# Patient Record
Sex: MALE | Race: BLACK OR AFRICAN AMERICAN | Employment: PART TIME
[De-identification: names, ages, dates, MRNs, and addresses within clinical notes are randomized per-mention and may not be internally consistent; named-entity substitution may affect disease eponyms.]

---

## 2017-01-18 ENCOUNTER — OFFICE VISIT (OUTPATIENT)
Dept: PODIATRY | Facility: CLINIC | Age: 57
End: 2017-01-18

## 2017-01-18 VITALS
SYSTOLIC BLOOD PRESSURE: 150 MMHG | WEIGHT: 198 LBS | HEART RATE: 82 BPM | BODY MASS INDEX: 30.01 KG/M2 | TEMPERATURE: 98.1 F | HEIGHT: 68 IN | DIASTOLIC BLOOD PRESSURE: 88 MMHG

## 2017-01-18 DIAGNOSIS — M79.674 PAIN IN TOES OF BOTH FEET: ICD-10-CM

## 2017-01-18 DIAGNOSIS — E11.9 TYPE 2 DIABETES MELLITUS WITHOUT COMPLICATION, WITHOUT LONG-TERM CURRENT USE OF INSULIN (HCC): ICD-10-CM

## 2017-01-18 DIAGNOSIS — M79.675 PAIN IN TOES OF BOTH FEET: ICD-10-CM

## 2017-01-18 DIAGNOSIS — B35.1 ONYCHOMYCOSIS: Primary | ICD-10-CM

## 2017-01-18 PROCEDURE — 11721 DEBRIDE NAIL 6 OR MORE: CPT | Performed by: PODIATRIST

## 2017-01-18 PROCEDURE — 99213 OFFICE O/P EST LOW 20 MIN: CPT | Performed by: PODIATRIST

## 2017-03-28 DIAGNOSIS — I10 ESSENTIAL HYPERTENSION: ICD-10-CM

## 2017-03-28 RX ORDER — METOPROLOL TARTRATE 100 MG/1
100 TABLET ORAL 2 TIMES DAILY
Qty: 60 TABLET | Refills: 3 | Status: SHIPPED | OUTPATIENT
Start: 2017-03-28 | End: 2018-02-23 | Stop reason: SDUPTHER

## 2017-03-28 RX ORDER — GLIPIZIDE 10 MG/1
TABLET, FILM COATED, EXTENDED RELEASE ORAL
Qty: 60 TABLET | Refills: 4 | Status: SHIPPED | OUTPATIENT
Start: 2017-03-28 | End: 2017-08-24 | Stop reason: SDUPTHER

## 2017-03-28 RX ORDER — SIMVASTATIN 20 MG
20 TABLET ORAL NIGHTLY
Qty: 30 TABLET | Refills: 5 | Status: SHIPPED | OUTPATIENT
Start: 2017-03-28 | End: 2017-09-21 | Stop reason: SDUPTHER

## 2017-03-28 RX ORDER — ASPIRIN 81 MG/1
81 TABLET ORAL DAILY
Qty: 30 TABLET | Refills: 3 | Status: SHIPPED | OUTPATIENT
Start: 2017-03-28 | End: 2021-08-26 | Stop reason: SDUPTHER

## 2017-03-28 RX ORDER — LISINOPRIL 40 MG/1
TABLET ORAL
Qty: 30 TABLET | Refills: 3 | Status: SHIPPED | OUTPATIENT
Start: 2017-03-28 | End: 2017-07-24 | Stop reason: SDUPTHER

## 2017-04-11 ENCOUNTER — TELEPHONE (OUTPATIENT)
Dept: PODIATRY | Age: 57
End: 2017-04-11

## 2017-04-12 RX ORDER — AMLODIPINE BESYLATE 10 MG/1
TABLET ORAL
Qty: 30 TABLET | Refills: 5 | Status: SHIPPED | OUTPATIENT
Start: 2017-04-12 | End: 2017-05-16 | Stop reason: SDUPTHER

## 2017-05-16 ENCOUNTER — OFFICE VISIT (OUTPATIENT)
Dept: FAMILY MEDICINE CLINIC | Age: 57
End: 2017-05-16
Payer: COMMERCIAL

## 2017-05-16 VITALS
TEMPERATURE: 98.3 F | DIASTOLIC BLOOD PRESSURE: 90 MMHG | HEIGHT: 68 IN | BODY MASS INDEX: 30.25 KG/M2 | WEIGHT: 199.6 LBS | SYSTOLIC BLOOD PRESSURE: 140 MMHG | HEART RATE: 69 BPM

## 2017-05-16 DIAGNOSIS — I10 ESSENTIAL HYPERTENSION: ICD-10-CM

## 2017-05-16 DIAGNOSIS — M54.41 CHRONIC RIGHT-SIDED LOW BACK PAIN WITH RIGHT-SIDED SCIATICA: Primary | ICD-10-CM

## 2017-05-16 DIAGNOSIS — G89.29 CHRONIC RIGHT-SIDED LOW BACK PAIN WITH RIGHT-SIDED SCIATICA: Primary | ICD-10-CM

## 2017-05-16 PROCEDURE — 99213 OFFICE O/P EST LOW 20 MIN: CPT | Performed by: FAMILY MEDICINE

## 2017-05-16 RX ORDER — AMLODIPINE BESYLATE 10 MG/1
TABLET ORAL
Qty: 30 TABLET | Refills: 5 | Status: SHIPPED | OUTPATIENT
Start: 2017-05-16 | End: 2018-04-23 | Stop reason: SDUPTHER

## 2017-05-16 RX ORDER — ETODOLAC 400 MG/1
400 TABLET, FILM COATED ORAL 2 TIMES DAILY
Qty: 60 TABLET | Refills: 3 | Status: SHIPPED | OUTPATIENT
Start: 2017-05-16 | End: 2017-06-16 | Stop reason: ALTCHOICE

## 2017-05-16 ASSESSMENT — ENCOUNTER SYMPTOMS
WHEEZING: 0
BACK PAIN: 1
SHORTNESS OF BREATH: 0
GASTROINTESTINAL NEGATIVE: 1

## 2017-05-24 ENCOUNTER — OFFICE VISIT (OUTPATIENT)
Dept: PODIATRY | Age: 57
End: 2017-05-24
Payer: COMMERCIAL

## 2017-05-24 VITALS
WEIGHT: 200 LBS | TEMPERATURE: 97.7 F | BODY MASS INDEX: 30.31 KG/M2 | DIASTOLIC BLOOD PRESSURE: 76 MMHG | HEART RATE: 72 BPM | HEIGHT: 68 IN | SYSTOLIC BLOOD PRESSURE: 132 MMHG

## 2017-05-24 DIAGNOSIS — M20.42 HAMMER TOES OF BOTH FEET: ICD-10-CM

## 2017-05-24 DIAGNOSIS — B35.1 ONYCHOMYCOSIS: Primary | ICD-10-CM

## 2017-05-24 DIAGNOSIS — M20.41 HAMMER TOES OF BOTH FEET: ICD-10-CM

## 2017-05-24 DIAGNOSIS — M79.674 PAIN IN TOES OF BOTH FEET: ICD-10-CM

## 2017-05-24 DIAGNOSIS — E11.9 TYPE 2 DIABETES MELLITUS WITHOUT COMPLICATION, WITHOUT LONG-TERM CURRENT USE OF INSULIN (HCC): ICD-10-CM

## 2017-05-24 DIAGNOSIS — M20.5X9 HALLUX LIMITUS, UNSPECIFIED LATERALITY: ICD-10-CM

## 2017-05-24 DIAGNOSIS — B35.3 TINEA PEDIS OF BOTH FEET: ICD-10-CM

## 2017-05-24 DIAGNOSIS — M79.675 PAIN IN TOES OF BOTH FEET: ICD-10-CM

## 2017-05-24 PROCEDURE — 11721 DEBRIDE NAIL 6 OR MORE: CPT | Performed by: PODIATRIST

## 2017-05-24 PROCEDURE — 99213 OFFICE O/P EST LOW 20 MIN: CPT | Performed by: PODIATRIST

## 2017-05-24 RX ORDER — KETOCONAZOLE 20 MG/G
CREAM TOPICAL
Qty: 30 G | Refills: 1 | Status: SHIPPED | OUTPATIENT
Start: 2017-05-24

## 2017-06-02 ENCOUNTER — HOSPITAL ENCOUNTER (OUTPATIENT)
Dept: PHYSICAL THERAPY | Age: 57
Setting detail: THERAPIES SERIES
Discharge: HOME | End: 2017-06-02
Payer: COMMERCIAL

## 2017-06-16 ENCOUNTER — OFFICE VISIT (OUTPATIENT)
Dept: FAMILY MEDICINE CLINIC | Age: 57
End: 2017-06-16
Payer: COMMERCIAL

## 2017-06-16 VITALS
TEMPERATURE: 97.6 F | HEIGHT: 68 IN | WEIGHT: 197 LBS | HEART RATE: 68 BPM | SYSTOLIC BLOOD PRESSURE: 138 MMHG | DIASTOLIC BLOOD PRESSURE: 89 MMHG | BODY MASS INDEX: 29.86 KG/M2

## 2017-06-16 DIAGNOSIS — M54.41 CHRONIC RIGHT-SIDED LOW BACK PAIN WITH RIGHT-SIDED SCIATICA: ICD-10-CM

## 2017-06-16 DIAGNOSIS — E78.5 HYPERLIPIDEMIA, UNSPECIFIED HYPERLIPIDEMIA TYPE: ICD-10-CM

## 2017-06-16 DIAGNOSIS — E11.9 TYPE 2 DIABETES MELLITUS WITHOUT COMPLICATION, WITHOUT LONG-TERM CURRENT USE OF INSULIN (HCC): Primary | ICD-10-CM

## 2017-06-16 DIAGNOSIS — I10 ESSENTIAL HYPERTENSION: ICD-10-CM

## 2017-06-16 DIAGNOSIS — G89.29 CHRONIC RIGHT-SIDED LOW BACK PAIN WITH RIGHT-SIDED SCIATICA: ICD-10-CM

## 2017-06-16 DIAGNOSIS — Z23 NEED FOR VACCINATION: ICD-10-CM

## 2017-06-16 DIAGNOSIS — R60.9 PERIPHERAL EDEMA: ICD-10-CM

## 2017-06-16 PROCEDURE — 99213 OFFICE O/P EST LOW 20 MIN: CPT | Performed by: FAMILY MEDICINE

## 2017-06-16 PROCEDURE — 90471 IMMUNIZATION ADMIN: CPT | Performed by: FAMILY MEDICINE

## 2017-06-16 PROCEDURE — 90732 PPSV23 VACC 2 YRS+ SUBQ/IM: CPT | Performed by: FAMILY MEDICINE

## 2017-06-16 PROCEDURE — 90715 TDAP VACCINE 7 YRS/> IM: CPT | Performed by: FAMILY MEDICINE

## 2017-06-16 PROCEDURE — 90472 IMMUNIZATION ADMIN EACH ADD: CPT | Performed by: FAMILY MEDICINE

## 2017-06-16 RX ORDER — NAPROXEN 500 MG/1
500 TABLET ORAL 2 TIMES DAILY PRN
Qty: 60 TABLET | Refills: 0 | Status: SHIPPED | OUTPATIENT
Start: 2017-06-16 | End: 2017-12-04 | Stop reason: ALTCHOICE

## 2017-06-16 ASSESSMENT — ENCOUNTER SYMPTOMS
RESPIRATORY NEGATIVE: 1
GASTROINTESTINAL NEGATIVE: 1
BACK PAIN: 1

## 2017-06-16 ASSESSMENT — PATIENT HEALTH QUESTIONNAIRE - PHQ9
1. LITTLE INTEREST OR PLEASURE IN DOING THINGS: 0
SUM OF ALL RESPONSES TO PHQ QUESTIONS 1-9: 0
2. FEELING DOWN, DEPRESSED OR HOPELESS: 0
SUM OF ALL RESPONSES TO PHQ9 QUESTIONS 1 & 2: 0

## 2017-07-25 RX ORDER — LISINOPRIL 40 MG/1
TABLET ORAL
Qty: 30 TABLET | Refills: 3 | Status: SHIPPED | OUTPATIENT
Start: 2017-07-25 | End: 2017-12-08 | Stop reason: SDUPTHER

## 2017-08-24 RX ORDER — GLIPIZIDE 10 MG/1
TABLET, FILM COATED, EXTENDED RELEASE ORAL
Qty: 60 TABLET | Refills: 4 | Status: SHIPPED | OUTPATIENT
Start: 2017-08-24 | End: 2018-01-26 | Stop reason: SDUPTHER

## 2017-09-18 ENCOUNTER — OFFICE VISIT (OUTPATIENT)
Dept: FAMILY MEDICINE CLINIC | Age: 57
End: 2017-09-18
Payer: COMMERCIAL

## 2017-09-18 VITALS
DIASTOLIC BLOOD PRESSURE: 85 MMHG | WEIGHT: 199.8 LBS | BODY MASS INDEX: 30.28 KG/M2 | HEART RATE: 65 BPM | SYSTOLIC BLOOD PRESSURE: 140 MMHG | HEIGHT: 68 IN | TEMPERATURE: 97.2 F

## 2017-09-18 DIAGNOSIS — M25.522 LEFT ELBOW PAIN: ICD-10-CM

## 2017-09-18 DIAGNOSIS — E11.9 TYPE 2 DIABETES MELLITUS WITHOUT COMPLICATION, WITHOUT LONG-TERM CURRENT USE OF INSULIN (HCC): Primary | ICD-10-CM

## 2017-09-18 LAB
CREATININE URINE POCT: 300
HBA1C MFR BLD: 9.4 %
MICROALBUMIN/CREAT 24H UR: 150 MG/G{CREAT}
MICROALBUMIN/CREAT UR-RTO: NORMAL

## 2017-09-18 PROCEDURE — 83036 HEMOGLOBIN GLYCOSYLATED A1C: CPT | Performed by: FAMILY MEDICINE

## 2017-09-18 PROCEDURE — 99213 OFFICE O/P EST LOW 20 MIN: CPT | Performed by: FAMILY MEDICINE

## 2017-09-18 PROCEDURE — 82044 UR ALBUMIN SEMIQUANTITATIVE: CPT | Performed by: FAMILY MEDICINE

## 2017-09-18 ASSESSMENT — ENCOUNTER SYMPTOMS
SHORTNESS OF BREATH: 0
NAUSEA: 0
VOMITING: 0
DIARRHEA: 0

## 2017-09-21 RX ORDER — SIMVASTATIN 20 MG
TABLET ORAL
Qty: 30 TABLET | Refills: 5 | Status: SHIPPED | OUTPATIENT
Start: 2017-09-21 | End: 2018-03-25 | Stop reason: SDUPTHER

## 2017-09-28 LAB
AVERAGE GLUCOSE: NORMAL
HBA1C MFR BLD: 8 %

## 2017-10-02 ENCOUNTER — TELEPHONE (OUTPATIENT)
Dept: FAMILY MEDICINE CLINIC | Age: 57
End: 2017-10-02

## 2017-10-02 DIAGNOSIS — E11.65 TYPE 2 DIABETES MELLITUS WITH HYPERGLYCEMIA, WITHOUT LONG-TERM CURRENT USE OF INSULIN (HCC): Primary | ICD-10-CM

## 2017-10-02 RX ORDER — LANCETS 26 GAUGE
1 EACH MISCELLANEOUS PRN
Qty: 100 EACH | Refills: 1 | Status: SHIPPED | OUTPATIENT
Start: 2017-10-02

## 2017-10-02 RX ORDER — PEN NEEDLE, DIABETIC 31 GX5/16"
1 NEEDLE, DISPOSABLE MISCELLANEOUS PRN
Qty: 100 EACH | Refills: 1 | Status: SHIPPED | OUTPATIENT
Start: 2017-10-02

## 2017-10-03 ENCOUNTER — TELEPHONE (OUTPATIENT)
Dept: FAMILY MEDICINE CLINIC | Age: 57
End: 2017-10-03

## 2017-10-09 ENCOUNTER — OFFICE VISIT (OUTPATIENT)
Dept: FAMILY MEDICINE CLINIC | Age: 57
End: 2017-10-09
Payer: COMMERCIAL

## 2017-10-09 VITALS
HEART RATE: 70 BPM | SYSTOLIC BLOOD PRESSURE: 130 MMHG | BODY MASS INDEX: 29.19 KG/M2 | TEMPERATURE: 97.5 F | WEIGHT: 192.6 LBS | HEIGHT: 68 IN | DIASTOLIC BLOOD PRESSURE: 80 MMHG

## 2017-10-09 DIAGNOSIS — G45.8 OTHER SPECIFIED TRANSIENT CEREBRAL ISCHEMIAS: ICD-10-CM

## 2017-10-09 DIAGNOSIS — I10 ESSENTIAL HYPERTENSION: Primary | ICD-10-CM

## 2017-10-09 PROCEDURE — 99213 OFFICE O/P EST LOW 20 MIN: CPT | Performed by: FAMILY MEDICINE

## 2017-10-09 RX ORDER — CYCLOBENZAPRINE HCL 10 MG
10 TABLET ORAL
COMMUNITY
Start: 2017-09-25 | End: 2017-10-25

## 2017-10-09 RX ORDER — INSULIN LISPRO 100 [IU]/ML
INJECTION, SOLUTION INTRAVENOUS; SUBCUTANEOUS
Refills: 1 | COMMUNITY
Start: 2017-09-30 | End: 2018-02-19

## 2017-10-09 RX ORDER — INSULIN GLARGINE 100 [IU]/ML
16 INJECTION, SOLUTION SUBCUTANEOUS
COMMUNITY
Start: 2017-09-30 | End: 2017-11-09 | Stop reason: ALTCHOICE

## 2017-10-09 RX ORDER — CLOPIDOGREL BISULFATE 75 MG/1
1 TABLET ORAL DAILY
Refills: 0 | COMMUNITY
Start: 2017-09-30 | End: 2017-10-30 | Stop reason: SDUPTHER

## 2017-10-09 RX ORDER — IBUPROFEN 600 MG/1
1 TABLET ORAL EVERY 6 HOURS PRN
Refills: 0 | COMMUNITY
Start: 2017-09-25 | End: 2017-12-04

## 2017-10-09 RX ORDER — METOPROLOL TARTRATE 100 MG/1
1 TABLET ORAL DAILY
Refills: 0 | COMMUNITY
Start: 2017-09-23 | End: 2018-02-19

## 2017-10-09 ASSESSMENT — ENCOUNTER SYMPTOMS
SORE THROAT: 0
ABDOMINAL PAIN: 0
NAUSEA: 0
CHEST TIGHTNESS: 0
VOMITING: 0
SHORTNESS OF BREATH: 0
BLOOD IN STOOL: 0
COUGH: 0

## 2017-10-09 NOTE — PROGRESS NOTES
Visit Information    Have you changed or started any medications since your last visit including any over-the-counter medicines, vitamins, or herbal medicines? no   Have you stopped taking any of your medications? Is so, why? -  no  Are you having any side effects from any of your medications? - no    Have you seen any other physician or provider since your last visit?  no   Have you had any other diagnostic tests since your last visit? yes - Labs   Have you been seen in the emergency room and/or had an admission in a hospital since we last saw you?  yes - [place]   Have you had your routine dental cleaning in the past 6 months?  no     Do you have an active MyChart account? If no, what is the barrier?   No: Declined    Patient Care Team:  Sruthi Pope MD as PCP - General (Family Medicine)    Medical History Review  Past Medical, Family, and Social History reviewed and does not contribute to the patient presenting condition    Health Maintenance   Topic Date Due    Diabetic retinal exam  03/30/2016    Lipid screen  02/11/2017    Flu vaccine (1) 10/11/2017 (Originally 9/1/2017)    Diabetic hemoglobin A1C test  12/18/2017    Diabetic foot exam  09/18/2018    Diabetic microalbuminuria test  09/18/2018    Colon cancer screen colonoscopy  08/25/2024    DTaP/Tdap/Td vaccine (2 - Td) 06/16/2027    Pneumococcal med risk  Completed    Hepatitis C screen  Addressed    HIV screen  Addressed
He is alert and oriented to person, place, and time. He has normal reflexes. Skin: Skin is warm and dry. Psychiatric: He has a normal mood and affect. His behavior is normal. Judgment and thought content normal.       Assessment:      1. Essential hypertension     2. Other specified transient cerebral ischemias             Plan: Will pursue chart review - prior medical records review.     RTW - TODAY    F/U IN 2-3 WEEKS      Goals - monitor risks, weight reduction, BP control, DM care

## 2017-10-11 ENCOUNTER — TELEPHONE (OUTPATIENT)
Dept: FAMILY MEDICINE CLINIC | Age: 57
End: 2017-10-11

## 2017-10-11 NOTE — TELEPHONE ENCOUNTER
Patient is calling regarding work letter given at last appointment. Patient is asking if he can be given a new letter stating he was unable to work for the time between his hospitalization at [facility] and his appointment on 10/9/17. If I can be of any assistance please route to the applicable pool.    Thanks, Carol Pride

## 2017-10-31 RX ORDER — CLOPIDOGREL BISULFATE 75 MG/1
TABLET ORAL
Qty: 30 TABLET | Refills: 0 | Status: SHIPPED | OUTPATIENT
Start: 2017-10-31 | End: 2017-12-04 | Stop reason: SDUPTHER

## 2017-11-09 ENCOUNTER — OFFICE VISIT (OUTPATIENT)
Dept: FAMILY MEDICINE CLINIC | Age: 57
End: 2017-11-09
Payer: COMMERCIAL

## 2017-11-09 VITALS
SYSTOLIC BLOOD PRESSURE: 138 MMHG | HEIGHT: 68 IN | TEMPERATURE: 98.8 F | WEIGHT: 199.4 LBS | BODY MASS INDEX: 30.22 KG/M2 | HEART RATE: 67 BPM | DIASTOLIC BLOOD PRESSURE: 80 MMHG

## 2017-11-09 DIAGNOSIS — E11.65 TYPE 2 DIABETES MELLITUS WITH HYPERGLYCEMIA, WITHOUT LONG-TERM CURRENT USE OF INSULIN (HCC): Primary | ICD-10-CM

## 2017-11-09 PROCEDURE — G8427 DOCREV CUR MEDS BY ELIG CLIN: HCPCS | Performed by: FAMILY MEDICINE

## 2017-11-09 PROCEDURE — 1036F TOBACCO NON-USER: CPT | Performed by: FAMILY MEDICINE

## 2017-11-09 PROCEDURE — 3046F HEMOGLOBIN A1C LEVEL >9.0%: CPT | Performed by: FAMILY MEDICINE

## 2017-11-09 PROCEDURE — 99213 OFFICE O/P EST LOW 20 MIN: CPT | Performed by: FAMILY MEDICINE

## 2017-11-09 PROCEDURE — G8417 CALC BMI ABV UP PARAM F/U: HCPCS | Performed by: FAMILY MEDICINE

## 2017-11-09 PROCEDURE — G8484 FLU IMMUNIZE NO ADMIN: HCPCS | Performed by: FAMILY MEDICINE

## 2017-11-09 PROCEDURE — 3017F COLORECTAL CA SCREEN DOC REV: CPT | Performed by: FAMILY MEDICINE

## 2017-11-09 ASSESSMENT — ENCOUNTER SYMPTOMS
NAUSEA: 0
SHORTNESS OF BREATH: 0
ABDOMINAL PAIN: 0
BLOOD IN STOOL: 0
DIARRHEA: 0
VOMITING: 0

## 2017-11-09 NOTE — PATIENT INSTRUCTIONS
Thank you for letting us take care of you today. We hope all your questions were addressed. If a question was overlooked or something else comes to mind after you return home, please contact a member of your Care Team listed below. Please make sure you have a routine office visit set up to follow-up on [address]. Your Care Team at [place] [address] is Team #4  Pinky Cherry MD (Faculty)  MD Jayda Griffin MD (Resident)  Carletta Gottron, MD (Resident)  Nalini George MD (Resident)  Analy aMrtinez MD (Resident)  SHIVA Palacio., RMA  Ninfa Alvarez., RMA  Skyla Renteria ([address])  Lacey Dave RN, ([address] [facility])  Gutierrez Catalan, Ph.D., (Behavioral Services)  Saurabh Pederson, [facility] (Clinical Pharmacist)       Office phone number: [phone]    If you need to get in right away due to illness, please be advised we have \"Same Day\" appointments available Monday-Friday. Please call us at [phone] option #1 to schedule your \"Same Day\" appointment.

## 2017-11-09 NOTE — PROGRESS NOTES
Subjective:      Patient ID: Jesus Other is a 56 y.o. male. HPI  Last HbA1c:   9.4 in September  Insulin regimen: On metformin and glipizide currently, no lantus as of now  Checking home blood sugars:  Yes did not bring today, reports  sugars, rarely > 120  Denies hypoglycemic sx's  BP controlled:  138/80  ACE-I:  On lisinopril 40mg  Statin:  zocor 20 mg  Vaccinations:  Refused flu otherwise uptodate  Smoking status:  Quit smoking  Microalbuminuria:  Abnormal last check on lisinopril  Foot exam:  Seen podiatry last week  Eye exam:  Will make appt    Patient had TIA 9/28 was worked up at [facility] [place]. See care everywhere     Review of Systems   Constitutional: Negative for chills and fever. HENT: Negative for congestion. Eyes: Negative for visual disturbance. Respiratory: Negative for shortness of breath. Cardiovascular: Negative for chest pain and leg swelling. Gastrointestinal: Negative for abdominal pain, blood in stool, diarrhea, nausea and vomiting. Genitourinary: Negative for dysuria and hematuria. Neurological: Negative for dizziness, weakness, light-headedness and headaches. Objective:   Physical Exam   Constitutional: He appears well-developed and well-nourished. No distress. Cardiovascular: Normal rate, regular rhythm and normal heart sounds. No murmur heard. Pulmonary/Chest: Effort normal and breath sounds normal. He has no wheezes. Musculoskeletal: Normal range of motion. He exhibits no edema. Assessment/Plan:      1. Type 2 diabetes mellitus with hyperglycemia, without long-term current use of insulin (HCC)  - too early for A1c, advised patient to return in 6-7 weeks for proper repeat of A1c, patient in agreement  - educated diet exercise lifestyle modification      Return in about 7 weeks (around 12/28/2017) for fu Dr. David Goodrich DM check.     [place]on received counseling on the following healthy behaviors: nutrition, exercise and medication adherence  Reviewed prior labs and health maintenance  Continue current medications, diet and exercise. Discussed use, benefit, and side effects of prescribed medications. Barriers to medication compliance addressed. Patient given educational materials - see patient instructions  Was a self-tracking handout given in paper form or via [employer]? No:     Requested Prescriptions      No prescriptions requested or ordered in this encounter       All patient questions answered. Patient voiced understanding. Quality Measures    Body mass index is 30.22 kg/m². Elevated. Weight control planned discussed Healthy diet and regular exercise. BP: 138/80 Blood pressure is normal. Treatment plan consists of No treatment change needed.     Lab Results   Component Value Date    LDLCALC 120 07/24/2013    LDLCHOLESTEROL 89 02/11/2016    (goal LDL reduction with dx if diabetes is 50% LDL reduction)      PHQ Scores 6/16/2017   PHQ2 Score 0   PHQ9 Score 0     Interpretation of Total Score Depression Severity: 1-4 = Minimal depression, 5-9 = Mild depression, 10-14 = Moderate depression, 15-19 = Moderately severe depression, 20-27 = Severe depression    Requested Prescriptions      No prescriptions requested or ordered in this encounter       Medications Discontinued During This Encounter   Medication Reason    insulin glargine (LANTUS) 100 UNIT/ML injection vial Therapy completed

## 2017-12-04 ENCOUNTER — APPOINTMENT (OUTPATIENT)
Dept: GENERAL RADIOLOGY | Age: 57
End: 2017-12-04
Payer: COMMERCIAL

## 2017-12-04 ENCOUNTER — HOSPITAL ENCOUNTER (EMERGENCY)
Age: 57
Discharge: HOME | End: 2017-12-04
Attending: EMERGENCY MEDICINE
Payer: COMMERCIAL

## 2017-12-04 VITALS
HEART RATE: 87 BPM | RESPIRATION RATE: 18 BRPM | OXYGEN SATURATION: 98 % | SYSTOLIC BLOOD PRESSURE: 167 MMHG | TEMPERATURE: 97.7 F | DIASTOLIC BLOOD PRESSURE: 63 MMHG

## 2017-12-04 DIAGNOSIS — D48.0 SYNOVIAL CHONDROMATOSIS: ICD-10-CM

## 2017-12-04 DIAGNOSIS — M19.022 ARTHRITIS OF LEFT ELBOW: ICD-10-CM

## 2017-12-04 DIAGNOSIS — M24.022 LOOSE BODY IN ELBOW JOINT, LEFT: Primary | ICD-10-CM

## 2017-12-04 PROCEDURE — 99283 EMERGENCY DEPT VISIT LOW MDM: CPT

## 2017-12-04 PROCEDURE — 96372 THER/PROPH/DIAG INJ SC/IM: CPT

## 2017-12-04 PROCEDURE — 6360000002 HC RX W HCPCS: Performed by: STUDENT IN AN ORGANIZED HEALTH CARE EDUCATION/TRAINING PROGRAM

## 2017-12-04 PROCEDURE — 73080 X-RAY EXAM OF ELBOW: CPT

## 2017-12-04 RX ORDER — IBUPROFEN 600 MG/1
600 TABLET ORAL EVERY 6 HOURS PRN
Qty: 28 TABLET | Refills: 0 | Status: SHIPPED | OUTPATIENT
Start: 2017-12-04 | End: 2018-04-23 | Stop reason: SDUPTHER

## 2017-12-04 RX ORDER — KETOROLAC TROMETHAMINE 30 MG/ML
30 INJECTION, SOLUTION INTRAMUSCULAR; INTRAVENOUS ONCE
Status: COMPLETED | OUTPATIENT
Start: 2017-12-04 | End: 2017-12-04

## 2017-12-04 RX ADMIN — KETOROLAC TROMETHAMINE 30 MG: 30 INJECTION, SOLUTION INTRAMUSCULAR at 09:38

## 2017-12-04 ASSESSMENT — ENCOUNTER SYMPTOMS
NAUSEA: 0
ALLERGIC/IMMUNOLOGIC NEGATIVE: 1
VOMITING: 0
EYES NEGATIVE: 1
CHEST TIGHTNESS: 0
COLOR CHANGE: 0
GASTROINTESTINAL NEGATIVE: 1
COUGH: 0
SHORTNESS OF BREATH: 0
BACK PAIN: 1

## 2017-12-04 ASSESSMENT — PAIN SCALES - GENERAL
PAINLEVEL_OUTOF10: 6
PAINLEVEL_OUTOF10: 9

## 2017-12-04 NOTE — ED PROVIDER NOTES
[address] [facility]     Emergency Department     Faculty Attestation        I performed a history and physical examination of the patient and discussed management with the resident. I reviewed the residents note and agree with the documented findings and plan of care. Any areas of disagreement are noted on the chart. I was personally present for the key portions of any procedures. I have documented in the chart those procedures where I was not present during the key portions. I have reviewed the emergency nurses triage note. I agree with the chief complaint, past medical history, past surgical history, allergies, medications, social and family history as documented unless otherwise noted below. For Physician Assistant/ Nurse Practitioner cases/documentation I have personally evaluated this patient and have completed at least one if not all key elements of the E/M (history, physical exam, and MDM). Additional findings are as noted. Vital Signs: BP: (!) 167/63  Pulse: 87  Resp: 18  Temp: 97.7 °F (36.5 °C) SpO2: 98 %  PCP:  Sruthi Pope MD    Pertinent Comments:         Critical Care  None      (Please note that portions of this note were completed with a voice recognition program. Efforts were made to edit the dictations but occasionally words are mis-transcribed.  Whenever words are used in this note in any gender, they shall be construed as though they were used in the gender appropriate to the circumstances; and whenever words are used in this note in the singular or plural form, they shall be construed as though they were used in the form appropriate to the circumstances.)    MD Dontrell Kim  Attending Emergency Medicine Physician            Viviane Rojas MD  12/04/17 6038

## 2017-12-04 NOTE — ED PROVIDER NOTES
[address]  ED  eMERGENCY dEPARTMENT eNCOUnter  Resident    Pt Name: [place]  MRN: [id number]  Rossgftess 1960  Date of evaluation: 12/4/2017  PCP:  Nguyen Oliver MD    [address] [place] [address]       Chief Complaint   Patient presents with    Elbow Pain     Pt to ED with c/o left elbow pain for the past week, denies injury, worse with ROM       HISTORY OF [address] Diego Tejeda is a 56 y.o. male who presents with left elbow pain, chronic in nature over years with acute worsening over the past 4 days. He rates the severity 10/10 today and describes it as sharp in nature, making it very painful to move. He denies numbness or tingling in the left arm. He denies recent injuries and relates that his arthritis affects his wrists and back as well; states he takes motrin 800 mg for pain as needed, last taken a week ago. He denies any shortness of breath, palpitations, or chest pain. He relates having had a \"mini stroke\" 2 months ago and was treated at [facility] but denies any neurologic deficits; takes plavix/asa. Denies nausea, vomiting, fever, or chills. Location/Symptom: Left elbow pain  Timing/Onset: Acute on chronic; worsening over 4 days  Context/Setting: Denies recent excessive use  Quality: Sharp  Duration: Worsening over 4 days  Modifying Factors: Somewhat improved with motrin  Severity: 10/10     REVIEW OF SYSTEMS       Review of Systems   Constitutional: Positive for activity change. Negative for chills, fatigue and fever. Unable to use left elbow due to pain   HENT: Negative. Eyes: Negative. Respiratory: Negative for cough, chest tightness and shortness of breath. Cardiovascular: Negative for chest pain, palpitations and leg swelling. Gastrointestinal: Negative. Negative for nausea and vomiting. Endocrine: Negative. Genitourinary: Negative. Musculoskeletal: Positive for arthralgias, back pain, myalgias and neck stiffness.  Negative for gait problem, joint swelling and neck pain. Chronic neck/shoulder stiffness and back pain  Worsening left elbow pain   Skin: Negative for color change, rash and wound. Allergic/Immunologic: Negative. Neurological: Negative. Hematological: Negative. Psychiatric/Behavioral: Negative. PAST MEDICAL HISTORY    has a past medical history of Chronic back pain; Edentulous; Elbow pain, chronic; Hyperlipidemia; Hypertension; Thoracic aortic aneurysm diagnosed  2010? at [facility]; and Type II or unspecified type diabetes mellitus without mention of complication, not stated as uncontrolled. SURGICAL HISTORY      has a past surgical history that includes Thoracic aortic aneurysm repair (march 14th 2012); Dental surgery; and Colonoscopy (8/25/14).       CURRENT MEDICATIONS       Current Discharge Medication List      CONTINUE these medications which have NOT CHANGED    Details   clopidogrel (PLAVIX) 75 MG tablet take 1 tablet by mouth once daily  Qty: 30 tablet, Refills: 0      HUMALOG KWIKPEN 100 UNIT/ML pen inject 1-4 units UNDER THE SKIN three times a day WITH MEALS 1 UNIT FOR 20 GRAM CARB  Refills: 1      metoprolol (LOPRESSOR) 100 MG tablet Take 1 tablet by mouth daily  Refills: 0      LANCETS ULTRA THIN MISC 1 each by Does not apply route as needed (blood glc check)  Qty: 100 each, Refills: 1    Associated Diagnoses: Type 2 diabetes mellitus with hyperglycemia, without long-term current use of insulin ([facility])      Insulin Pen Needle (B-D ULTRAFINE III SHORT PEN) 31G X 8 MM MISC 1 each by Does not apply route daily  Qty: 100 each, Refills: 3    Associated Diagnoses: Type 2 diabetes mellitus with hyperglycemia, without long-term current use of insulin (HCC)      Alcohol Swabs (ALCOHOL PREP) PADS 1 each by Does not apply route as needed (glc check)  Qty: 100 each, Refills: 1    Associated Diagnoses: Type 2 diabetes mellitus with hyperglycemia, without long-term current use of insulin (HCC)      glucose blood temperature is 97.7 °F (36.5 °C). His blood pressure is 167/63 (abnormal) and his pulse is 87. His respiration is 18 and oxygen saturation is 98%. Physical Exam   Constitutional: He is oriented to person, place, and time. He appears well-developed. No distress. HENT:   Head: Normocephalic and atraumatic. Eyes: Conjunctivae and EOM are normal. Pupils are equal, round, and reactive to light. Neck: Normal range of motion. Some pain with palpation of trapezius, left   Cardiovascular: Normal rate, regular rhythm, normal heart sounds and intact distal pulses. Pulses intact to left arm   Pulmonary/Chest: Effort normal and breath sounds normal. No respiratory distress. Abdominal: Soft. He exhibits no distension. There is no tenderness. Musculoskeletal: He exhibits tenderness. He exhibits no edema or deformity.   -Painful limited extension/flexion of left elbow  -POP of olecranon, left  -Sensation intact to left arm and hand  - strength to left hand wnl     Neurological: He is alert and oriented to person, place, and time. Skin: Skin is warm and dry. No rash noted. No erythema. Psychiatric: He has a normal mood and affect. His behavior is normal.       DIFFERENTIAL DIAGNOSIS/MDM:   Chondromalacia vs Olecranon bursitis vs Hemarthrosis of LEFT elbow    DIAGNOSTIC RESULTS     EKG: All EKG's are interpreted by the Emergency Department Physician who either signs or Co-signs this chart in the absence of a cardiologist.    N/A    RADIOLOGY:   I directly visualized the following  images and reviewed the radiologist interpretations:  XR ELBOW LEFT (MIN 3 VIEWS)   Final Result   Essentially stable findings of synovial osteochondromatosis. No evidence for   acute fracture or dislocation.              ED BEDSIDE ULTRASOUND:   N/A    LABS:  Labs Reviewed - No data to display    N/A      EMERGENCY DEPARTMENT COURSE:   Vitals:    Vitals:    12/04/17 0843   BP: (!) 167/63   Pulse: 87   Resp: 18   Temp: 97.7 °F (36.5 °C)   TempSrc: Oral   SpO2: 98%       CRITICAL CARE:  n/a    CONSULTS:  None      PROCEDURES:  Procedures      FINAL IMPRESSION      1. Loose body in elbow joint, left    2. Synovial chondromatosis    3. Arthritis of left elbow        Patient presents with acute on chronic left elbow pain and denies trauma. XR from 2015 showed synovial osteochondromalacia. Patient has not seen Ortho regarding this problem and has been using motrin 800 mg with minimal relief. Patient given toradol 30 mg IM in ED for immediate pain relief and xray taken which showed multiple small calcified loose bodies within the elbow joint. Patient to follow-up with P Ortho as soon as possible and advised to take motrin 800 mg as schedules for a short duration to improve pain, 1 week supply prescribed.     DISPOSITION/PLAN   DISPOSITION     Discharge Home    PATIENT REFERRED TO:  [address] [place] [address] 2, [facility]  [phone]  Schedule an appointment as soon as possible for a visit today  Osteochrondromatoisis left elbow      DISCHARGE MEDICATIONS:  Current Discharge Medication List          (Please note that portions of this note were completed with a voice recognition program.  Efforts were made to edit the dictations but occasionally words are mis-transcribed.)    Mirella Bullock 284, PGY1             [facility]  12/04/17 9530

## 2017-12-05 RX ORDER — CLOPIDOGREL BISULFATE 75 MG/1
TABLET ORAL
Qty: 30 TABLET | Refills: 0 | Status: SHIPPED | OUTPATIENT
Start: 2017-12-05 | End: 2018-01-10 | Stop reason: SDUPTHER

## 2017-12-11 RX ORDER — LISINOPRIL 40 MG/1
TABLET ORAL
Qty: 30 TABLET | Refills: 3 | Status: SHIPPED | OUTPATIENT
Start: 2017-12-11 | End: 2018-04-03 | Stop reason: SDUPTHER

## 2017-12-18 ENCOUNTER — OFFICE VISIT (OUTPATIENT)
Dept: ORTHOPEDIC SURGERY | Age: 57
End: 2017-12-18
Payer: COMMERCIAL

## 2017-12-18 VITALS — HEIGHT: 68 IN | BODY MASS INDEX: 30.2 KG/M2 | WEIGHT: 199.3 LBS

## 2017-12-18 DIAGNOSIS — M19.022 PRIMARY OSTEOARTHRITIS OF LEFT ELBOW: ICD-10-CM

## 2017-12-18 DIAGNOSIS — D48.0 SYNOVIAL CHONDROMATOSES: Primary | ICD-10-CM

## 2017-12-18 PROCEDURE — 3017F COLORECTAL CA SCREEN DOC REV: CPT | Performed by: STUDENT IN AN ORGANIZED HEALTH CARE EDUCATION/TRAINING PROGRAM

## 2017-12-18 PROCEDURE — 99204 OFFICE O/P NEW MOD 45 MIN: CPT | Performed by: STUDENT IN AN ORGANIZED HEALTH CARE EDUCATION/TRAINING PROGRAM

## 2017-12-18 PROCEDURE — G8427 DOCREV CUR MEDS BY ELIG CLIN: HCPCS | Performed by: STUDENT IN AN ORGANIZED HEALTH CARE EDUCATION/TRAINING PROGRAM

## 2017-12-18 PROCEDURE — 1036F TOBACCO NON-USER: CPT | Performed by: STUDENT IN AN ORGANIZED HEALTH CARE EDUCATION/TRAINING PROGRAM

## 2017-12-18 PROCEDURE — G8484 FLU IMMUNIZE NO ADMIN: HCPCS | Performed by: STUDENT IN AN ORGANIZED HEALTH CARE EDUCATION/TRAINING PROGRAM

## 2017-12-18 PROCEDURE — G8417 CALC BMI ABV UP PARAM F/U: HCPCS | Performed by: STUDENT IN AN ORGANIZED HEALTH CARE EDUCATION/TRAINING PROGRAM

## 2018-01-08 NOTE — PROGRESS NOTES
I performed a history and physical examination of the patient and discussed management with the resident. I reviewed the residents note and agree with the documented findings and plan of care. Any areas of disagreement are noted on the chart. I have personally evaluated this patient and have completed at least one if not all key elements of the E/M (history, physical exam, and MDM). Additional findings are as noted. I agree with the chief complaint, past medical history, past surgical history, allergies, medications, social and family history as documented unless otherwise noted below.      Electronically signed by Jered Riddle DO on 1/8/2018 at 10:56 AM
effusion noted to the left elbow. Tender to palpation along the posterior aspect of the elbow. Active range of motion 20° to 110°. Pain on maximal flexion. Crepitation noted on range of motion. Stable to varus and valgus stress. Sensation intact distally from C5 to T1 dermatomes. Neuro: alert. oriented  Eyes: Extra-ocular muscles intact  Mouth: Oral mucosa moist. No perioral lesions  Pulm: Respirations unlabored and regular. Skin: warm, well perfused  Psych:   Patient has good fund of knowledge and displays understanging of exam, diagnosis, and plan. Radiology:   XR of the left elbow taken in the emergency room on 12/4 demonstrate multiple calcific bodies in the elbow joint as well as severe degenerative joint changes to the ulnohumeral joint and radiocapitellar joint. No acute fractures or osseous abnormalities noted. Assessment:      1. Synovial chondromatoses    2. Primary osteoarthritis of left elbow       Plan:      - The patient states that the pain is continuing to worsen and he has tried to pull options without much help, including anti-inflammatory medications. He has lost terminal extension and can only flex his left elbow to 100°. Therefore he has lost functional elbow range of motion from °.  - We had a long discussion with the patient regarding treatment options. At this point electing we can offer him is potentially an elbow arthroscopy for removal of the loose bodies and debridement of the synovium in hopes of regaining his functional range of motion. We discussed multiple times that the arthroscopy may improve his symptoms some but he will always have osteoarthritis pain is vastly would not treat the elbow arthritis. The patient verbalized understanding and wished to move forward with the arthroscopy as he has tried \"everything else. \"  - We will set the patient up for left shoulder arthroscopy with loose body removal and debridement.   The patient will need medical clearance

## 2018-01-11 RX ORDER — CLOPIDOGREL BISULFATE 75 MG/1
TABLET ORAL
Qty: 30 TABLET | Refills: 0 | Status: SHIPPED | OUTPATIENT
Start: 2018-01-11 | End: 2018-02-12 | Stop reason: SDUPTHER

## 2018-01-11 NOTE — TELEPHONE ENCOUNTER
Medication is on med list please review and address    Please address the medication refill and close the encounter. If I can be of assistance, please route to the applicable pool. Thank you. Next Visit Date:  Future Appointments  Date Time Provider Cuca Leigh   1/12/2018 1:00 PM DO Avril Caraballo [facility]   1/31/2018 1:20 PM SCHEDULE, [facility] [address] Maintenance   Topic Date Due    Diabetic retinal exam  03/30/2016    Lipid screen  02/11/2017    Potassium monitoring  02/11/2017    Creatinine monitoring  02/11/2017    A1C test (Diabetic or Prediabetic)  12/18/2017    Flu vaccine (1) 11/09/2018 (Originally 9/1/2017)    Diabetic foot exam  09/18/2018    Diabetic microalbuminuria test  09/18/2018    Colon cancer screen colonoscopy  08/25/2024    DTaP/Tdap/Td vaccine (2 - Td) 06/16/2027    Pneumococcal med risk  Completed    Hepatitis C screen  Addressed    HIV screen  Addressed       Hemoglobin A1C (%)   Date Value   09/18/2017 9.4   11/22/2016 7.1   08/22/2016 7.6             ( goal A1C is < 7)   Microalb/Crt. Ratio (mcg/mg creat)   Date Value   02/11/2016 274     LDL Cholesterol (mg/dL)   Date Value   02/11/2016 89     LDL Calculated (mg/dL)   Date Value   07/24/2013 120       (goal LDL is <100)   AST (U/L)   Date Value   02/11/2016 19     ALT (U/L)   Date Value   02/11/2016 11     BUN (mg/dL)   Date Value   02/11/2016 11     BP Readings from Last 3 Encounters:   12/04/17 (!) 167/63   11/09/17 138/80   10/09/17 130/80          (goal 120/80)    All Future Testing planned in [facility]  Lab Frequency Next Occurrence   Lipid Panel Once 01/12/2018               Patient Active Problem List:     HLD (hyperlipidemia)     HTN (hypertension)     DM (diabetes mellitus) ([address]     Chronic back pain     Thoracic aortic aneurysm diagnosed  2010?  at [facility]

## 2018-01-16 ENCOUNTER — TELEPHONE (OUTPATIENT)
Dept: FAMILY MEDICINE CLINIC | Age: 58
End: 2018-01-16

## 2018-01-23 ENCOUNTER — HOSPITAL ENCOUNTER (EMERGENCY)
Age: 58
Discharge: HOME | End: 2018-01-23
Attending: EMERGENCY MEDICINE
Payer: COMMERCIAL

## 2018-01-23 VITALS
HEIGHT: 68 IN | OXYGEN SATURATION: 96 % | BODY MASS INDEX: 30.31 KG/M2 | TEMPERATURE: 98.2 F | SYSTOLIC BLOOD PRESSURE: 181 MMHG | DIASTOLIC BLOOD PRESSURE: 100 MMHG | RESPIRATION RATE: 16 BRPM | HEART RATE: 80 BPM | WEIGHT: 200 LBS

## 2018-01-23 DIAGNOSIS — M25.522 LEFT ELBOW PAIN: Primary | ICD-10-CM

## 2018-01-23 PROCEDURE — 6360000002 HC RX W HCPCS: Performed by: EMERGENCY MEDICINE

## 2018-01-23 PROCEDURE — 96372 THER/PROPH/DIAG INJ SC/IM: CPT

## 2018-01-23 PROCEDURE — 99283 EMERGENCY DEPT VISIT LOW MDM: CPT

## 2018-01-23 RX ORDER — IBUPROFEN 800 MG/1
800 TABLET ORAL EVERY 8 HOURS PRN
Qty: 30 TABLET | Refills: 0 | Status: SHIPPED | OUTPATIENT
Start: 2018-01-23 | End: 2018-02-19

## 2018-01-23 RX ORDER — KETOROLAC TROMETHAMINE 30 MG/ML
30 INJECTION, SOLUTION INTRAMUSCULAR; INTRAVENOUS ONCE
Status: COMPLETED | OUTPATIENT
Start: 2018-01-23 | End: 2018-01-23

## 2018-01-23 RX ADMIN — KETOROLAC TROMETHAMINE 30 MG: 30 INJECTION, SOLUTION INTRAMUSCULAR at 09:06

## 2018-01-23 ASSESSMENT — ENCOUNTER SYMPTOMS
COUGH: 0
SHORTNESS OF BREATH: 0
WHEEZING: 0
STRIDOR: 0

## 2018-01-23 ASSESSMENT — PAIN SCALES - GENERAL: PAINLEVEL_OUTOF10: 10

## 2018-01-23 ASSESSMENT — PAIN DESCRIPTION - ORIENTATION: ORIENTATION: LEFT

## 2018-01-23 ASSESSMENT — PAIN DESCRIPTION - LOCATION: LOCATION: ELBOW

## 2018-01-23 ASSESSMENT — PAIN DESCRIPTION - FREQUENCY: FREQUENCY: CONTINUOUS

## 2018-01-23 ASSESSMENT — PAIN DESCRIPTION - PAIN TYPE: TYPE: CHRONIC PAIN

## 2018-01-23 NOTE — ED NOTES
Pt arrived to ER with reports of chronic left elbow pain, flare up yesterday. Pt reports no new injury, taking Ibuprofen with no relief. Pt ambulated to room with steady gait, nad, rr even and unlabored.  Pt updated     Samantha Pollock RN  01/23/18 0900

## 2018-01-23 NOTE — LETTER
[facility]  [address] [facility] [address]  Phone: [phone]               January 23, 2018    Patient: Nia Godinez   YOB: 1960   Date of Visit: 1/23/2018       To Whom It May Concern:    Nia Godinez was seen and treated in our emergency department on 1/23/2018. He may return to work 1/23/2018.       Sincerely,       Jacquelin Blanton RN         Signature:__________________________________

## 2018-01-30 RX ORDER — GLIPIZIDE 10 MG/1
TABLET, FILM COATED, EXTENDED RELEASE ORAL
Qty: 60 TABLET | Refills: 4 | Status: SHIPPED | OUTPATIENT
Start: 2018-01-30 | End: 2018-06-18 | Stop reason: SDUPTHER

## 2018-02-14 RX ORDER — CLOPIDOGREL BISULFATE 75 MG/1
TABLET ORAL
Qty: 30 TABLET | Refills: 0 | Status: SHIPPED | OUTPATIENT
Start: 2018-02-14 | End: 2018-02-19

## 2018-02-19 RX ORDER — CLOPIDOGREL BISULFATE 75 MG/1
75 TABLET ORAL DAILY
COMMUNITY
End: 2019-01-17 | Stop reason: SDUPTHER

## 2018-02-20 ENCOUNTER — ANESTHESIA EVENT (OUTPATIENT)
Dept: OPERATING ROOM | Age: 58
End: 2018-02-20
Payer: COMMERCIAL

## 2018-02-21 ENCOUNTER — APPOINTMENT (OUTPATIENT)
Dept: GENERAL RADIOLOGY | Age: 58
End: 2018-02-21
Attending: ORTHOPAEDIC SURGERY
Payer: COMMERCIAL

## 2018-02-21 ENCOUNTER — ANESTHESIA (OUTPATIENT)
Dept: OPERATING ROOM | Age: 58
End: 2018-02-21
Payer: COMMERCIAL

## 2018-02-21 ENCOUNTER — HOSPITAL ENCOUNTER (OUTPATIENT)
Age: 58
Setting detail: OUTPATIENT SURGERY
Discharge: HOME | End: 2018-02-21
Attending: ORTHOPAEDIC SURGERY | Admitting: ORTHOPAEDIC SURGERY
Payer: COMMERCIAL

## 2018-02-21 VITALS
DIASTOLIC BLOOD PRESSURE: 72 MMHG | SYSTOLIC BLOOD PRESSURE: 164 MMHG | BODY MASS INDEX: 29 KG/M2 | TEMPERATURE: 97.2 F | HEART RATE: 83 BPM | WEIGHT: 191.36 LBS | RESPIRATION RATE: 12 BRPM | HEIGHT: 68 IN | OXYGEN SATURATION: 95 %

## 2018-02-21 VITALS — SYSTOLIC BLOOD PRESSURE: 172 MMHG | DIASTOLIC BLOOD PRESSURE: 83 MMHG | OXYGEN SATURATION: 99 % | TEMPERATURE: 97.9 F

## 2018-02-21 DIAGNOSIS — G89.18 POST-OP PAIN: Primary | ICD-10-CM

## 2018-02-21 LAB
EKG ATRIAL RATE: 83 BPM
EKG P AXIS: 47 DEGREES
EKG P-R INTERVAL: 148 MS
EKG Q-T INTERVAL: 396 MS
EKG QRS DURATION: 84 MS
EKG QTC CALCULATION (BAZETT): 465 MS
EKG R AXIS: -12 DEGREES
EKG T AXIS: 82 DEGREES
EKG VENTRICULAR RATE: 83 BPM
GFR NON-AFRICAN AMERICAN: >60 ML/MIN
GFR SERPL CREATININE-BSD FRML MDRD: >60 ML/MIN
GFR SERPL CREATININE-BSD FRML MDRD: NORMAL ML/MIN/{1.73_M2}
GLUCOSE BLD-MCNC: 173 MG/DL (ref 75–110)
GLUCOSE BLD-MCNC: 218 MG/DL (ref 75–110)
GLUCOSE BLD-MCNC: 251 MG/DL (ref 75–110)
GLUCOSE BLD-MCNC: 303 MG/DL (ref 75–110)
GLUCOSE BLD-MCNC: 339 MG/DL (ref 74–100)
POC CREATININE: 0.66 MG/DL (ref 0.51–1.19)
POC POTASSIUM: 3.5 MMOL/L (ref 3.5–4.5)

## 2018-02-21 PROCEDURE — 93005 ELECTROCARDIOGRAM TRACING: CPT

## 2018-02-21 PROCEDURE — 88304 TISSUE EXAM BY PATHOLOGIST: CPT

## 2018-02-21 PROCEDURE — 3700000000 HC ANESTHESIA ATTENDED CARE: Performed by: ORTHOPAEDIC SURGERY

## 2018-02-21 PROCEDURE — 6370000000 HC RX 637 (ALT 250 FOR IP): Performed by: ANESTHESIOLOGY

## 2018-02-21 PROCEDURE — 3600000004 HC SURGERY LEVEL 4 BASE: Performed by: ORTHOPAEDIC SURGERY

## 2018-02-21 PROCEDURE — 84132 ASSAY OF SERUM POTASSIUM: CPT

## 2018-02-21 PROCEDURE — 6370000000 HC RX 637 (ALT 250 FOR IP): Performed by: ORTHOPAEDIC SURGERY

## 2018-02-21 PROCEDURE — 6360000002 HC RX W HCPCS: Performed by: NURSE ANESTHETIST, CERTIFIED REGISTERED

## 2018-02-21 PROCEDURE — 73080 X-RAY EXAM OF ELBOW: CPT

## 2018-02-21 PROCEDURE — 6360000002 HC RX W HCPCS: Performed by: STUDENT IN AN ORGANIZED HEALTH CARE EDUCATION/TRAINING PROGRAM

## 2018-02-21 PROCEDURE — 2580000003 HC RX 258: Performed by: ANESTHESIOLOGY

## 2018-02-21 PROCEDURE — 3600000014 HC SURGERY LEVEL 4 ADDTL 15MIN: Performed by: ORTHOPAEDIC SURGERY

## 2018-02-21 PROCEDURE — 7100000041 HC SPAR PHASE II RECOVERY - ADDTL 15 MIN: Performed by: ORTHOPAEDIC SURGERY

## 2018-02-21 PROCEDURE — 2500000003 HC RX 250 WO HCPCS: Performed by: NURSE ANESTHETIST, CERTIFIED REGISTERED

## 2018-02-21 PROCEDURE — 7100000001 HC PACU RECOVERY - ADDTL 15 MIN: Performed by: ORTHOPAEDIC SURGERY

## 2018-02-21 PROCEDURE — 3700000001 HC ADD 15 MINUTES (ANESTHESIA): Performed by: ORTHOPAEDIC SURGERY

## 2018-02-21 PROCEDURE — 88311 DECALCIFY TISSUE: CPT

## 2018-02-21 PROCEDURE — 7100000040 HC SPAR PHASE II RECOVERY - FIRST 15 MIN: Performed by: ORTHOPAEDIC SURGERY

## 2018-02-21 PROCEDURE — 6360000002 HC RX W HCPCS: Performed by: ANESTHESIOLOGY

## 2018-02-21 PROCEDURE — 82565 ASSAY OF CREATININE: CPT

## 2018-02-21 PROCEDURE — 2580000003 HC RX 258: Performed by: ORTHOPAEDIC SURGERY

## 2018-02-21 PROCEDURE — 2580000003 HC RX 258: Performed by: NURSE ANESTHETIST, CERTIFIED REGISTERED

## 2018-02-21 PROCEDURE — 82947 ASSAY GLUCOSE BLOOD QUANT: CPT

## 2018-02-21 PROCEDURE — 7100000000 HC PACU RECOVERY - FIRST 15 MIN: Performed by: ORTHOPAEDIC SURGERY

## 2018-02-21 RX ORDER — ROCURONIUM BROMIDE 10 MG/ML
INJECTION, SOLUTION INTRAVENOUS PRN
Status: DISCONTINUED | OUTPATIENT
Start: 2018-02-21 | End: 2018-02-21 | Stop reason: SDUPTHER

## 2018-02-21 RX ORDER — MIDAZOLAM HYDROCHLORIDE 1 MG/ML
INJECTION INTRAMUSCULAR; INTRAVENOUS PRN
Status: DISCONTINUED | OUTPATIENT
Start: 2018-02-21 | End: 2018-02-21 | Stop reason: SDUPTHER

## 2018-02-21 RX ORDER — GLYCOPYRROLATE 0.2 MG/ML
INJECTION INTRAMUSCULAR; INTRAVENOUS PRN
Status: DISCONTINUED | OUTPATIENT
Start: 2018-02-21 | End: 2018-02-21 | Stop reason: SDUPTHER

## 2018-02-21 RX ORDER — IBUPROFEN 800 MG/1
800 TABLET ORAL EVERY 6 HOURS PRN
Qty: 120 TABLET | Refills: 3 | Status: SHIPPED | OUTPATIENT
Start: 2018-02-21 | End: 2018-04-23

## 2018-02-21 RX ORDER — LIDOCAINE HYDROCHLORIDE 10 MG/ML
INJECTION, SOLUTION INFILTRATION; PERINEURAL PRN
Status: DISCONTINUED | OUTPATIENT
Start: 2018-02-21 | End: 2018-02-21 | Stop reason: SDUPTHER

## 2018-02-21 RX ORDER — SODIUM CHLORIDE, SODIUM LACTATE, POTASSIUM CHLORIDE, CALCIUM CHLORIDE 600; 310; 30; 20 MG/100ML; MG/100ML; MG/100ML; MG/100ML
INJECTION, SOLUTION INTRAVENOUS CONTINUOUS
Status: DISCONTINUED | OUTPATIENT
Start: 2018-02-21 | End: 2018-02-21 | Stop reason: HOSPADM

## 2018-02-21 RX ORDER — HYDROCODONE BITARTRATE AND ACETAMINOPHEN 5; 325 MG/1; MG/1
1 TABLET ORAL EVERY 6 HOURS PRN
Qty: 10 TABLET | Refills: 0 | Status: SHIPPED | OUTPATIENT
Start: 2018-02-21 | End: 2018-02-28

## 2018-02-21 RX ORDER — SODIUM CHLORIDE, SODIUM LACTATE, POTASSIUM CHLORIDE, CALCIUM CHLORIDE 600; 310; 30; 20 MG/100ML; MG/100ML; MG/100ML; MG/100ML
INJECTION, SOLUTION INTRAVENOUS CONTINUOUS PRN
Status: DISCONTINUED | OUTPATIENT
Start: 2018-02-21 | End: 2018-02-21 | Stop reason: SDUPTHER

## 2018-02-21 RX ORDER — MAGNESIUM HYDROXIDE 1200 MG/15ML
LIQUID ORAL CONTINUOUS PRN
Status: DISCONTINUED | OUTPATIENT
Start: 2018-02-21 | End: 2018-02-21 | Stop reason: HOSPADM

## 2018-02-21 RX ORDER — DOCUSATE SODIUM 100 MG/1
100 CAPSULE, LIQUID FILLED ORAL 2 TIMES DAILY PRN
Qty: 15 CAPSULE | Refills: 0 | Status: SHIPPED | OUTPATIENT
Start: 2018-02-21 | End: 2019-04-01

## 2018-02-21 RX ORDER — SODIUM CHLORIDE 0.9 % (FLUSH) 0.9 %
10 SYRINGE (ML) INJECTION EVERY 12 HOURS SCHEDULED
Status: DISCONTINUED | OUTPATIENT
Start: 2018-02-21 | End: 2018-02-21 | Stop reason: HOSPADM

## 2018-02-21 RX ORDER — FENTANYL CITRATE 50 UG/ML
INJECTION, SOLUTION INTRAMUSCULAR; INTRAVENOUS PRN
Status: DISCONTINUED | OUTPATIENT
Start: 2018-02-21 | End: 2018-02-21 | Stop reason: SDUPTHER

## 2018-02-21 RX ORDER — ONDANSETRON 2 MG/ML
INJECTION INTRAMUSCULAR; INTRAVENOUS PRN
Status: DISCONTINUED | OUTPATIENT
Start: 2018-02-21 | End: 2018-02-21 | Stop reason: SDUPTHER

## 2018-02-21 RX ORDER — LIDOCAINE HYDROCHLORIDE 10 MG/ML
1 INJECTION, SOLUTION EPIDURAL; INFILTRATION; INTRACAUDAL; PERINEURAL
Status: DISCONTINUED | OUTPATIENT
Start: 2018-02-21 | End: 2018-02-21 | Stop reason: HOSPADM

## 2018-02-21 RX ORDER — PROPOFOL 10 MG/ML
INJECTION, EMULSION INTRAVENOUS PRN
Status: DISCONTINUED | OUTPATIENT
Start: 2018-02-21 | End: 2018-02-21 | Stop reason: SDUPTHER

## 2018-02-21 RX ORDER — SODIUM CHLORIDE 0.9 % (FLUSH) 0.9 %
10 SYRINGE (ML) INJECTION PRN
Status: DISCONTINUED | OUTPATIENT
Start: 2018-02-21 | End: 2018-02-21 | Stop reason: HOSPADM

## 2018-02-21 RX ORDER — HYDRALAZINE HYDROCHLORIDE 20 MG/ML
5 INJECTION INTRAMUSCULAR; INTRAVENOUS EVERY 10 MIN PRN
Status: DISCONTINUED | OUTPATIENT
Start: 2018-02-21 | End: 2018-02-21 | Stop reason: HOSPADM

## 2018-02-21 RX ADMIN — LIDOCAINE HYDROCHLORIDE 50 MG: 10 INJECTION, SOLUTION INFILTRATION; PERINEURAL at 08:41

## 2018-02-21 RX ADMIN — SODIUM CHLORIDE, POTASSIUM CHLORIDE, SODIUM LACTATE AND CALCIUM CHLORIDE: 600; 310; 30; 20 INJECTION, SOLUTION INTRAVENOUS at 07:30

## 2018-02-21 RX ADMIN — INSULIN HUMAN 10 UNITS: 100 INJECTION, SOLUTION PARENTERAL at 07:45

## 2018-02-21 RX ADMIN — ROCURONIUM BROMIDE 50 MG: 10 INJECTION INTRAVENOUS at 08:41

## 2018-02-21 RX ADMIN — PROPOFOL 200 MG: 10 INJECTION, EMULSION INTRAVENOUS at 08:41

## 2018-02-21 RX ADMIN — FENTANYL CITRATE 50 MCG: 50 INJECTION INTRAMUSCULAR; INTRAVENOUS at 10:26

## 2018-02-21 RX ADMIN — SODIUM CHLORIDE, POTASSIUM CHLORIDE, SODIUM LACTATE AND CALCIUM CHLORIDE: 600; 310; 30; 20 INJECTION, SOLUTION INTRAVENOUS at 08:36

## 2018-02-21 RX ADMIN — GLYCOPYRROLATE 0.4 MG: 0.2 INJECTION INTRAMUSCULAR; INTRAVENOUS at 10:55

## 2018-02-21 RX ADMIN — NEOSTIGMINE METHYLSULFATE 3 MG: 1 INJECTION, SOLUTION INTRAMUSCULAR; INTRAVENOUS; SUBCUTANEOUS at 10:55

## 2018-02-21 RX ADMIN — ONDANSETRON 4 MG: 2 INJECTION, SOLUTION INTRAMUSCULAR; INTRAVENOUS at 09:24

## 2018-02-21 RX ADMIN — Medication 2 G: at 08:56

## 2018-02-21 RX ADMIN — HYDROMORPHONE HYDROCHLORIDE 0.5 MG: 1 INJECTION, SOLUTION INTRAMUSCULAR; INTRAVENOUS; SUBCUTANEOUS at 11:54

## 2018-02-21 RX ADMIN — SODIUM CHLORIDE, POTASSIUM CHLORIDE, SODIUM LACTATE AND CALCIUM CHLORIDE: 600; 310; 30; 20 INJECTION, SOLUTION INTRAVENOUS at 09:30

## 2018-02-21 RX ADMIN — INSULIN HUMAN 5 UNITS: 100 INJECTION, SOLUTION PARENTERAL at 12:13

## 2018-02-21 RX ADMIN — HYDROMORPHONE HYDROCHLORIDE 0.25 MG: 1 INJECTION, SOLUTION INTRAMUSCULAR; INTRAVENOUS; SUBCUTANEOUS at 12:24

## 2018-02-21 RX ADMIN — FENTANYL CITRATE 50 MCG: 50 INJECTION INTRAMUSCULAR; INTRAVENOUS at 10:43

## 2018-02-21 RX ADMIN — HYDROMORPHONE HYDROCHLORIDE 0.5 MG: 1 INJECTION, SOLUTION INTRAMUSCULAR; INTRAVENOUS; SUBCUTANEOUS at 11:59

## 2018-02-21 RX ADMIN — FENTANYL CITRATE 50 MCG: 50 INJECTION INTRAMUSCULAR; INTRAVENOUS at 08:41

## 2018-02-21 RX ADMIN — FENTANYL CITRATE 50 MCG: 50 INJECTION INTRAMUSCULAR; INTRAVENOUS at 09:39

## 2018-02-21 RX ADMIN — MIDAZOLAM HYDROCHLORIDE 2 MG: 1 INJECTION, SOLUTION INTRAMUSCULAR; INTRAVENOUS at 08:39

## 2018-02-21 ASSESSMENT — PULMONARY FUNCTION TESTS
PIF_VALUE: 20
PIF_VALUE: 19
PIF_VALUE: 20
PIF_VALUE: 10
PIF_VALUE: 20
PIF_VALUE: 20
PIF_VALUE: 19
PIF_VALUE: 21
PIF_VALUE: 21
PIF_VALUE: 19
PIF_VALUE: 20
PIF_VALUE: 19
PIF_VALUE: 19
PIF_VALUE: 20
PIF_VALUE: 19
PIF_VALUE: 20
PIF_VALUE: 1
PIF_VALUE: 20
PIF_VALUE: 0
PIF_VALUE: 2
PIF_VALUE: 20
PIF_VALUE: 21
PIF_VALUE: 20
PIF_VALUE: 20
PIF_VALUE: 19
PIF_VALUE: 20
PIF_VALUE: 20
PIF_VALUE: 21
PIF_VALUE: 20
PIF_VALUE: 20
PIF_VALUE: 21
PIF_VALUE: 13
PIF_VALUE: 20
PIF_VALUE: 13
PIF_VALUE: 20
PIF_VALUE: 20
PIF_VALUE: 19
PIF_VALUE: 23
PIF_VALUE: 20
PIF_VALUE: 20
PIF_VALUE: 2
PIF_VALUE: 20
PIF_VALUE: 19
PIF_VALUE: 20
PIF_VALUE: 0
PIF_VALUE: 19
PIF_VALUE: 21
PIF_VALUE: 2
PIF_VALUE: 20
PIF_VALUE: 24
PIF_VALUE: 21
PIF_VALUE: 19
PIF_VALUE: 19
PIF_VALUE: 20
PIF_VALUE: 18
PIF_VALUE: 20
PIF_VALUE: 19
PIF_VALUE: 20
PIF_VALUE: 2
PIF_VALUE: 19
PIF_VALUE: 20
PIF_VALUE: 2
PIF_VALUE: 21
PIF_VALUE: 20
PIF_VALUE: 21
PIF_VALUE: 20
PIF_VALUE: 20
PIF_VALUE: 21
PIF_VALUE: 19
PIF_VALUE: 20
PIF_VALUE: 19
PIF_VALUE: 19
PIF_VALUE: 20
PIF_VALUE: 20
PIF_VALUE: 19
PIF_VALUE: 20
PIF_VALUE: 20
PIF_VALUE: 19
PIF_VALUE: 20
PIF_VALUE: 19
PIF_VALUE: 20
PIF_VALUE: 19
PIF_VALUE: 20
PIF_VALUE: 19
PIF_VALUE: 20
PIF_VALUE: 20
PIF_VALUE: 2
PIF_VALUE: 11
PIF_VALUE: 21
PIF_VALUE: 2
PIF_VALUE: 21
PIF_VALUE: 19
PIF_VALUE: 0
PIF_VALUE: 20
PIF_VALUE: 34
PIF_VALUE: 20
PIF_VALUE: 19
PIF_VALUE: 0
PIF_VALUE: 13
PIF_VALUE: 19
PIF_VALUE: 20
PIF_VALUE: 20
PIF_VALUE: 2
PIF_VALUE: 19
PIF_VALUE: 19
PIF_VALUE: 20
PIF_VALUE: 20
PIF_VALUE: 19
PIF_VALUE: 3
PIF_VALUE: 19
PIF_VALUE: 20
PIF_VALUE: 19
PIF_VALUE: 26
PIF_VALUE: 20
PIF_VALUE: 19
PIF_VALUE: 5
PIF_VALUE: 20
PIF_VALUE: 3
PIF_VALUE: 19
PIF_VALUE: 1
PIF_VALUE: 19
PIF_VALUE: 21
PIF_VALUE: 20
PIF_VALUE: 19
PIF_VALUE: 19
PIF_VALUE: 20
PIF_VALUE: 21
PIF_VALUE: 19
PIF_VALUE: 20
PIF_VALUE: 19
PIF_VALUE: 19
PIF_VALUE: 0
PIF_VALUE: 21
PIF_VALUE: 20
PIF_VALUE: 4
PIF_VALUE: 20
PIF_VALUE: 20
PIF_VALUE: 1
PIF_VALUE: 20

## 2018-02-21 ASSESSMENT — PAIN SCALES - GENERAL
PAINLEVEL_OUTOF10: 10
PAINLEVEL_OUTOF10: 0
PAINLEVEL_OUTOF10: 5
PAINLEVEL_OUTOF10: 10
PAINLEVEL_OUTOF10: 10
PAINLEVEL_OUTOF10: 0
PAINLEVEL_OUTOF10: 5
PAINLEVEL_OUTOF10: 4

## 2018-02-21 ASSESSMENT — PAIN DESCRIPTION - DESCRIPTORS: DESCRIPTORS: ACHING

## 2018-02-21 ASSESSMENT — PAIN DESCRIPTION - ORIENTATION: ORIENTATION: LEFT

## 2018-02-21 ASSESSMENT — PAIN - FUNCTIONAL ASSESSMENT: PAIN_FUNCTIONAL_ASSESSMENT: 0-10

## 2018-02-21 ASSESSMENT — PAIN DESCRIPTION - LOCATION: LOCATION: ELBOW

## 2018-02-21 ASSESSMENT — PAIN DESCRIPTION - PAIN TYPE: TYPE: SURGICAL PAIN

## 2018-02-21 NOTE — ANESTHESIA PRE PROCEDURE
Does not apply route daily 10/2/17   Luis Felipe Andre MD   Alcohol Swabs (ALCOHOL PREP) PADS 1 each by Does not apply route as needed (glc check) 10/2/17   Luis Felipe Andre MD   glucose blood VI test strips (ASCENSIA AUTODISC VI;ONE TOUCH ULTRA TEST VI) strip Dx: DM type 2, use daily with insulin 10/2/17   Luis Felipe Andre MD   Elastic Bandages & Supports (ELBOW BRACE) MISC 1 Device by Does not apply route as needed (elbow pain) 9/18/17   Luis Felipe Andre MD       Current medications:    No current facility-administered medications for this encounter. Allergies:  No Known Allergies    Problem List:    Patient Active Problem List   Diagnosis Code    HLD (hyperlipidemia) E78.5    HTN (hypertension) I10    DM (diabetes mellitus) ([facility] [address] E11.9    Chronic back pain M54.9, G89.29    Thoracic aortic aneurysm diagnosed  2010? at [facility] I71.2       Past Medical History:        Diagnosis Date    Arthritis     general    Cerebral artery occlusion with cerebral infarction (HCC)     mild    Chronic back pain     Elbow pain, chronic     Left    Full dentures     Fungus infection     bilat.  feet    Hyperlipidemia     Hypertension     Thoracic aortic aneurysm diagnosed  2010 at [facility] Type II or unspecified type diabetes mellitus without mention of complication, not stated as uncontrolled        Past Surgical History:        Procedure Laterality Date    COLONOSCOPY  8/25/14    biopsy    DENTAL SURGERY      all teeth removed    THORACIC AORTIC ANEURYSM REPAIR  march 14th 2012    AORTIC ANEURYSM REPAIR       Social History:    Social History   Substance Use Topics    Smoking status: Former Smoker     Packs/day: 0.25     Quit date: 10/1/1998    Smokeless tobacco: Never Used    Alcohol use 1.8 oz/week     3 Cans of beer per week      Comment: every weekend                                Counseling given: Not Answered      Vital Signs (Current):   Vitals:    02/19/18 1046 02/21/18 0639 02/21/18 Cardiovascular:  Exercise tolerance: good (>4 METS),   (+) hypertension:,     (-) past MI, CAD, CABG/stent, dysrhythmias and  angina    ECG reviewed      Echocardiogram reviewed         Beta Blocker:  Not on Beta Blocker         Neuro/Psych:   (+) CVA: no interval change,             GI/Hepatic/Renal:   (+) morbid obesity     (-) liver disease and no renal disease       Endo/Other:                     Abdominal:           Vascular:                                        Anesthesia Plan      general     ASA 3       Induction: intravenous. MIPS: Postoperative opioids intended and Prophylactic antiemetics administered. Anesthetic plan and risks discussed with patient.       Plan discussed with CRNA and surgical team.                  Juan Gu MD   2/21/2018

## 2018-02-21 NOTE — H&P
by mouth once daily 5/16/17  Yes Yunior Greene MD   metoprolol (LOPRESSOR) 100 MG tablet Take 1 tablet by mouth 2 times daily 3/28/17 2/19/18 Yes Yunior Greene MD   metFORMIN (GLUCOPHAGE) 1000 MG tablet take 1 tablet by mouth twice a day with food 3/28/17  Yes Yunior Greene MD   aspirin EC 81 MG EC tablet Take 1 tablet by mouth daily  Patient taking differently: Take 81 mg by mouth daily Pt. Stopped 2-11-18 for OR 3/28/17  Yes Yunior Greene MD   LANCETS ULTRA THIN MISC 1 each by Does not apply route as needed (blood glc check) 10/2/17   Leonard Wetzel MD   Insulin Pen Needle (B-D ULTRAFINE III SHORT PEN) 31G X 8 MM MISC 1 each by Does not apply route daily 10/2/17   Leonard Wetzel MD   Alcohol Swabs (ALCOHOL PREP) PADS 1 each by Does not apply route as needed (glc check) 10/2/17   Leonard Wetzel MD   glucose blood VI test strips (ASCENSIA AUTODISC VI;ONE TOUCH ULTRA TEST VI) strip Dx: DM type 2, use daily with insulin 10/2/17   Leonard Wetzel MD   Elastic Bandages & Supports ([place]) MISC 1 Device by Does not apply route as needed (elbow pain) 9/18/17   Leonard Wetzel MD       Allergies:    Review of patient's allergies indicates no known allergies.     Social History:   Social History     Social History    Marital status:      Spouse name: N/A    Number of children: N/A    Years of education: N/A     Social History Main Topics    Smoking status: Former Smoker     Packs/day: 0.25     Quit date: 10/1/1998    Smokeless tobacco: Never Used    Alcohol use 1.8 oz/week     3 Cans of beer per week      Comment: every weekend    Drug use: Yes     Types: Marijuana      Comment: last use 2/2017    Sexual activity: Yes     Partners: Female     Other Topics Concern    None     Social History Narrative    None       Family History:  Family History   Problem Relation Age of Onset    High Blood Pressure Mother     Diabetes Mother     Cancer Father     Heart Disease Father     Stroke Brother     Cirrhosis

## 2018-02-23 DIAGNOSIS — I10 ESSENTIAL HYPERTENSION: ICD-10-CM

## 2018-02-23 LAB — SURGICAL PATHOLOGY REPORT: NORMAL

## 2018-02-24 ENCOUNTER — HOSPITAL ENCOUNTER (EMERGENCY)
Age: 58
Discharge: HOME | End: 2018-02-24
Attending: EMERGENCY MEDICINE
Payer: COMMERCIAL

## 2018-02-24 VITALS
TEMPERATURE: 97.9 F | HEIGHT: 68 IN | WEIGHT: 195 LBS | OXYGEN SATURATION: 96 % | BODY MASS INDEX: 29.55 KG/M2 | RESPIRATION RATE: 17 BRPM | HEART RATE: 73 BPM | DIASTOLIC BLOOD PRESSURE: 75 MMHG | SYSTOLIC BLOOD PRESSURE: 146 MMHG

## 2018-02-24 DIAGNOSIS — R22.32 LOCALIZED SWELLING ON LEFT HAND: Primary | ICD-10-CM

## 2018-02-24 LAB
ABSOLUTE EOS #: 0.08 K/UL (ref 0–0.44)
ABSOLUTE IMMATURE GRANULOCYTE: 0.03 K/UL (ref 0–0.3)
ABSOLUTE LYMPH #: 1.87 K/UL (ref 1.1–3.7)
ABSOLUTE MONO #: 0.44 K/UL (ref 0.1–1.2)
ANION GAP SERPL CALCULATED.3IONS-SCNC: 12 MMOL/L (ref 9–17)
BASOPHILS # BLD: 1 % (ref 0–2)
BASOPHILS ABSOLUTE: 0.04 K/UL (ref 0–0.2)
BUN BLDV-MCNC: 12 MG/DL (ref 6–20)
BUN/CREAT BLD: ABNORMAL (ref 9–20)
C-REACTIVE PROTEIN: 9.2 MG/L (ref 0–5)
CALCIUM SERPL-MCNC: 9.6 MG/DL (ref 8.6–10.4)
CHLORIDE BLD-SCNC: 96 MMOL/L (ref 98–107)
CO2: 27 MMOL/L (ref 20–31)
CREAT SERPL-MCNC: 0.85 MG/DL (ref 0.7–1.2)
DIFFERENTIAL TYPE: ABNORMAL
EOSINOPHILS RELATIVE PERCENT: 1 % (ref 1–4)
GFR AFRICAN AMERICAN: >60 ML/MIN
GFR NON-AFRICAN AMERICAN: >60 ML/MIN
GFR SERPL CREATININE-BSD FRML MDRD: ABNORMAL ML/MIN/{1.73_M2}
GFR SERPL CREATININE-BSD FRML MDRD: ABNORMAL ML/MIN/{1.73_M2}
GLUCOSE BLD-MCNC: 312 MG/DL (ref 70–99)
HCT VFR BLD CALC: 37.7 % (ref 40.7–50.3)
HEMOGLOBIN: 11.5 G/DL (ref 13–17)
IMMATURE GRANULOCYTES: 0 %
LYMPHOCYTES # BLD: 25 % (ref 24–43)
MCH RBC QN AUTO: 28.6 PG (ref 25.2–33.5)
MCHC RBC AUTO-ENTMCNC: 30.5 G/DL (ref 28.4–34.8)
MCV RBC AUTO: 93.8 FL (ref 82.6–102.9)
MONOCYTES # BLD: 6 % (ref 3–12)
NRBC AUTOMATED: 0 PER 100 WBC
PDW BLD-RTO: 11.8 % (ref 11.8–14.4)
PLATELET # BLD: 276 K/UL (ref 138–453)
PLATELET ESTIMATE: ABNORMAL
PMV BLD AUTO: 10.8 FL (ref 8.1–13.5)
POTASSIUM SERPL-SCNC: 4.5 MMOL/L (ref 3.7–5.3)
RBC # BLD: 4.02 M/UL (ref 4.21–5.77)
RBC # BLD: ABNORMAL 10*6/UL
SEG NEUTROPHILS: 67 % (ref 36–65)
SEGMENTED NEUTROPHILS ABSOLUTE COUNT: 4.92 K/UL (ref 1.5–8.1)
SODIUM BLD-SCNC: 135 MMOL/L (ref 135–144)
WBC # BLD: 7.4 K/UL (ref 3.5–11.3)
WBC # BLD: ABNORMAL 10*3/UL

## 2018-02-24 PROCEDURE — 85025 COMPLETE CBC W/AUTO DIFF WBC: CPT

## 2018-02-24 PROCEDURE — 86140 C-REACTIVE PROTEIN: CPT

## 2018-02-24 PROCEDURE — 99284 EMERGENCY DEPT VISIT MOD MDM: CPT

## 2018-02-24 PROCEDURE — 96374 THER/PROPH/DIAG INJ IV PUSH: CPT

## 2018-02-24 PROCEDURE — 93971 EXTREMITY STUDY: CPT

## 2018-02-24 PROCEDURE — 6360000002 HC RX W HCPCS: Performed by: EMERGENCY MEDICINE

## 2018-02-24 PROCEDURE — 80048 BASIC METABOLIC PNL TOTAL CA: CPT

## 2018-02-24 RX ORDER — MORPHINE SULFATE 4 MG/ML
4 INJECTION, SOLUTION INTRAMUSCULAR; INTRAVENOUS ONCE
Status: COMPLETED | OUTPATIENT
Start: 2018-02-24 | End: 2018-02-24

## 2018-02-24 RX ADMIN — MORPHINE SULFATE 4 MG: 4 INJECTION INTRAVENOUS at 12:04

## 2018-02-24 ASSESSMENT — ENCOUNTER SYMPTOMS
SHORTNESS OF BREATH: 0
VOMITING: 0
SORE THROAT: 0
ABDOMINAL PAIN: 0
NAUSEA: 0

## 2018-02-24 ASSESSMENT — PAIN SCALES - GENERAL
PAINLEVEL_OUTOF10: 8
PAINLEVEL_OUTOF10: 8

## 2018-02-24 ASSESSMENT — PAIN DESCRIPTION - ORIENTATION: ORIENTATION: LEFT

## 2018-02-24 ASSESSMENT — PAIN DESCRIPTION - PAIN TYPE: TYPE: ACUTE PAIN

## 2018-02-24 ASSESSMENT — PAIN DESCRIPTION - LOCATION: LOCATION: HAND;ELBOW

## 2018-02-24 NOTE — CONSULTS
Lata Barnett      CC/Reason for consult:  Left arm swelling    HPI:      The patient is a 56 y.o. male who is s/p left elbow arthroscopy with synovectomy and loose body removal dos 2/21/18. Patient states he was doing well at home after surgery, but then noticed increased swelling to this forearm and hand yesterday. It worsened today so he came to the ER today to be evaluated. He noted some numbness in his palm and also thought he had some skin discoloration, but pain has been controlled. No fevers, chills, cp, sob. Past Medical History:    Past Medical History:   Diagnosis Date    Arthritis     general    Cerebral artery occlusion with cerebral infarction (HCC)     mild    Chronic back pain     Elbow pain, chronic     Left    Full dentures     Fungus infection     bilat. feet    Hyperlipidemia     Hypertension     Thoracic aortic aneurysm diagnosed  2010 at [facility] Type II or unspecified type diabetes mellitus without mention of complication, not stated as uncontrolled        Past Surgical History:    Past Surgical History:   Procedure Laterality Date    COLONOSCOPY  8/25/14    biopsy    DENTAL SURGERY      all teeth removed    ELBOW ARTHROSCOPY Right 02/21/2018    with loose body removal    IA ELBOW ARTHROSCOP,DIAGNOSTIC Left 2/21/2018    ELBOW ARTHROSCOPY, REMOVAL OF LOOSE BODY AND DEBRIDEMENT ([address], NORMAL-SCOPE EQUIPMENT, ARM RYAN, LATERAL W/BEAN BAG, 3080 TABLE, SIMPLE SLING) performed by Jeromy Cruz DO at [address] [facility]  march 14th 2012    AORTIC ANEURYSM REPAIR       Medications Prior to Admission:   Prior to Admission medications    Medication Sig Start Date End Date Taking? Authorizing Provider   HYDROcodone-acetaminophen (NORCO) 5-325 MG per tablet Take 1 tablet by mouth every 6 hours as needed for Pain for up to 7 days.  2/21/18 2/28/18  Paige Porter DO   ibuprofen (ADVIL;MOTRIN) 800 MG tablet Take 1 tablet LUE: No ecchymoses, abrasion, deformity, erythema or lacerations. Sutures in place to portal incisions. No erythema drainage or signs of infection. Minimal swelling about elbow. Noted moderate edema/soft tissue swelling to the hand and distal forearm. Skin intact. No TTP or crepitus to shoulder, arm. PROM . AROM similar. No pain with passive flexion or extension of wrist and fingers. Compartments soft and compressible. Hand is warm and perfused. Axillary/MSC/Ulnar/Median/AIN/PIN motor intact. C4-T1 SILT. Radial pulse 2+ with BCR. LABS:  Recent Labs      02/24/18   1203   WBC  7.4   HGB  11.5*   HCT  37.7*   PLT  276   NA  135   K  4.5   BUN  12   CREATININE  0.85   GLUCOSE  312*   CRP  9.2*      A/P: 56 y.o. male being seen for left forearm/hand swelling s/p elbow arthroscopy pod4    - No acute orthopedic surgical intervention indicated at this time  - Weight bearing as tolerated  - Pain control and medical management per ED  - Recommend 1x toradol shot and continued NSAID use  - Encouraged to continue active ROM of elbow wrist hand and fingers  - Ice (20 minutes on and off 1 hour) and elevate (above heart) as needed for swelling/pain  - No leukocytosis, crp 9.2  - Will discuss case with Dr. Tana Turner to be discharged from an orthopedic standpoint.   - Follow up with Dr. Joanie Campbell as jesica, call [phone] to schedule an appointment.   - Please page DO Ortho with any questions or concerns    Lavell Lynn DO  12:58 PM 2/24/2018

## 2018-02-24 NOTE — ED NOTES
Pt updated with test results and the decision to discharge and to f/u with Orthopedic surgery. Pt agreeable to plan. Ace wrap with ABD gauze applied to left arm.      Morteza Arnold RN  02/24/18 [id number]

## 2018-02-24 NOTE — ED PROVIDER NOTES
[facility]  Emergency Department  Faculty Attestation     I performed a history and physical examination of the patient and discussed management with the resident. I reviewed the residents note and agree with the documented findings and plan of care. Any areas of disagreement are noted on the chart. I was personally present for the key portions of any procedures. I have documented in the chart those procedures where I was not present during the key portions. I have reviewed the emergency nurses triage note. I agree with the chief complaint, past medical history, past surgical history, allergies, medications, social and family history as documented unless otherwise noted below. For Physician Assistant/ Nurse Practitioner cases/documentation I have personally evaluated this patient and have completed at least one if not all key elements of the E/M (history, physical exam, and MDM). Additional findings are as noted.       Primary Care Physician:  Valerie Montero MD    Screenings:  [unfilled]    CHIEF COMPLAINT       Chief Complaint   Patient presents with    Arm Swelling     left hand swelling, elbow surgery on wednesday       RECENT VITALS:   Temp: 97.9 °F (36.6 °C),  Pulse: 73, Resp: 17, BP: (!) 152/76    LABS:  Labs Reviewed   CBC WITH AUTO DIFFERENTIAL - Abnormal; Notable for the following:        Result Value    RBC 4.02 (*)     Hemoglobin 11.5 (*)     Hematocrit 37.7 (*)     Seg Neutrophils 67 (*)     All other components within normal limits   BASIC METABOLIC PANEL - Abnormal; Notable for the following:     Glucose 312 (*)     Chloride 96 (*)     All other components within normal limits   C-REACTIVE PROTEIN - Abnormal; Notable for the following:     CRP 9.2 (*)     All other components within normal limits       Radiology  VL DUP UPPER EXTREMITY VENOUS LEFT    (Results Pending)           Attending Physician Additional  Notes    Patient is 3 days status post arthroscopic
Granulocyte 0.03 0.00 - 0.30 k/uL    WBC Morphology NOT REPORTED     RBC Morphology NOT REPORTED     Platelet Estimate NOT REPORTED    BASIC METABOLIC PANEL   Result Value Ref Range    Glucose 312 (H) 70 - 99 mg/dL    BUN 12 6 - 20 mg/dL    CREATININE 0.85 0.70 - 1.20 mg/dL    Bun/Cre Ratio NOT REPORTED 9 - 20    Calcium 9.6 8.6 - 10.4 mg/dL    Sodium 135 135 - 144 mmol/L    Potassium 4.5 3.7 - 5.3 mmol/L    Chloride 96 (L) 98 - 107 mmol/L    CO2 27 20 - 31 mmol/L    Anion Gap 12 9 - 17 mmol/L    GFR Non-African American >60 >60 mL/min    GFR African American >60 >60 mL/min    GFR Comment          GFR Staging NOT REPORTED    C-REACTIVE PROTEIN   Result Value Ref Range    CRP 9.2 (H) 0.0 - 5.0 mg/L       IMPRESSION: 53-year-old male presenting with left hand swelling. Several possible DVT will obtain Dopplers of the left upper extremity. Also obtain basic blood work. Minimal signs of warmth to the left elbow. Diffuse edema to the left hand quickly secondary to gravity dependence. RADIOLOGY:  No results found. EKG  None    EMERGENCY DEPARTMENT COURSE:  1346: Evaluated by orthopedic surgery at bedside. Does not believe wound to be infected. Advised patient to take Motrin as needed for his pain and will help with his blood pressure. The staff follow-up with neurosurgery in approximately 2 weeks. 1407: Vascular study of the left upper extremity negative for any acute DVT. I did patient on laboratory workup and imaging. Patient follow-up with orthopedic surgery for further evaluation of his left elbow. PROCEDURES:  Procedures    CONSULTS:  IP CONSULT TO ORTHOPEDIC SURGERY    CRITICAL CARE:  None    FINAL IMPRESSION      1.  Localized swelling on left hand          DISPOSITION / PLAN     DISPOSITION Decision To Discharge 02/24/2018 02:05:27 PM      PATIENT REFERRED TO:  Markus Gutierrez, [address]  [address] [place] [address]  [phone]    Schedule an appointment as soon as possible for a

## 2018-02-28 DIAGNOSIS — I10 ESSENTIAL HYPERTENSION: ICD-10-CM

## 2018-02-28 RX ORDER — METOPROLOL TARTRATE 100 MG/1
TABLET ORAL
Qty: 60 TABLET | Refills: 3 | Status: SHIPPED | OUTPATIENT
Start: 2018-02-28 | End: 2018-11-30

## 2018-02-28 RX ORDER — METOPROLOL TARTRATE 100 MG/1
TABLET ORAL
Qty: 60 TABLET | Refills: 3 | Status: SHIPPED | OUTPATIENT
Start: 2018-02-28 | End: 2020-06-12 | Stop reason: SDUPTHER

## 2018-02-28 NOTE — TELEPHONE ENCOUNTER
Medication is on med list please review and address    Please address the medication refill and close the encounter. If I can be of assistance, please route to the applicable pool. Thank you. Next Visit Date:  Future Appointments  Date Time Provider Cuca Daviesi   3/7/2018 2:10 PM SCHEDULE, P [facility] [address] Maintenance   Topic Date Due    Shingles Vaccine (1 of 2 - 2 Dose Series) 04/27/2012    Diabetic retinal exam  03/30/2016    Lipid screen  02/11/2017    A1C test (Diabetic or Prediabetic)  12/18/2017    Flu vaccine (1) 11/09/2018 (Originally 9/1/2017)    Diabetic foot exam  09/18/2018    Diabetic microalbuminuria test  09/18/2018    Potassium monitoring  02/24/2019    Creatinine monitoring  02/24/2019    Colon cancer screen colonoscopy  08/25/2024    DTaP/Tdap/Td vaccine (2 - Td) 06/16/2027    Pneumococcal med risk  Completed    Hepatitis C screen  Addressed    HIV screen  Addressed       Hemoglobin A1C (%)   Date Value   09/18/2017 9.4   11/22/2016 7.1   08/22/2016 7.6             ( goal A1C is < 7)   Microalb/Crt. Ratio (mcg/mg creat)   Date Value   02/11/2016 274     LDL Cholesterol (mg/dL)   Date Value   02/11/2016 89     LDL Calculated (mg/dL)   Date Value   07/24/2013 120       (goal LDL is <100)   AST (U/L)   Date Value   02/11/2016 19     ALT (U/L)   Date Value   02/11/2016 11     BUN (mg/dL)   Date Value   02/24/2018 12     BP Readings from Last 3 Encounters:   02/24/18 (!) 146/75   02/21/18 (!) 172/83   02/21/18 (!) 164/72          (goal 120/80)    All Future Testing planned in [facility]  Lab Frequency Next Occurrence               Patient Active Problem List:     HLD (hyperlipidemia)     HTN (hypertension)     DM (diabetes mellitus) (HCC)     Chronic back pain     Thoracic aortic aneurysm diagnosed  2010?  at [facility]

## 2018-03-07 ENCOUNTER — OFFICE VISIT (OUTPATIENT)
Dept: ORTHOPEDIC SURGERY | Age: 58
End: 2018-03-07

## 2018-03-07 VITALS
WEIGHT: 195.11 LBS | SYSTOLIC BLOOD PRESSURE: 133 MMHG | BODY MASS INDEX: 29.57 KG/M2 | DIASTOLIC BLOOD PRESSURE: 81 MMHG | HEIGHT: 68 IN | HEART RATE: 93 BPM

## 2018-03-07 DIAGNOSIS — M24.022 LOOSE BODY(IES), JOINT, ELBOW, LEFT: Primary | ICD-10-CM

## 2018-03-07 PROCEDURE — 99024 POSTOP FOLLOW-UP VISIT: CPT | Performed by: ORTHOPAEDIC SURGERY

## 2018-03-07 NOTE — LETTER
[address] [place] [id number]  Phone: [phone]  Fax: [phone]    Farooq Son DO        March 7, 2018     Patient: Yvonne Gloria   YOB: 1960   Date of Visit: 3/7/2018       To Whom It May Concern: It is my medical opinion that Yvonne  may return to work on 3/19/18. If you have any questions or concerns, please don't hesitate to call.     Sincerely,        Farooq Son DO

## 2018-03-07 NOTE — PROGRESS NOTES
S: Pt is a 56 y.o. male being seen for first postoperative visit status post left elbow arthroscopy with loose body body removal on 2/21/2018. His pain is well-controlled with Motrin. He has been doing gentle range of motion exercises at home. He has not returned to work yet. He denies any issues with the incision. Admits to some burning and dysesthesias in the hand for the first 3 days postoperatively but this is completely resolved at this time. Denies any fevers or chills. O:   Vitals:    03/07/18 1421   BP: 133/81   Pulse: 93   Body mass index is 29.67 kg/m². Gen: A&O, NAD, cooperative    Left upper extremity: Moderate swelling of the elbow and distal into the hand. Incisions intact with no erythema, fluctuance or drainage concerning for infection. soft compartments. 2+ rad pulse. AIN/PIN/rad/u/med motor intact. Radial/median/ulnar sensation intact to light touch. Elbow range of motion: ° extension to flexion, and 80-80° pronation to supination.       Radiology  None    A/P: 56 y.o. male 2 weeks status post left elbow arthroscopy with loose body removal  - Activity as tolerated  - Okay to shower do not soak incision for 24 hours  - Sutures were removed today  - Range of motion exercises demonstrated, we will defer from formal physical therapy for now  - Ice and compression for swelling  - Return to clinic in 4 weeks for range of motion check    Su Emmanuel DO   3:01 PM 3/7/2018

## 2018-03-27 NOTE — TELEPHONE ENCOUNTER
Please address the medication refill and close the encounter. If I can be of assistance, please route to the applicable pool. Thank you. Next Visit Date:  Future Appointments  Date Time Provider Cuca Lu   4/2/2018 1:10 PM SCHEDULE, [facility] [address] Maintenance   Topic Date Due    Shingles Vaccine (1 of 2 - 2 Dose Series) 04/27/2012    Diabetic retinal exam  03/30/2016    Lipid screen  02/11/2017    Flu vaccine (1) 11/09/2018 (Originally 9/1/2017)    Diabetic foot exam  09/18/2018    Diabetic microalbuminuria test  09/18/2018    A1C test (Diabetic or Prediabetic)  09/28/2018    Potassium monitoring  02/24/2019    Creatinine monitoring  02/24/2019    Colon cancer screen colonoscopy  08/25/2024    DTaP/Tdap/Td vaccine (2 - Td) 06/16/2027    Pneumococcal med risk  Completed    Hepatitis C screen  Addressed    HIV screen  Addressed       Hemoglobin A1C (%)   Date Value   09/28/2017 8   09/18/2017 9.4   11/22/2016 7.1             ( goal A1C is < 7)   Microalb/Crt. Ratio (mcg/mg creat)   Date Value   02/11/2016 274     LDL Cholesterol (mg/dL)   Date Value   02/11/2016 89     LDL Calculated (mg/dL)   Date Value   07/24/2013 120       (goal LDL is <100)   AST (U/L)   Date Value   02/11/2016 19     ALT (U/L)   Date Value   02/11/2016 11     BUN (mg/dL)   Date Value   02/24/2018 12     BP Readings from Last 3 Encounters:   03/07/18 133/81   02/24/18 (!) 146/75   02/21/18 (!) 172/83          (goal 120/80)    All Future Testing planned in [facility]  Lab Frequency Next Occurrence               Patient Active Problem List:     HLD (hyperlipidemia)     HTN (hypertension)     DM (diabetes mellitus) (HCC)     Chronic back pain     Thoracic aortic aneurysm diagnosed  2010?  at [facility]

## 2018-03-28 RX ORDER — SIMVASTATIN 20 MG
TABLET ORAL
Qty: 30 TABLET | Refills: 0 | Status: SHIPPED | OUTPATIENT
Start: 2018-03-28 | End: 2018-05-11 | Stop reason: SDUPTHER

## 2018-04-03 RX ORDER — LISINOPRIL 40 MG/1
TABLET ORAL
Qty: 30 TABLET | Refills: 3 | Status: SHIPPED | OUTPATIENT
Start: 2018-04-03 | End: 2018-08-01 | Stop reason: SDUPTHER

## 2018-04-23 ENCOUNTER — APPOINTMENT (OUTPATIENT)
Dept: GENERAL RADIOLOGY | Age: 58
End: 2018-04-23
Payer: COMMERCIAL

## 2018-04-23 ENCOUNTER — HOSPITAL ENCOUNTER (EMERGENCY)
Age: 58
Discharge: HOME | End: 2018-04-23
Attending: EMERGENCY MEDICINE
Payer: COMMERCIAL

## 2018-04-23 VITALS
BODY MASS INDEX: 29.55 KG/M2 | WEIGHT: 195 LBS | RESPIRATION RATE: 18 BRPM | DIASTOLIC BLOOD PRESSURE: 98 MMHG | HEIGHT: 68 IN | OXYGEN SATURATION: 97 % | SYSTOLIC BLOOD PRESSURE: 183 MMHG | HEART RATE: 87 BPM | TEMPERATURE: 98.2 F

## 2018-04-23 DIAGNOSIS — S46.911A STRAIN OF RIGHT SHOULDER, INITIAL ENCOUNTER: Primary | ICD-10-CM

## 2018-04-23 DIAGNOSIS — I10 ESSENTIAL HYPERTENSION: ICD-10-CM

## 2018-04-23 PROCEDURE — 6370000000 HC RX 637 (ALT 250 FOR IP): Performed by: STUDENT IN AN ORGANIZED HEALTH CARE EDUCATION/TRAINING PROGRAM

## 2018-04-23 PROCEDURE — 96372 THER/PROPH/DIAG INJ SC/IM: CPT

## 2018-04-23 PROCEDURE — 99283 EMERGENCY DEPT VISIT LOW MDM: CPT

## 2018-04-23 PROCEDURE — 6360000002 HC RX W HCPCS: Performed by: STUDENT IN AN ORGANIZED HEALTH CARE EDUCATION/TRAINING PROGRAM

## 2018-04-23 PROCEDURE — 73030 X-RAY EXAM OF SHOULDER: CPT

## 2018-04-23 RX ORDER — KETOROLAC TROMETHAMINE 15 MG/ML
15 INJECTION, SOLUTION INTRAMUSCULAR; INTRAVENOUS ONCE
Status: COMPLETED | OUTPATIENT
Start: 2018-04-23 | End: 2018-04-23

## 2018-04-23 RX ORDER — ACETAMINOPHEN 325 MG/1
325 TABLET ORAL EVERY 6 HOURS PRN
Qty: 90 TABLET | Refills: 0 | Status: SHIPPED | OUTPATIENT
Start: 2018-04-23 | End: 2018-11-08 | Stop reason: SDUPTHER

## 2018-04-23 RX ORDER — CYCLOBENZAPRINE HCL 10 MG
10 TABLET ORAL ONCE
Status: COMPLETED | OUTPATIENT
Start: 2018-04-23 | End: 2018-04-23

## 2018-04-23 RX ORDER — IBUPROFEN 400 MG/1
400 TABLET ORAL EVERY 6 HOURS PRN
Qty: 90 TABLET | Refills: 0 | Status: SHIPPED | OUTPATIENT
Start: 2018-04-23 | End: 2018-11-08 | Stop reason: SDUPTHER

## 2018-04-23 RX ADMIN — KETOROLAC TROMETHAMINE 15 MG: 15 INJECTION, SOLUTION INTRAMUSCULAR; INTRAVENOUS at 09:34

## 2018-04-23 RX ADMIN — CYCLOBENZAPRINE HYDROCHLORIDE 10 MG: 10 TABLET, FILM COATED ORAL at 09:34

## 2018-04-23 ASSESSMENT — ENCOUNTER SYMPTOMS
SHORTNESS OF BREATH: 0
WHEEZING: 0
BACK PAIN: 0
ABDOMINAL PAIN: 0

## 2018-04-23 ASSESSMENT — PAIN SCALES - GENERAL
PAINLEVEL_OUTOF10: 10
PAINLEVEL_OUTOF10: 10

## 2018-04-23 ASSESSMENT — PAIN DESCRIPTION - LOCATION: LOCATION: SHOULDER

## 2018-04-24 RX ORDER — AMLODIPINE BESYLATE 10 MG/1
TABLET ORAL
Qty: 30 TABLET | Refills: 5 | Status: SHIPPED | OUTPATIENT
Start: 2018-04-24 | End: 2018-10-16 | Stop reason: SDUPTHER

## 2018-05-09 RX ORDER — CLOPIDOGREL BISULFATE 75 MG/1
TABLET ORAL
Qty: 30 TABLET | Refills: 0 | Status: SHIPPED | OUTPATIENT
Start: 2018-05-09 | End: 2018-06-13 | Stop reason: SDUPTHER

## 2018-05-14 RX ORDER — SIMVASTATIN 20 MG
TABLET ORAL
Qty: 30 TABLET | Refills: 0 | Status: SHIPPED | OUTPATIENT
Start: 2018-05-14 | End: 2018-06-13 | Stop reason: SDUPTHER

## 2018-05-17 ENCOUNTER — OFFICE VISIT (OUTPATIENT)
Dept: FAMILY MEDICINE CLINIC | Age: 58
End: 2018-05-17
Payer: COMMERCIAL

## 2018-05-17 VITALS
TEMPERATURE: 97.4 F | BODY MASS INDEX: 29.67 KG/M2 | HEIGHT: 68 IN | SYSTOLIC BLOOD PRESSURE: 156 MMHG | DIASTOLIC BLOOD PRESSURE: 88 MMHG | WEIGHT: 195.8 LBS | HEART RATE: 70 BPM

## 2018-05-17 DIAGNOSIS — E11.9 TYPE 2 DIABETES MELLITUS WITHOUT COMPLICATION, WITHOUT LONG-TERM CURRENT USE OF INSULIN (HCC): Primary | ICD-10-CM

## 2018-05-17 DIAGNOSIS — I10 ESSENTIAL HYPERTENSION: ICD-10-CM

## 2018-05-17 LAB — HBA1C MFR BLD: 8.2 %

## 2018-05-17 PROCEDURE — G8427 DOCREV CUR MEDS BY ELIG CLIN: HCPCS | Performed by: STUDENT IN AN ORGANIZED HEALTH CARE EDUCATION/TRAINING PROGRAM

## 2018-05-17 PROCEDURE — 99213 OFFICE O/P EST LOW 20 MIN: CPT

## 2018-05-17 PROCEDURE — 1036F TOBACCO NON-USER: CPT | Performed by: STUDENT IN AN ORGANIZED HEALTH CARE EDUCATION/TRAINING PROGRAM

## 2018-05-17 PROCEDURE — 99213 OFFICE O/P EST LOW 20 MIN: CPT | Performed by: STUDENT IN AN ORGANIZED HEALTH CARE EDUCATION/TRAINING PROGRAM

## 2018-05-17 PROCEDURE — 3017F COLORECTAL CA SCREEN DOC REV: CPT | Performed by: STUDENT IN AN ORGANIZED HEALTH CARE EDUCATION/TRAINING PROGRAM

## 2018-05-17 PROCEDURE — G8417 CALC BMI ABV UP PARAM F/U: HCPCS | Performed by: STUDENT IN AN ORGANIZED HEALTH CARE EDUCATION/TRAINING PROGRAM

## 2018-05-17 PROCEDURE — 2022F DILAT RTA XM EVC RTNOPTHY: CPT | Performed by: STUDENT IN AN ORGANIZED HEALTH CARE EDUCATION/TRAINING PROGRAM

## 2018-05-17 PROCEDURE — 3046F HEMOGLOBIN A1C LEVEL >9.0%: CPT | Performed by: STUDENT IN AN ORGANIZED HEALTH CARE EDUCATION/TRAINING PROGRAM

## 2018-05-17 PROCEDURE — 83036 HEMOGLOBIN GLYCOSYLATED A1C: CPT | Performed by: STUDENT IN AN ORGANIZED HEALTH CARE EDUCATION/TRAINING PROGRAM

## 2018-05-17 RX ORDER — HYDROCHLOROTHIAZIDE 25 MG/1
25 TABLET ORAL DAILY
Qty: 30 TABLET | Refills: 3 | Status: SHIPPED | OUTPATIENT
Start: 2018-05-17 | End: 2018-08-29 | Stop reason: SDUPTHER

## 2018-05-17 ASSESSMENT — ENCOUNTER SYMPTOMS: BACK PAIN: 0

## 2018-06-13 RX ORDER — CLOPIDOGREL BISULFATE 75 MG/1
TABLET ORAL
Qty: 30 TABLET | Refills: 0 | Status: SHIPPED | OUTPATIENT
Start: 2018-06-13 | End: 2018-07-15 | Stop reason: SDUPTHER

## 2018-06-13 RX ORDER — SIMVASTATIN 20 MG
TABLET ORAL
Qty: 30 TABLET | Refills: 0 | Status: SHIPPED | OUTPATIENT
Start: 2018-06-13 | End: 2018-07-15 | Stop reason: SDUPTHER

## 2018-06-18 RX ORDER — METOPROLOL TARTRATE 100 MG/1
TABLET ORAL
Qty: 60 TABLET | Refills: 3 | Status: SHIPPED | OUTPATIENT
Start: 2018-06-18 | End: 2018-11-22 | Stop reason: SDUPTHER

## 2018-06-18 RX ORDER — GLIPIZIDE 10 MG/1
TABLET, FILM COATED, EXTENDED RELEASE ORAL
Qty: 60 TABLET | Refills: 4 | Status: SHIPPED | OUTPATIENT
Start: 2018-06-18 | End: 2019-04-07 | Stop reason: SDUPTHER

## 2018-07-02 ENCOUNTER — OFFICE VISIT (OUTPATIENT)
Dept: FAMILY MEDICINE CLINIC | Age: 58
End: 2018-07-02
Payer: COMMERCIAL

## 2018-07-02 VITALS
BODY MASS INDEX: 28.95 KG/M2 | HEIGHT: 68 IN | SYSTOLIC BLOOD PRESSURE: 126 MMHG | TEMPERATURE: 97.8 F | DIASTOLIC BLOOD PRESSURE: 82 MMHG | HEART RATE: 71 BPM | WEIGHT: 191 LBS

## 2018-07-02 DIAGNOSIS — Z13.220 SCREENING FOR HYPERLIPIDEMIA: ICD-10-CM

## 2018-07-02 DIAGNOSIS — M25.511 CHRONIC RIGHT SHOULDER PAIN: ICD-10-CM

## 2018-07-02 DIAGNOSIS — G89.29 CHRONIC RIGHT SHOULDER PAIN: ICD-10-CM

## 2018-07-02 DIAGNOSIS — Z23 NEED FOR PROPHYLACTIC VACCINATION AND INOCULATION AGAINST VARICELLA: ICD-10-CM

## 2018-07-02 DIAGNOSIS — E11.9 TYPE 2 DIABETES MELLITUS WITHOUT COMPLICATION, WITHOUT LONG-TERM CURRENT USE OF INSULIN (HCC): Primary | ICD-10-CM

## 2018-07-02 PROCEDURE — 3045F PR MOST RECENT HEMOGLOBIN A1C LEVEL 7.0-9.0%: CPT | Performed by: STUDENT IN AN ORGANIZED HEALTH CARE EDUCATION/TRAINING PROGRAM

## 2018-07-02 PROCEDURE — 99213 OFFICE O/P EST LOW 20 MIN: CPT | Performed by: STUDENT IN AN ORGANIZED HEALTH CARE EDUCATION/TRAINING PROGRAM

## 2018-07-02 PROCEDURE — 1036F TOBACCO NON-USER: CPT | Performed by: STUDENT IN AN ORGANIZED HEALTH CARE EDUCATION/TRAINING PROGRAM

## 2018-07-02 PROCEDURE — G8417 CALC BMI ABV UP PARAM F/U: HCPCS | Performed by: STUDENT IN AN ORGANIZED HEALTH CARE EDUCATION/TRAINING PROGRAM

## 2018-07-02 PROCEDURE — 3017F COLORECTAL CA SCREEN DOC REV: CPT | Performed by: STUDENT IN AN ORGANIZED HEALTH CARE EDUCATION/TRAINING PROGRAM

## 2018-07-02 PROCEDURE — 2022F DILAT RTA XM EVC RTNOPTHY: CPT | Performed by: STUDENT IN AN ORGANIZED HEALTH CARE EDUCATION/TRAINING PROGRAM

## 2018-07-02 PROCEDURE — G8427 DOCREV CUR MEDS BY ELIG CLIN: HCPCS | Performed by: STUDENT IN AN ORGANIZED HEALTH CARE EDUCATION/TRAINING PROGRAM

## 2018-07-02 ASSESSMENT — PATIENT HEALTH QUESTIONNAIRE - PHQ9
SUM OF ALL RESPONSES TO PHQ9 QUESTIONS 1 & 2: 0
SUM OF ALL RESPONSES TO PHQ QUESTIONS 1-9: 0
2. FEELING DOWN, DEPRESSED OR HOPELESS: 0
1. LITTLE INTEREST OR PLEASURE IN DOING THINGS: 0

## 2018-07-02 ASSESSMENT — ENCOUNTER SYMPTOMS
SHORTNESS OF BREATH: 0
COUGH: 0

## 2018-07-02 NOTE — PATIENT INSTRUCTIONS
Thank you for letting us take care of you today. We hope all your questions were addressed. If a question was overlooked or something else comes to mind after you return home, please contact a member of your Care Team listed below. Please make sure you have a routine office visit set up to follow-up on [address]. Your Care Team at [place] [address] is Team #1  Pauly Linares MD (Faculty)  Madelaine Galdamez MD (Faculty  Celylam Riojas MD (Resident)  Randi Barreto MD (Resident)  Marino Rey MD (Resident)  Rylee Manzo MD (Resident)  Rodriguez Smith MD (Resident)  Terra Solorzano LEAH ACEVEDO, [facility], ([address])  SHIVA Johnson, ([address] [facility])  Tiffany North, Ph.D., (Behavioral Services)  Kaur Kaur [facility] (Clinical Pharmacist)     Office phone number: [phone]    If you need to get in right away due to illness, please be advised we have \"Same Day\" appointments available Monday-Friday. Please call us at [phone] option #3 to schedule your \"Same Day\" appointment.

## 2018-07-03 NOTE — PROGRESS NOTES
I have reviewed and discussed key elements of [address] [place] [address] with the resident including plan of care and follow up and agree with the care bina plan.
monitoring  02/24/2019    Creatinine monitoring  02/24/2019    A1C test (Diabetic or Prediabetic)  05/17/2019    Colon cancer screen colonoscopy  08/25/2024    DTaP/Tdap/Td vaccine (2 - Td) 06/16/2027    Pneumococcal med risk  Completed    Hepatitis C screen  Addressed    HIV screen  Addressed

## 2018-07-12 ENCOUNTER — HOSPITAL ENCOUNTER (OUTPATIENT)
Dept: PHYSICAL THERAPY | Age: 58
Setting detail: THERAPIES SERIES
Discharge: HOME | End: 2018-07-12
Payer: COMMERCIAL

## 2018-07-12 PROCEDURE — 97110 THERAPEUTIC EXERCISES: CPT

## 2018-07-12 PROCEDURE — 97161 PT EVAL LOW COMPLEX 20 MIN: CPT

## 2018-07-12 NOTE — CONSULTS
[x] [facility]        Outpatient Physical                Therapy       [address]       Phone: [phone]       Fax: [phone] [] [facility]       Promotion at [address] [facility]       Phone: [phone]       Fax: [phone] [] Sami Chatman [facility]      for Health Promotion     [address] [facility]      Phone: [phone]      Fax:  [phone]     Physical Therapy Upper Extremity Evaluation    Date:  2018  Patient: Audrey Negrete  : 1960  MRN: [id number]  Juli Arango MD,  Robert Lui MD  Insurance: [employer] (30)  Medical Diagnosis: Chronic R shoulder pain M25.511, G89.29  Rehab Codes: M25.511, R29.3, M26.511  Onset Date: 18   Next 's appt:     Subjective:   CC: Pn in R shoulder  HPI: Had a fall about 3 mo ago, bringing dog in house and fell off onto ground after having several drinks. Landed directly on R shoulder and face. Went to ER next day, x-rays show no acute fx. Reports difficulties with sleeping, overhead reaching, reaching away from body. Describes pn as a sharp pn in global shoulder. Will get some relief from ibuprofen. Had sx in L elbow about 5 mo ago and removed loose bodies, still having some issues with it. Reports swimming several times a week but has had difficutly with it. Unable to shoot a basketball.      PMHx: [] Unremarkable [x] Diabetes [x] HTN  [] Pacemaker   [x] MI/Heart Problems - aortic aneurysm repair [] Cancer [] Arthritis [] Other:              [x] Refer to full medical chart  In EPIC   Tests: [x] X-Ray: [] MRI:  [] Other:    Medications: [x] Refer to full medical record [] None [] Other:  Allergies:      [] Refer to full medical record [x] None [] Other:    Function:  Hand Dominance  [x] Right  [] Left  Working:  [x] Normal Duty  [] Light Duty  [] Off D/T Condition  [] Retired    [] Not Employed    []  Disability  [] Other:             Return to work:   Trice Butler tear is and prognosis, issued HEP of exerises to correct posture, strengthen scapular stabilizers, and shoulder isometrics. [x] Verbalizes understanding. [x] Demonstrates understanding. [] Needs Review. [] Demonstrates/verbalizes understanding of HEP/Ed previously given. 7/12 HEP issued in written form    Treatment Plan:  [x] Therapeutic Exercise  [x] Modalities:  [x] Dry Needling  [x] Therapeutic Activity  [x] Ultrasound  [x] Electrical Stimulation  [] Gait Training   [x] Massage       [] Lumbar/Cervical Traction  [] Neuromuscular Re-education [x] Cold/hotpack [x] Iontophoresis: 4 mg/mL  [x] Instruction in HEP             Dexamethasone Sodium  [x] Manual Therapy             Phosphate 40-80 mAmin  [] Aquatic Therapy                   [x] Vasocompression/    [] Other:            Game Ready   [x]  Medication allergies reviewed for use of    Dexamethasone Sodium Phosphate 4mg/ml     with iontophoresis treatments. Pt is not allergic.        Frequency: 2 x/week for 14 visits       Todays Treatment:  Modalities:   Precautions:  Exercises:  Exercise Reps/ Time Weight/ Level Comments   Supine      Chin tucks 10x  Achieves better in supine than sitting         Sitting      scap retractions 15x     Post shoulder rolls 15x           Standing      Shoulder flex iso 10x 3\" hold In doorframe   Shoulder abd iso 10x 3\" hold Against wall               Other:     Specific Instructions for next treatment: Add pulls to correct posture and scapulars stabilizers (rows, prone I's, T's, Y's), progress shoulder flex/abd as tolerated to sidelying flex and scaption, prone/supine abd, add IR/ER progressing to higher degrees of abd (20, 50, 90), serratus punch in supine       Evaluation Complexity:  History (Personal factors, comorbidities) [] 0 [x] 1-2 [] 3+   Exam (limitations, restrictions) [] 1-2 [x] 3 [] 4+   Clinical presentation (progression) [x] Stable [] Evolving  [] Unstable   Decision Making [x] Low [] Moderate [] High [x] Low Complexity [] Moderate Complexity [] High Complexity       Treatment Charges: Mins Units   [x] Evaluation       [x]  Low       []  Moderate       []  High 41 1   []  Modalities     [x]  Ther Exercise 12 1   []  Manual Therapy     []  Ther Activities     []  Aquatics     []  Vasocompression     []  Other       TOTAL TREATMENT TIME: 53    Time in: 9851 Time Out: 1100    Electronically signed by: Phani Banuelos Patient evaluated and treated, and note written by Phani Banuelos, Student PT under direct supervision of Belle Bland PT. Physician Signature:________________________________Date:__________________  By signing above or cosigning this note, I have reviewed this plan of care and certify a need for medically necessary rehabilitation services.      *PLEASE SIGN ABOVE AND FAX BACK ALL PAGES*

## 2018-07-16 RX ORDER — CLOPIDOGREL BISULFATE 75 MG/1
TABLET ORAL
Qty: 30 TABLET | Refills: 0 | Status: SHIPPED | OUTPATIENT
Start: 2018-07-16 | End: 2018-08-15 | Stop reason: SDUPTHER

## 2018-07-16 RX ORDER — SIMVASTATIN 20 MG
TABLET ORAL
Qty: 30 TABLET | Refills: 0 | Status: SHIPPED | OUTPATIENT
Start: 2018-07-16 | End: 2018-08-15 | Stop reason: SDUPTHER

## 2018-07-16 NOTE — TELEPHONE ENCOUNTER
Medication is on med list please review and address    Please address the medication refill and close the encounter. If I can be of assistance, please route to the applicable pool. Thank you. Next Visit Date:  Future Appointments  Date Time Provider Cuca Daviesi   7/17/2018 8:30 AM Ga Burk, PTA [facility]   7/19/2018 10:15 AM Ga Clarks, PTA [facility]       Health Maintenance   Topic Date Due    Shingles Vaccine (1 of 2 - 2 Dose Series) 04/27/2012    Diabetic retinal exam  03/30/2016    Lipid screen  02/11/2017    Flu vaccine (1) 11/09/2018 (Originally 9/1/2018)    Diabetic foot exam  09/18/2018    Diabetic microalbuminuria test  09/18/2018    Potassium monitoring  02/24/2019    Creatinine monitoring  02/24/2019    A1C test (Diabetic or Prediabetic)  05/17/2019    Colon cancer screen colonoscopy  08/25/2024    DTaP/Tdap/Td vaccine (2 - Td) 06/16/2027    Pneumococcal med risk  Completed    Hepatitis C screen  Addressed    HIV screen  Addressed       Hemoglobin A1C (%)   Date Value   05/17/2018 8.2   09/28/2017 8   09/18/2017 9.4             ( goal A1C is < 7)   Microalb/Crt. Ratio (mcg/mg creat)   Date Value   02/11/2016 274     LDL Cholesterol (mg/dL)   Date Value   02/11/2016 89     LDL Calculated (mg/dL)   Date Value   07/24/2013 120       (goal LDL is <100)   AST (U/L)   Date Value   02/11/2016 19     ALT (U/L)   Date Value   02/11/2016 11     BUN (mg/dL)   Date Value   02/24/2018 12     BP Readings from Last 3 Encounters:   07/02/18 126/82   05/17/18 (!) 156/88   04/23/18 (!) 183/98          (goal 120/80)    All Future Testing planned in [facility]  Lab Frequency Next Occurrence   Lipid, Fasting Once 08/08/2018   Lipid, Fasting Once 08/01/2018               Patient Active Problem List:     HLD (hyperlipidemia)     HTN (hypertension)     DM (diabetes mellitus) ([address]     Chronic back pain     Thoracic aortic aneurysm diagnosed  2010?  at [facility]

## 2018-08-01 NOTE — TELEPHONE ENCOUNTER
Medication is on med list please review and address    Please address the medication refill and close the encounter. If I can be of assistance, please route to the applicable pool. Thank you. Next Visit Date:  No future appointments. Health Maintenance   Topic Date Due    Shingles Vaccine (1 of 2 - 2 Dose Series) 04/27/2012    Diabetic retinal exam  03/30/2016    Lipid screen  02/11/2017    Flu vaccine (1) 11/09/2018 (Originally 9/1/2018)    Diabetic foot exam  09/18/2018    Diabetic microalbuminuria test  09/18/2018    Potassium monitoring  02/24/2019    Creatinine monitoring  02/24/2019    A1C test (Diabetic or Prediabetic)  05/17/2019    Colon cancer screen colonoscopy  08/25/2024    DTaP/Tdap/Td vaccine (2 - Td) 06/16/2027    Pneumococcal med risk  Completed    Hepatitis C screen  Addressed    HIV screen  Addressed       Hemoglobin A1C (%)   Date Value   05/17/2018 8.2   09/28/2017 8   09/18/2017 9.4             ( goal A1C is < 7)   Microalb/Crt. Ratio (mcg/mg creat)   Date Value   02/11/2016 274     LDL Cholesterol (mg/dL)   Date Value   02/11/2016 89     LDL Calculated (mg/dL)   Date Value   07/24/2013 120       (goal LDL is <100)   AST (U/L)   Date Value   02/11/2016 19     ALT (U/L)   Date Value   02/11/2016 11     BUN (mg/dL)   Date Value   02/24/2018 12     BP Readings from Last 3 Encounters:   07/02/18 126/82   05/17/18 (!) 156/88   04/23/18 (!) 183/98          (goal 120/80)    All Future Testing planned in [facility]  Lab Frequency Next Occurrence   Lipid, Fasting Once 08/08/2018   Lipid, Fasting Once 08/01/2018               Patient Active Problem List:     HLD (hyperlipidemia)     HTN (hypertension)     DM (diabetes mellitus) ([address]     Chronic back pain     Thoracic aortic aneurysm diagnosed  2010?  at [facility]

## 2018-08-08 RX ORDER — LISINOPRIL 40 MG/1
TABLET ORAL
Qty: 30 TABLET | Refills: 3 | Status: SHIPPED | OUTPATIENT
Start: 2018-08-08 | End: 2018-12-06 | Stop reason: SDUPTHER

## 2018-08-15 NOTE — TELEPHONE ENCOUNTER
Last visit:07/02/2018  Last Med refill:07/16/2018    Next Visit Date:  No future appointments. Health Maintenance   Topic Date Due    Shingles Vaccine (1 of 2 - 2 Dose Series) 04/27/2012    Diabetic retinal exam  03/30/2016    Lipid screen  02/11/2017    Flu vaccine (1) 11/09/2018 (Originally 9/1/2018)    Diabetic foot exam  09/18/2018    Diabetic microalbuminuria test  09/18/2018    Potassium monitoring  02/24/2019    Creatinine monitoring  02/24/2019    A1C test (Diabetic or Prediabetic)  05/17/2019    Colon cancer screen colonoscopy  08/25/2024    DTaP/Tdap/Td vaccine (2 - Td) 06/16/2027    Pneumococcal med risk  Completed    Hepatitis C screen  Addressed    HIV screen  Addressed       Hemoglobin A1C (%)   Date Value   05/17/2018 8.2   09/28/2017 8   09/18/2017 9.4             ( goal A1C is < 7)   Microalb/Crt. Ratio (mcg/mg creat)   Date Value   02/11/2016 274     LDL Cholesterol (mg/dL)   Date Value   02/11/2016 89     LDL Calculated (mg/dL)   Date Value   07/24/2013 120       (goal LDL is <100)   AST (U/L)   Date Value   02/11/2016 19     ALT (U/L)   Date Value   02/11/2016 11     BUN (mg/dL)   Date Value   02/24/2018 12     BP Readings from Last 3 Encounters:   07/02/18 126/82   05/17/18 (!) 156/88   04/23/18 (!) 183/98          (goal 120/80)    All Future Testing planned in [facility]  Lab Frequency Next Occurrence   Lipid, Fasting Once 08/08/2018   Lipid, Fasting Once 01/02/2019               Patient Active Problem List:     HLD (hyperlipidemia)     HTN (hypertension)     DM (diabetes mellitus) ([facility] [address]     Chronic back pain     Thoracic aortic aneurysm diagnosed  2010? at [facility]   Please address the medication refill and close the encounter. If I can be of assistance, please route to the applicable pool. Thank you.

## 2018-08-16 RX ORDER — SIMVASTATIN 20 MG
TABLET ORAL
Qty: 30 TABLET | Refills: 0 | Status: SHIPPED | OUTPATIENT
Start: 2018-08-16 | End: 2018-09-17 | Stop reason: SDUPTHER

## 2018-08-16 RX ORDER — CLOPIDOGREL BISULFATE 75 MG/1
TABLET ORAL
Qty: 30 TABLET | Refills: 0 | Status: SHIPPED | OUTPATIENT
Start: 2018-08-16 | End: 2018-09-17 | Stop reason: SDUPTHER

## 2018-08-29 DIAGNOSIS — I10 ESSENTIAL HYPERTENSION: ICD-10-CM

## 2018-08-30 RX ORDER — HYDROCHLOROTHIAZIDE 25 MG/1
TABLET ORAL
Qty: 30 TABLET | Refills: 3 | Status: SHIPPED | OUTPATIENT
Start: 2018-08-30 | End: 2018-12-20 | Stop reason: SDUPTHER

## 2018-09-18 RX ORDER — SIMVASTATIN 20 MG
TABLET ORAL
Qty: 30 TABLET | Refills: 0 | Status: SHIPPED | OUTPATIENT
Start: 2018-09-18 | End: 2018-10-15 | Stop reason: SDUPTHER

## 2018-09-18 RX ORDER — CLOPIDOGREL BISULFATE 75 MG/1
TABLET ORAL
Qty: 30 TABLET | Refills: 0 | Status: SHIPPED | OUTPATIENT
Start: 2018-09-18 | End: 2018-10-15 | Stop reason: SDUPTHER

## 2018-10-16 DIAGNOSIS — I10 ESSENTIAL HYPERTENSION: ICD-10-CM

## 2018-10-16 RX ORDER — CLOPIDOGREL BISULFATE 75 MG/1
TABLET ORAL
Qty: 30 TABLET | Refills: 0 | Status: SHIPPED | OUTPATIENT
Start: 2018-10-16 | End: 2018-11-14 | Stop reason: SDUPTHER

## 2018-10-16 RX ORDER — SIMVASTATIN 20 MG
TABLET ORAL
Qty: 30 TABLET | Refills: 0 | Status: SHIPPED | OUTPATIENT
Start: 2018-10-16 | End: 2018-11-14 | Stop reason: SDUPTHER

## 2018-10-17 NOTE — TELEPHONE ENCOUNTER
Last visit:7/20/2018  Last Med refill:9/24/2018    Next Visit Date:  No future appointments. Health Maintenance   Topic Date Due    Shingles Vaccine (1 of 2 - 2 Dose Series) 04/27/2012    Diabetic retinal exam  03/30/2016    Lipid screen  02/11/2017    Diabetic foot exam  09/18/2018    Diabetic microalbuminuria test  09/18/2018    Flu vaccine (1) 11/09/2018 (Originally 9/1/2018)    Potassium monitoring  02/24/2019    Creatinine monitoring  02/24/2019    A1C test (Diabetic or Prediabetic)  05/17/2019    Colon cancer screen colonoscopy  08/25/2024    DTaP/Tdap/Td vaccine (2 - Td) 06/16/2027    Pneumococcal med risk  Completed    Hepatitis C screen  Addressed    HIV screen  Addressed       Hemoglobin A1C (%)   Date Value   05/17/2018 8.2   09/28/2017 8   09/18/2017 9.4             ( goal A1C is < 7)   Microalb/Crt. Ratio (mcg/mg creat)   Date Value   02/11/2016 274     LDL Cholesterol (mg/dL)   Date Value   02/11/2016 89     LDL Calculated (mg/dL)   Date Value   07/24/2013 120       (goal LDL is <100)   AST (U/L)   Date Value   02/11/2016 19     ALT (U/L)   Date Value   02/11/2016 11     BUN (mg/dL)   Date Value   02/24/2018 12     BP Readings from Last 3 Encounters:   07/02/18 126/82   05/17/18 (!) 156/88   04/23/18 (!) 183/98          (goal 120/80)    All Future Testing planned in [facility]  Lab Frequency Next Occurrence   Lipid, Fasting Once 11/17/2018   Lipid, Fasting Once 01/02/2019               Patient Active Problem List:     HLD (hyperlipidemia)     HTN (hypertension)     DM (diabetes mellitus) ([facility] [address]     Chronic back pain     Thoracic aortic aneurysm diagnosed  2010? at [facility]       Please address the medication refill and close the encounter. If I can be of assistance, please route to the applicable pool. Thank you.

## 2018-10-18 RX ORDER — AMLODIPINE BESYLATE 10 MG/1
TABLET ORAL
Qty: 30 TABLET | Refills: 5 | Status: SHIPPED | OUTPATIENT
Start: 2018-10-18 | End: 2019-04-12 | Stop reason: SDUPTHER

## 2018-11-08 ENCOUNTER — OFFICE VISIT (OUTPATIENT)
Dept: FAMILY MEDICINE CLINIC | Age: 58
End: 2018-11-08
Payer: COMMERCIAL

## 2018-11-08 ENCOUNTER — HOSPITAL ENCOUNTER (OUTPATIENT)
Age: 58
Discharge: HOME | End: 2018-11-10
Payer: COMMERCIAL

## 2018-11-08 ENCOUNTER — HOSPITAL ENCOUNTER (OUTPATIENT)
Dept: GENERAL RADIOLOGY | Age: 58
Discharge: HOME | End: 2018-11-10
Payer: COMMERCIAL

## 2018-11-08 VITALS
SYSTOLIC BLOOD PRESSURE: 136 MMHG | DIASTOLIC BLOOD PRESSURE: 85 MMHG | TEMPERATURE: 97.6 F | HEIGHT: 68 IN | BODY MASS INDEX: 29.7 KG/M2 | HEART RATE: 66 BPM | WEIGHT: 196 LBS

## 2018-11-08 DIAGNOSIS — Z79.1 NSAID LONG-TERM USE: ICD-10-CM

## 2018-11-08 DIAGNOSIS — M25.511 ACUTE PAIN OF RIGHT SHOULDER: Primary | ICD-10-CM

## 2018-11-08 DIAGNOSIS — M25.511 ACUTE PAIN OF RIGHT SHOULDER: ICD-10-CM

## 2018-11-08 PROCEDURE — 73030 X-RAY EXAM OF SHOULDER: CPT

## 2018-11-08 PROCEDURE — G8417 CALC BMI ABV UP PARAM F/U: HCPCS | Performed by: STUDENT IN AN ORGANIZED HEALTH CARE EDUCATION/TRAINING PROGRAM

## 2018-11-08 PROCEDURE — 99213 OFFICE O/P EST LOW 20 MIN: CPT | Performed by: STUDENT IN AN ORGANIZED HEALTH CARE EDUCATION/TRAINING PROGRAM

## 2018-11-08 PROCEDURE — G8427 DOCREV CUR MEDS BY ELIG CLIN: HCPCS | Performed by: STUDENT IN AN ORGANIZED HEALTH CARE EDUCATION/TRAINING PROGRAM

## 2018-11-08 PROCEDURE — G8484 FLU IMMUNIZE NO ADMIN: HCPCS | Performed by: STUDENT IN AN ORGANIZED HEALTH CARE EDUCATION/TRAINING PROGRAM

## 2018-11-08 PROCEDURE — 3017F COLORECTAL CA SCREEN DOC REV: CPT | Performed by: STUDENT IN AN ORGANIZED HEALTH CARE EDUCATION/TRAINING PROGRAM

## 2018-11-08 PROCEDURE — 1036F TOBACCO NON-USER: CPT | Performed by: STUDENT IN AN ORGANIZED HEALTH CARE EDUCATION/TRAINING PROGRAM

## 2018-11-08 RX ORDER — ACETAMINOPHEN 500 MG
1000 TABLET ORAL EVERY 6 HOURS PRN
Qty: 120 TABLET | Refills: 3 | Status: SHIPPED | OUTPATIENT
Start: 2018-11-08 | End: 2022-05-12 | Stop reason: SDUPTHER

## 2018-11-08 RX ORDER — PANTOPRAZOLE SODIUM 20 MG/1
20 TABLET, DELAYED RELEASE ORAL DAILY
Qty: 30 TABLET | Refills: 1 | Status: SHIPPED | OUTPATIENT
Start: 2018-11-08 | End: 2018-12-27 | Stop reason: SDUPTHER

## 2018-11-08 RX ORDER — IBUPROFEN 800 MG/1
800 TABLET ORAL EVERY 6 HOURS PRN
Qty: 120 TABLET | Refills: 3 | Status: SHIPPED | OUTPATIENT
Start: 2018-11-08 | End: 2019-10-14 | Stop reason: SDUPTHER

## 2018-11-08 RX ORDER — CYCLOBENZAPRINE HCL 10 MG
10 TABLET ORAL 3 TIMES DAILY PRN
Qty: 30 TABLET | Refills: 0 | Status: SHIPPED | OUTPATIENT
Start: 2018-11-08 | End: 2018-11-18

## 2018-11-08 ASSESSMENT — ENCOUNTER SYMPTOMS
COUGH: 0
SHORTNESS OF BREATH: 0

## 2018-11-13 ENCOUNTER — HOSPITAL ENCOUNTER (OUTPATIENT)
Dept: PHYSICAL THERAPY | Age: 58
Setting detail: THERAPIES SERIES
Discharge: HOME | End: 2018-11-13
Payer: COMMERCIAL

## 2018-11-15 RX ORDER — SIMVASTATIN 20 MG
TABLET ORAL
Qty: 30 TABLET | Refills: 0 | Status: SHIPPED | OUTPATIENT
Start: 2018-11-15 | End: 2018-12-17 | Stop reason: SDUPTHER

## 2018-11-15 RX ORDER — CLOPIDOGREL BISULFATE 75 MG/1
TABLET ORAL
Qty: 30 TABLET | Refills: 0 | Status: SHIPPED | OUTPATIENT
Start: 2018-11-15 | End: 2018-12-17 | Stop reason: SDUPTHER

## 2018-11-26 RX ORDER — METOPROLOL TARTRATE 100 MG/1
TABLET ORAL
Qty: 60 TABLET | Refills: 3 | Status: SHIPPED | OUTPATIENT
Start: 2018-11-26 | End: 2018-11-30

## 2018-11-30 ENCOUNTER — TELEPHONE (OUTPATIENT)
Dept: PHARMACY | Age: 58
End: 2018-11-30

## 2018-11-30 ENCOUNTER — OFFICE VISIT (OUTPATIENT)
Dept: FAMILY MEDICINE CLINIC | Age: 58
End: 2018-11-30
Payer: COMMERCIAL

## 2018-11-30 VITALS — HEIGHT: 68 IN | BODY MASS INDEX: 28.98 KG/M2 | WEIGHT: 191.2 LBS

## 2018-11-30 DIAGNOSIS — N52.8 OTHER MALE ERECTILE DYSFUNCTION: ICD-10-CM

## 2018-11-30 DIAGNOSIS — M25.511 ACUTE PAIN OF RIGHT SHOULDER: Primary | ICD-10-CM

## 2018-11-30 PROCEDURE — 3017F COLORECTAL CA SCREEN DOC REV: CPT | Performed by: STUDENT IN AN ORGANIZED HEALTH CARE EDUCATION/TRAINING PROGRAM

## 2018-11-30 PROCEDURE — 99213 OFFICE O/P EST LOW 20 MIN: CPT | Performed by: STUDENT IN AN ORGANIZED HEALTH CARE EDUCATION/TRAINING PROGRAM

## 2018-11-30 PROCEDURE — G8417 CALC BMI ABV UP PARAM F/U: HCPCS | Performed by: STUDENT IN AN ORGANIZED HEALTH CARE EDUCATION/TRAINING PROGRAM

## 2018-11-30 PROCEDURE — 1036F TOBACCO NON-USER: CPT | Performed by: STUDENT IN AN ORGANIZED HEALTH CARE EDUCATION/TRAINING PROGRAM

## 2018-11-30 PROCEDURE — G8427 DOCREV CUR MEDS BY ELIG CLIN: HCPCS | Performed by: STUDENT IN AN ORGANIZED HEALTH CARE EDUCATION/TRAINING PROGRAM

## 2018-11-30 PROCEDURE — G8484 FLU IMMUNIZE NO ADMIN: HCPCS | Performed by: STUDENT IN AN ORGANIZED HEALTH CARE EDUCATION/TRAINING PROGRAM

## 2018-11-30 RX ORDER — SILDENAFIL 50 MG/1
50 TABLET, FILM COATED ORAL PRN
Qty: 5 TABLET | Refills: 1 | Status: SHIPPED | OUTPATIENT
Start: 2018-11-30 | End: 2021-07-26

## 2018-11-30 ASSESSMENT — ENCOUNTER SYMPTOMS
NAUSEA: 0
CONSTIPATION: 0
ABDOMINAL PAIN: 0
SHORTNESS OF BREATH: 0
DIARRHEA: 0
COUGH: 0

## 2018-12-07 RX ORDER — LISINOPRIL 40 MG/1
TABLET ORAL
Qty: 30 TABLET | Refills: 3 | Status: SHIPPED | OUTPATIENT
Start: 2018-12-07 | End: 2019-04-04 | Stop reason: SDUPTHER

## 2018-12-18 RX ORDER — CLOPIDOGREL BISULFATE 75 MG/1
TABLET ORAL
Qty: 30 TABLET | Refills: 0 | Status: SHIPPED | OUTPATIENT
Start: 2018-12-18 | End: 2019-01-17 | Stop reason: SDUPTHER

## 2018-12-18 RX ORDER — SIMVASTATIN 20 MG
TABLET ORAL
Qty: 30 TABLET | Refills: 0 | Status: SHIPPED | OUTPATIENT
Start: 2018-12-18 | End: 2019-01-17 | Stop reason: SDUPTHER

## 2018-12-20 DIAGNOSIS — I10 ESSENTIAL HYPERTENSION: ICD-10-CM

## 2018-12-21 RX ORDER — HYDROCHLOROTHIAZIDE 25 MG/1
TABLET ORAL
Qty: 30 TABLET | Refills: 3 | Status: SHIPPED | OUTPATIENT
Start: 2018-12-21 | End: 2019-04-12 | Stop reason: SDUPTHER

## 2018-12-27 DIAGNOSIS — Z79.1 NSAID LONG-TERM USE: ICD-10-CM

## 2018-12-29 RX ORDER — PANTOPRAZOLE SODIUM 20 MG/1
TABLET, DELAYED RELEASE ORAL
Qty: 30 TABLET | Refills: 1 | Status: SHIPPED | OUTPATIENT
Start: 2018-12-29 | End: 2021-08-26

## 2019-01-17 RX ORDER — CLOPIDOGREL BISULFATE 75 MG/1
TABLET ORAL
Qty: 30 TABLET | Refills: 0 | Status: SHIPPED | OUTPATIENT
Start: 2019-01-17 | End: 2019-02-18 | Stop reason: SDUPTHER

## 2019-01-17 RX ORDER — SIMVASTATIN 20 MG
TABLET ORAL
Qty: 30 TABLET | Refills: 0 | Status: SHIPPED | OUTPATIENT
Start: 2019-01-17 | End: 2019-02-18 | Stop reason: SDUPTHER

## 2019-02-19 RX ORDER — CLOPIDOGREL BISULFATE 75 MG/1
TABLET ORAL
Qty: 30 TABLET | Refills: 0 | Status: SHIPPED | OUTPATIENT
Start: 2019-02-19 | End: 2019-03-29 | Stop reason: SDUPTHER

## 2019-02-19 RX ORDER — SIMVASTATIN 20 MG
TABLET ORAL
Qty: 30 TABLET | Refills: 0 | Status: SHIPPED | OUTPATIENT
Start: 2019-02-19 | End: 2019-04-01

## 2019-03-16 RX ORDER — METOPROLOL TARTRATE 100 MG/1
TABLET ORAL
Qty: 60 TABLET | Refills: 3 | Status: SHIPPED | OUTPATIENT
Start: 2019-03-16 | End: 2019-11-12 | Stop reason: SDUPTHER

## 2019-04-01 ENCOUNTER — OFFICE VISIT (OUTPATIENT)
Dept: FAMILY MEDICINE CLINIC | Age: 59
End: 2019-04-01
Payer: COMMERCIAL

## 2019-04-01 VITALS
WEIGHT: 196.8 LBS | BODY MASS INDEX: 29.83 KG/M2 | SYSTOLIC BLOOD PRESSURE: 143 MMHG | HEIGHT: 68 IN | HEART RATE: 67 BPM | DIASTOLIC BLOOD PRESSURE: 86 MMHG | TEMPERATURE: 97.2 F

## 2019-04-01 DIAGNOSIS — M25.511 CHRONIC RIGHT SHOULDER PAIN: Primary | ICD-10-CM

## 2019-04-01 DIAGNOSIS — G89.29 CHRONIC RIGHT SHOULDER PAIN: Primary | ICD-10-CM

## 2019-04-01 DIAGNOSIS — Z86.73 HISTORY OF STROKE: ICD-10-CM

## 2019-04-01 PROCEDURE — G8417 CALC BMI ABV UP PARAM F/U: HCPCS | Performed by: STUDENT IN AN ORGANIZED HEALTH CARE EDUCATION/TRAINING PROGRAM

## 2019-04-01 PROCEDURE — G8427 DOCREV CUR MEDS BY ELIG CLIN: HCPCS | Performed by: STUDENT IN AN ORGANIZED HEALTH CARE EDUCATION/TRAINING PROGRAM

## 2019-04-01 PROCEDURE — 99211 OFF/OP EST MAY X REQ PHY/QHP: CPT | Performed by: STUDENT IN AN ORGANIZED HEALTH CARE EDUCATION/TRAINING PROGRAM

## 2019-04-01 PROCEDURE — 3017F COLORECTAL CA SCREEN DOC REV: CPT | Performed by: STUDENT IN AN ORGANIZED HEALTH CARE EDUCATION/TRAINING PROGRAM

## 2019-04-01 PROCEDURE — 99213 OFFICE O/P EST LOW 20 MIN: CPT | Performed by: STUDENT IN AN ORGANIZED HEALTH CARE EDUCATION/TRAINING PROGRAM

## 2019-04-01 PROCEDURE — 1036F TOBACCO NON-USER: CPT | Performed by: STUDENT IN AN ORGANIZED HEALTH CARE EDUCATION/TRAINING PROGRAM

## 2019-04-01 RX ORDER — ATORVASTATIN CALCIUM 80 MG/1
80 TABLET, FILM COATED ORAL DAILY
Qty: 90 TABLET | Refills: 1 | Status: SHIPPED | OUTPATIENT
Start: 2019-04-01 | End: 2019-10-29 | Stop reason: SDUPTHER

## 2019-04-01 ASSESSMENT — ENCOUNTER SYMPTOMS
COUGH: 0
NAUSEA: 0
DIARRHEA: 0
ABDOMINAL PAIN: 0
WHEEZING: 0
SHORTNESS OF BREATH: 0

## 2019-04-01 ASSESSMENT — PATIENT HEALTH QUESTIONNAIRE - PHQ9
SUM OF ALL RESPONSES TO PHQ QUESTIONS 1-9: 0
1. LITTLE INTEREST OR PLEASURE IN DOING THINGS: 0
2. FEELING DOWN, DEPRESSED OR HOPELESS: 0
SUM OF ALL RESPONSES TO PHQ9 QUESTIONS 1 & 2: 0
SUM OF ALL RESPONSES TO PHQ QUESTIONS 1-9: 0

## 2019-04-01 NOTE — PATIENT INSTRUCTIONS
Thank you for letting us take care of you today. We hope all your questions were addressed. If a question was overlooked or something else comes to mind after you return home, please contact a member of your Care Team listed below. Please make sure you have a routine office visit set up to follow-up on [address]. Your Care Team at [place] [address] is Team #3  Odette Bennett MD (Faculty)  Brandon Milian MD (Faculty  Gephilip Cosme MD (Resident)  Anika Phillips MD (Resident)   Ralph Gold MD (Resident)  Luci Rae MD (Resident)  Tamika Solares MD (Resident)  Richardean Merlin, LPN Adah Ransom., KAYY Harrington., [facility] office)  [facility] office)  Marcelina Lundborg ([address] [facility])  Isiah [place] ([address] [facility])  Medina Dorsey, Ph.D., (Behavioral Services)  Jimmy Tovarmsted, [address] [place] [address] (Clinical Pharmacist)     Office phone number: [phone]    If you need to get in right away due to illness, please be advised we have \"Same Day\" appointments available Monday-Friday. Please call us at [phone] option #3 to schedule your \"Same Day\" appointment.

## 2019-04-01 NOTE — PROGRESS NOTES
Attending Physician Statement  I  have discussed the care of Sarath Arias including pertinent history and exam findings with the resident. I agree with the assessment, plan and orders as documented by the resident. BP (!) 143/86 (Site: Right Upper Arm, Position: Sitting, Cuff Size: Medium Adult) Comment: manual  Pulse 67   Temp 97.2 °F (36.2 °C) (Oral)   Ht 5' 7.99\" (1.727 m)   Wt 196 lb 12.8 oz (89.3 kg)   BMI 29.93 kg/m²    BP Readings from Last 3 Encounters:   04/01/19 (!) 143/86   11/08/18 136/85   07/02/18 126/82     Wt Readings from Last 3 Encounters:   04/01/19 196 lb 12.8 oz (89.3 kg)   11/30/18 191 lb 3.2 oz (86.7 kg)   11/08/18 196 lb (88.9 kg)          Diagnosis Orders   1. Chronic right shoulder pain     2. History of stroke  atorvastatin (LIPITOR) 80 MG tablet       See orders.     Trial PT (previously non-compliant with request)    Recheck office visit - 6 w     Eugenio Garcia DO 4/1/2019 3:14 PM
Visit Information    Have you changed or started any medications since your last visit including any over-the-counter medicines, vitamins, or herbal medicines? no   Have you stopped taking any of your medications? Is so, why? -  no  Are you having any side effects from any of your medications? - no    Have you seen any other physician or provider since your last visit?  no   Have you had any other diagnostic tests since your last visit?  no   Have you been seen in the emergency room and/or had an admission in a hospital since we last saw you?  no   Have you had your routine dental cleaning in the past 6 months?  no     Do you have an active MyChart account? If no, what is the barrier?   No:     Patient Care Team:  Alverto Engel MD as PCP - General (Family Medicine)  Marzean Goins DO as PCP - S Attributed Provider    Medical History Review  Past Medical, Family, and Social History reviewed and does contribute to the patient presenting condition    Health Maintenance   Topic Date Due    Hepatitis B Vaccine (1 of 3 - Risk 3-dose series) 04/27/1981    Shingles Vaccine (1 of 2) 04/27/2012    Diabetic retinal exam  03/30/2016    Lipid screen  02/11/2017    Diabetic foot exam  09/18/2018    Diabetic microalbuminuria test  09/18/2018    Potassium monitoring  02/24/2019    Creatinine monitoring  02/24/2019    A1C test (Diabetic or Prediabetic)  05/17/2019    Flu vaccine (Season Ended) 09/01/2019    Colon cancer screen colonoscopy  08/25/2024    DTaP/Tdap/Td vaccine (2 - Td) 06/16/2027    Pneumococcal 0-64 years at Risk Vaccine  Completed    Hepatitis C screen  Addressed    HIV screen  Addressed
All patient questions answered. Pt voiced understanding. 5.  Patient given educational materials - see patient instructions  6. Was a self-tracking handout given in paper form or via [employer]? No  If yes, see orders or list here. PHQ Scores 4/1/2019 7/2/2018 6/16/2017   PHQ2 Score 0 0 0   PHQ9 Score 0 0 0     Interpretation of Total Score Depression Severity: 1-4 = Minimal depression, 5-9 = Mild depression, 10-14 = Moderate depression, 15-19 = Moderately severe depression, 20-27 = Severe depression  Normal    Completed Refills   Requested Prescriptions     Signed Prescriptions Disp Refills    atorvastatin (LIPITOR) 80 MG tablet 90 tablet 1     Sig: Take 1 tablet by mouth daily       Return in about 6 weeks (around 5/13/2019) for R shoulder pain.       Satya Mcnamara MD

## 2019-04-01 NOTE — TELEPHONE ENCOUNTER
Last visit:11/30/2018  Last Med refill:   Does patient have enough medication for 72 hours:     Next Visit Date:  Future Appointments   Date Time Provider Cuca Lu   4/1/2019  2:15 PM Monika Horton MD [address] [place] [address] Maintenance   Topic Date Due    Hepatitis B Vaccine (1 of 3 - Risk 3-dose series) 04/27/1981    Shingles Vaccine (1 of 2) 04/27/2012    Diabetic retinal exam  03/30/2016    Lipid screen  02/11/2017    Diabetic foot exam  09/18/2018    Diabetic microalbuminuria test  09/18/2018    Potassium monitoring  02/24/2019    Creatinine monitoring  02/24/2019    A1C test (Diabetic or Prediabetic)  05/17/2019    Flu vaccine (Season Ended) 09/01/2019    Colon cancer screen colonoscopy  08/25/2024    DTaP/Tdap/Td vaccine (2 - Td) 06/16/2027    Pneumococcal 0-64 years at Risk Vaccine  Completed    Hepatitis C screen  Addressed    HIV screen  Addressed       Hemoglobin A1C (%)   Date Value   05/17/2018 8.2   09/28/2017 8   09/18/2017 9.4             ( goal A1C is < 7)   Microalb/Crt. Ratio (mcg/mg creat)   Date Value   02/11/2016 274     LDL Cholesterol (mg/dL)   Date Value   02/11/2016 89     LDL Calculated (mg/dL)   Date Value   07/24/2013 120       (goal LDL is <100)   AST (U/L)   Date Value   02/11/2016 19     ALT (U/L)   Date Value   02/11/2016 11     BUN (mg/dL)   Date Value   02/24/2018 12     BP Readings from Last 3 Encounters:   11/08/18 136/85   07/02/18 126/82   05/17/18 (!) 156/88          (goal 120/80)    All Future Testing planned in [facility]  Lab Frequency Next Occurrence   Lipid, Fasting Once 05/17/2019   Lipid, Fasting Once 07/02/2019               Patient Active Problem List:     HLD (hyperlipidemia)     HTN (hypertension)     DM (diabetes mellitus) ([facility] [address]     Chronic back pain     Thoracic aortic aneurysm diagnosed  2010?  at [facility]     Acute pain of right shoulder     Other male erectile dysfunction

## 2019-04-04 RX ORDER — SIMVASTATIN 20 MG
TABLET ORAL
Qty: 30 TABLET | Refills: 0 | Status: SHIPPED | OUTPATIENT
Start: 2019-04-04 | End: 2021-07-26

## 2019-04-04 RX ORDER — CLOPIDOGREL BISULFATE 75 MG/1
TABLET ORAL
Qty: 30 TABLET | Refills: 0 | Status: SHIPPED | OUTPATIENT
Start: 2019-04-04 | End: 2019-05-03 | Stop reason: SDUPTHER

## 2019-04-08 NOTE — TELEPHONE ENCOUNTER
diagnosed  2010?  at [facility]     Acute pain of right shoulder     Other male erectile dysfunction

## 2019-04-10 RX ORDER — GLIPIZIDE 10 MG/1
TABLET, FILM COATED, EXTENDED RELEASE ORAL
Qty: 60 TABLET | Refills: 4 | Status: SHIPPED | OUTPATIENT
Start: 2019-04-10 | End: 2019-10-12 | Stop reason: SDUPTHER

## 2019-04-10 RX ORDER — LISINOPRIL 40 MG/1
TABLET ORAL
Qty: 30 TABLET | Refills: 3 | Status: SHIPPED | OUTPATIENT
Start: 2019-04-10 | End: 2019-08-05 | Stop reason: SDUPTHER

## 2019-04-12 DIAGNOSIS — I10 ESSENTIAL HYPERTENSION: ICD-10-CM

## 2019-04-12 RX ORDER — HYDROCHLOROTHIAZIDE 25 MG/1
TABLET ORAL
Qty: 30 TABLET | Refills: 3 | Status: SHIPPED | OUTPATIENT
Start: 2019-04-12 | End: 2019-09-25 | Stop reason: SDUPTHER

## 2019-04-12 RX ORDER — AMLODIPINE BESYLATE 10 MG/1
TABLET ORAL
Qty: 30 TABLET | Refills: 5 | Status: SHIPPED | OUTPATIENT
Start: 2019-04-12 | End: 2019-10-23 | Stop reason: SDUPTHER

## 2019-04-12 NOTE — TELEPHONE ENCOUNTER
Please address the medication refill and close the encounter. If I can be of assistance, please route to the applicable pool. Thank you. Last visit: [id number]  Last Med refill: [id number]  Does patient have enough medication for 72 hours:  Next Visit Date:  Future Appointments   Date Time Provider Cuca Leigh   5/14/2019  2:15 PM Monika Sheppard MD [address] [place] [address] Maintenance   Topic Date Due    Hepatitis B Vaccine (1 of 3 - Risk 3-dose series) 04/27/1981    Shingles Vaccine (1 of 2) 04/27/2012    Diabetic retinal exam  03/30/2016    Lipid screen  02/11/2017    Diabetic foot exam  09/18/2018    Diabetic microalbuminuria test  09/18/2018    Potassium monitoring  02/24/2019    Creatinine monitoring  02/24/2019    A1C test (Diabetic or Prediabetic)  05/17/2019    Flu vaccine (Season Ended) 09/01/2019    Colon cancer screen colonoscopy  08/25/2024    DTaP/Tdap/Td vaccine (2 - Td) 06/16/2027    Pneumococcal 0-64 years Vaccine  Completed    Hepatitis C screen  Addressed    HIV screen  Addressed       Hemoglobin A1C (%)   Date Value   05/17/2018 8.2   09/28/2017 8   09/18/2017 9.4             ( goal A1C is < 7)   Microalb/Crt. Ratio (mcg/mg creat)   Date Value   02/11/2016 274     LDL Cholesterol (mg/dL)   Date Value   02/11/2016 89     LDL Calculated (mg/dL)   Date Value   07/24/2013 120       (goal LDL is <100)   AST (U/L)   Date Value   02/11/2016 19     ALT (U/L)   Date Value   02/11/2016 11     BUN (mg/dL)   Date Value   02/24/2018 12     BP Readings from Last 3 Encounters:   04/01/19 (!) 143/86   11/08/18 136/85   07/02/18 126/82          (goal 120/80)    All Future Testing planned in [facility]  Lab Frequency Next Occurrence   Lipid, Fasting Once 05/17/2019   Lipid, Fasting Once 07/02/2019               Patient Active Problem List:     HLD (hyperlipidemia)     HTN (hypertension)     DM (diabetes mellitus) ([facility] [address]     Chronic back pain     Thoracic aortic aneurysm diagnosed  2010?  at [facility]     Acute pain of right shoulder     Other male erectile dysfunction

## 2019-05-03 NOTE — TELEPHONE ENCOUNTER
Please address the medication refill and close the encounter. If I can be of assistance, please route to the applicable pool. Thank you. Last visit: [id number]  Last Med refill: [id number]  Does patient have enough medication for 72 hours:   Next Visit Date:  Future Appointments   Date Time Provider Cuca Leigh   5/14/2019  2:15 PM Monika Rene MD [address] [place] [address] Maintenance   Topic Date Due    Hepatitis B Vaccine (1 of 3 - Risk 3-dose series) 04/27/1981    Shingles Vaccine (1 of 2) 04/27/2012    Diabetic retinal exam  03/30/2016    Lipid screen  02/11/2017    Diabetic foot exam  09/18/2018    Diabetic microalbuminuria test  09/18/2018    Potassium monitoring  02/24/2019    Creatinine monitoring  02/24/2019    A1C test (Diabetic or Prediabetic)  05/17/2019    Flu vaccine (Season Ended) 09/01/2019    Colon cancer screen colonoscopy  08/25/2024    DTaP/Tdap/Td vaccine (2 - Td) 06/16/2027    Pneumococcal 0-64 years Vaccine  Completed    Hepatitis C screen  Addressed    HIV screen  Addressed       Hemoglobin A1C (%)   Date Value   05/17/2018 8.2   09/28/2017 8   09/18/2017 9.4             ( goal A1C is < 7)   Microalb/Crt. Ratio (mcg/mg creat)   Date Value   02/11/2016 274     LDL Cholesterol (mg/dL)   Date Value   02/11/2016 89     LDL Calculated (mg/dL)   Date Value   07/24/2013 120       (goal LDL is <100)   AST (U/L)   Date Value   02/11/2016 19     ALT (U/L)   Date Value   02/11/2016 11     BUN (mg/dL)   Date Value   02/24/2018 12     BP Readings from Last 3 Encounters:   04/01/19 (!) 143/86   11/08/18 136/85   07/02/18 126/82          (goal 120/80)    All Future Testing planned in [facility]  Lab Frequency Next Occurrence   Lipid, Fasting Once 05/17/2019   Lipid, Fasting Once 07/02/2019               Patient Active Problem List:     HLD (hyperlipidemia)     HTN (hypertension)     DM (diabetes mellitus) ([address]     Chronic back pain     Thoracic aortic aneurysm diagnosed  2010?  at [facility]     Acute pain of right shoulder     Other male erectile dysfunction

## 2019-05-04 RX ORDER — CLOPIDOGREL BISULFATE 75 MG/1
TABLET ORAL
Qty: 30 TABLET | Refills: 0 | Status: SHIPPED | OUTPATIENT
Start: 2019-05-04 | End: 2019-06-03 | Stop reason: SDUPTHER

## 2019-06-03 NOTE — TELEPHONE ENCOUNTER
Health Maintenance   Topic Date Due    Hepatitis B Vaccine (1 of 3 - Risk 3-dose series) 04/27/1981    Shingles Vaccine (1 of 2) 04/27/2012    Diabetic retinal exam  03/30/2016    Lipid screen  02/11/2017    Diabetic foot exam  09/18/2018    Diabetic microalbuminuria test  09/18/2018    Potassium monitoring  02/24/2019    Creatinine monitoring  02/24/2019    A1C test (Diabetic or Prediabetic)  05/17/2019    Flu vaccine (Season Ended) 09/01/2019    Colon cancer screen colonoscopy  08/25/2024    DTaP/Tdap/Td vaccine (2 - Td) 06/16/2027    Pneumococcal 0-64 years Vaccine  Completed    Hepatitis C screen  Addressed    HIV screen  Addressed             (applicable per patient's age: Cancer Screenings, Depression Screening, Fall Risk Screening, Immunizations)    Hemoglobin A1C (%)   Date Value   05/17/2018 8.2   09/28/2017 8   09/18/2017 9.4     Microalb/Crt. Ratio (mcg/mg creat)   Date Value   02/11/2016 274     LDL Cholesterol (mg/dL)   Date Value   02/11/2016 89     LDL Calculated (mg/dL)   Date Value   07/24/2013 120     AST (U/L)   Date Value   02/11/2016 19     ALT (U/L)   Date Value   02/11/2016 11     BUN (mg/dL)   Date Value   02/24/2018 12      (goal A1C is < 7)   (goal LDL is <100) need 30-50% reduction from baseline     BP Readings from Last 3 Encounters:   04/01/19 (!) 143/86   11/08/18 136/85   07/02/18 126/82    (goal /80)      All Future Testing planned in [facility]:  Lab Frequency Next Occurrence   Lipid, Fasting Once 07/02/2019       Next Visit Date:  No future appointments. Patient Active Problem List:     HLD (hyperlipidemia)     HTN (hypertension)     DM (diabetes mellitus) ([address]     Chronic back pain     Thoracic aortic aneurysm diagnosed  2010?  at [facility]     Acute pain of right shoulder     Other male erectile dysfunction

## 2019-06-05 RX ORDER — CLOPIDOGREL BISULFATE 75 MG/1
TABLET ORAL
Qty: 30 TABLET | Refills: 0 | Status: SHIPPED | OUTPATIENT
Start: 2019-06-05 | End: 2019-07-01 | Stop reason: SDUPTHER

## 2019-07-03 RX ORDER — CLOPIDOGREL BISULFATE 75 MG/1
TABLET ORAL
Qty: 30 TABLET | Refills: 0 | Status: SHIPPED | OUTPATIENT
Start: 2019-07-03 | End: 2019-08-09 | Stop reason: SDUPTHER

## 2019-07-08 ENCOUNTER — TELEPHONE (OUTPATIENT)
Dept: FAMILY MEDICINE CLINIC | Age: 59
End: 2019-07-08

## 2019-07-08 NOTE — TELEPHONE ENCOUNTER
Patient was informed that they are due for their A1C. Scheduled patient for an MA visit on 07/09/2019 to have this completed.

## 2019-07-09 ENCOUNTER — NURSE ONLY (OUTPATIENT)
Dept: FAMILY MEDICINE CLINIC | Age: 59
End: 2019-07-09
Payer: COMMERCIAL

## 2019-07-09 DIAGNOSIS — E08.00 DIABETES MELLITUS DUE TO UNDERLYING CONDITION WITH HYPEROSMOLARITY WITHOUT COMA, WITHOUT LONG-TERM CURRENT USE OF INSULIN (HCC): Primary | ICD-10-CM

## 2019-07-09 LAB — HBA1C MFR BLD: 8.8 %

## 2019-07-09 PROCEDURE — 83036 HEMOGLOBIN GLYCOSYLATED A1C: CPT | Performed by: FAMILY MEDICINE

## 2019-07-09 NOTE — PROGRESS NOTES
Nurse visit for A1C results are 8.8. Pt states he has seen his MD in April 2019 declined a future appointment. States he will call for an appointment in the near future. Also states he has a nurse checking it at home through his insurance.

## 2019-08-07 RX ORDER — LISINOPRIL 40 MG/1
TABLET ORAL
Qty: 30 TABLET | Refills: 3 | Status: SHIPPED | OUTPATIENT
Start: 2019-08-07 | End: 2019-12-02 | Stop reason: SDUPTHER

## 2019-08-13 RX ORDER — CLOPIDOGREL BISULFATE 75 MG/1
TABLET ORAL
Qty: 30 TABLET | Refills: 0 | Status: SHIPPED | OUTPATIENT
Start: 2019-08-13 | End: 2019-09-09 | Stop reason: SDUPTHER

## 2019-09-09 NOTE — TELEPHONE ENCOUNTER
Last visit: 04/01/2019  Last Med refill:   Does patient have enough medication for 72 hours:     Next Visit Date:  No future appointments. Health Maintenance   Topic Date Due    Hepatitis B Vaccine (1 of 3 - Risk 3-dose series) 04/27/1981    Shingles Vaccine (1 of 2) 04/27/2012    Diabetic retinal exam  03/30/2016    Lipid screen  02/11/2017    Diabetic foot exam  09/18/2018    Diabetic microalbuminuria test  09/18/2018    Potassium monitoring  02/24/2019    Creatinine monitoring  02/24/2019    Flu vaccine (1) 09/01/2019    A1C test (Diabetic or Prediabetic)  07/09/2020    Colon cancer screen colonoscopy  08/25/2024    DTaP/Tdap/Td vaccine (2 - Td) 06/16/2027    Pneumococcal 0-64 years Vaccine  Completed    Hepatitis C screen  Addressed    HIV screen  Addressed       Hemoglobin A1C (%)   Date Value   07/09/2019 8.8   05/17/2018 8.2   09/28/2017 8             ( goal A1C is < 7)   Microalb/Crt. Ratio (mcg/mg creat)   Date Value   02/11/2016 274     LDL Cholesterol (mg/dL)   Date Value   02/11/2016 89     LDL Calculated (mg/dL)   Date Value   07/24/2013 120       (goal LDL is <100)   AST (U/L)   Date Value   02/11/2016 19     ALT (U/L)   Date Value   02/11/2016 11     BUN (mg/dL)   Date Value   02/24/2018 12     BP Readings from Last 3 Encounters:   04/01/19 (!) 143/86   11/08/18 136/85   07/02/18 126/82          (goal 120/80)    All Future Testing planned in [facility]  Lab Frequency Next Occurrence               Patient Active Problem List:     HLD (hyperlipidemia)     HTN (hypertension)     DM (diabetes mellitus) (HCC)     Chronic back pain     Thoracic aortic aneurysm diagnosed  2010?  at [facility]     Acute pain of right shoulder     Other male erectile dysfunction

## 2019-09-11 RX ORDER — CLOPIDOGREL BISULFATE 75 MG/1
TABLET ORAL
Qty: 30 TABLET | Refills: 0 | Status: SHIPPED | OUTPATIENT
Start: 2019-09-11 | End: 2019-10-10 | Stop reason: SDUPTHER

## 2019-09-25 DIAGNOSIS — I10 ESSENTIAL HYPERTENSION: ICD-10-CM

## 2019-09-28 RX ORDER — HYDROCHLOROTHIAZIDE 25 MG/1
TABLET ORAL
Qty: 30 TABLET | Refills: 3 | Status: SHIPPED | OUTPATIENT
Start: 2019-09-28 | End: 2020-01-26

## 2019-10-12 DIAGNOSIS — M25.511 ACUTE PAIN OF RIGHT SHOULDER: ICD-10-CM

## 2019-10-14 RX ORDER — CLOPIDOGREL BISULFATE 75 MG/1
TABLET ORAL
Qty: 30 TABLET | Refills: 0 | Status: SHIPPED | OUTPATIENT
Start: 2019-10-14 | End: 2020-05-12

## 2019-10-14 RX ORDER — CLOPIDOGREL BISULFATE 75 MG/1
TABLET ORAL
Qty: 30 TABLET | Refills: 0 | Status: SHIPPED | OUTPATIENT
Start: 2019-10-14 | End: 2019-12-09 | Stop reason: SDUPTHER

## 2019-10-14 RX ORDER — IBUPROFEN 800 MG/1
800 TABLET ORAL EVERY 6 HOURS PRN
Qty: 120 TABLET | Refills: 3 | Status: SHIPPED | OUTPATIENT
Start: 2019-10-14 | End: 2020-11-08

## 2019-10-14 RX ORDER — GLIPIZIDE 10 MG/1
TABLET, FILM COATED, EXTENDED RELEASE ORAL
Qty: 60 TABLET | Refills: 4 | Status: SHIPPED | OUTPATIENT
Start: 2019-10-14 | End: 2020-04-16

## 2019-10-21 DIAGNOSIS — Z86.73 HISTORY OF STROKE: ICD-10-CM

## 2019-10-23 DIAGNOSIS — I10 ESSENTIAL HYPERTENSION: ICD-10-CM

## 2019-10-24 RX ORDER — AMLODIPINE BESYLATE 10 MG/1
TABLET ORAL
Qty: 30 TABLET | Refills: 5 | Status: SHIPPED | OUTPATIENT
Start: 2019-10-24 | End: 2020-03-30

## 2019-10-29 RX ORDER — ATORVASTATIN CALCIUM 80 MG/1
80 TABLET, FILM COATED ORAL DAILY
Qty: 90 TABLET | Refills: 1 | Status: SHIPPED | OUTPATIENT
Start: 2019-10-29 | End: 2020-03-30

## 2019-11-15 RX ORDER — METOPROLOL TARTRATE 100 MG/1
TABLET ORAL
Qty: 60 TABLET | Refills: 3 | Status: SHIPPED | OUTPATIENT
Start: 2019-11-15 | End: 2021-01-20 | Stop reason: SDUPTHER

## 2019-11-26 RX ORDER — METOPROLOL TARTRATE 100 MG/1
TABLET ORAL
Qty: 60 TABLET | Refills: 3 | Status: SHIPPED | OUTPATIENT
Start: 2019-11-26 | End: 2020-09-17

## 2019-12-05 RX ORDER — LISINOPRIL 40 MG/1
TABLET ORAL
Qty: 30 TABLET | Refills: 3 | Status: SHIPPED | OUTPATIENT
Start: 2019-12-05 | End: 2020-03-09

## 2019-12-09 RX ORDER — CLOPIDOGREL BISULFATE 75 MG/1
TABLET ORAL
Qty: 30 TABLET | Refills: 0 | Status: SHIPPED | OUTPATIENT
Start: 2019-12-09 | End: 2020-01-17

## 2020-01-17 RX ORDER — CLOPIDOGREL BISULFATE 75 MG/1
TABLET ORAL
Qty: 30 TABLET | Refills: 0 | Status: SHIPPED | OUTPATIENT
Start: 2020-01-17 | End: 2020-05-12

## 2020-01-20 NOTE — TELEPHONE ENCOUNTER
Please address the medication refill and close the encounter. If I can be of assistance, please route to the applicable pool. Thank you. Last visit: 04/01/19  Last Med refill: 09/28/19  Does patient have enough medication for 72 hours: No:     Next Visit Date:  No future appointments. Health Maintenance   Topic Date Due    Hepatitis B vaccine (1 of 3 - Risk 3-dose series) 04/27/1981    Shingles Vaccine (1 of 2) 04/27/2012    Diabetic retinal exam  03/30/2016    Lipid screen  02/11/2017    Diabetic foot exam  09/18/2018    Diabetic microalbuminuria test  09/18/2018    Potassium monitoring  02/24/2019    Creatinine monitoring  02/24/2019    Flu vaccine (1) 09/01/2019    A1C test (Diabetic or Prediabetic)  07/09/2020    Colon cancer screen colonoscopy  08/25/2024    DTaP/Tdap/Td vaccine (2 - Td) 06/16/2027    Pneumococcal 0-64 years Vaccine  Completed    Hepatitis C screen  Addressed    HIV screen  Addressed       Hemoglobin A1C (%)   Date Value   07/09/2019 8.8   05/17/2018 8.2   09/28/2017 8             ( goal A1C is < 7)   Microalb/Crt. Ratio (mcg/mg creat)   Date Value   02/11/2016 274     LDL Cholesterol (mg/dL)   Date Value   02/11/2016 89     LDL Calculated (mg/dL)   Date Value   07/24/2013 120       (goal LDL is <100)   AST (U/L)   Date Value   02/11/2016 19     ALT (U/L)   Date Value   02/11/2016 11     BUN (mg/dL)   Date Value   02/24/2018 12     BP Readings from Last 3 Encounters:   04/01/19 (!) 143/86   11/08/18 136/85   07/02/18 126/82          (goal 120/80)    All Future Testing planned in [facility]  Lab Frequency Next Occurrence               Patient Active Problem List:     HLD (hyperlipidemia)     HTN (hypertension)     DM (diabetes mellitus) (HCC)     Chronic back pain     Thoracic aortic aneurysm diagnosed  2010?  at [facility]     Acute pain of right shoulder     Other male erectile dysfunction

## 2020-01-26 RX ORDER — HYDROCHLOROTHIAZIDE 25 MG/1
TABLET ORAL
Qty: 30 TABLET | Refills: 3 | Status: SHIPPED | OUTPATIENT
Start: 2020-01-26 | End: 2020-04-24

## 2020-01-26 RX ORDER — CLOPIDOGREL BISULFATE 75 MG/1
TABLET ORAL
Qty: 30 TABLET | Refills: 0 | Status: SHIPPED | OUTPATIENT
Start: 2020-01-26 | End: 2020-03-13

## 2020-02-28 NOTE — TELEPHONE ENCOUNTER
E-scribe request for lisinopril 40 MG. Please review and e-scribe if applicable. Next Visit Date:  Visit date not found    Health Maintenance   Topic Date Due    Hepatitis B vaccine (1 of 3 - Risk 3-dose series) 04/27/1981    Shingles Vaccine (1 of 2) 04/27/2012    Diabetic retinal exam  03/30/2016    Lipid screen  02/11/2017    Diabetic foot exam  09/18/2018    Diabetic microalbuminuria test  09/18/2018    Potassium monitoring  02/24/2019    Creatinine monitoring  02/24/2019    Flu vaccine (1) 09/01/2019    A1C test (Diabetic or Prediabetic)  07/09/2020    Colon cancer screen colonoscopy  08/25/2024    DTaP/Tdap/Td vaccine (2 - Td) 06/16/2027    Pneumococcal 0-64 years Vaccine  Completed    Hepatitis C screen  Addressed    HIV screen  Addressed    Hepatitis A vaccine  Aged Out    Hib vaccine  Aged Out    Meningococcal (ACWY) vaccine  Aged Out             (applicable per patient's age: Cancer Screenings, Depression Screening, Fall Risk Screening, Immunizations)    Hemoglobin A1C (%)   Date Value   07/09/2019 8.8   05/17/2018 8.2   09/28/2017 8     Microalb/Crt. Ratio (mcg/mg creat)   Date Value   02/11/2016 274     LDL Cholesterol (mg/dL)   Date Value   02/11/2016 89     LDL Calculated (mg/dL)   Date Value   07/24/2013 120     AST (U/L)   Date Value   02/11/2016 19     ALT (U/L)   Date Value   02/11/2016 11     BUN (mg/dL)   Date Value   02/24/2018 12      (goal A1C is < 7)   (goal LDL is <100) need 30-50% reduction from baseline     BP Readings from Last 3 Encounters:   04/01/19 (!) 143/86   11/08/18 136/85   07/02/18 126/82    (goal /80)      All Future Testing planned in [facility]:  Lab Frequency Next Occurrence       Next Visit Date:  No future appointments. Patient Active Problem List:     HLD (hyperlipidemia)     HTN (hypertension)     DM (diabetes mellitus) ([facility] [address]     Chronic back pain     Thoracic aortic aneurysm diagnosed  2010?  at [facility]     Acute pain of right shoulder     Other male erectile dysfunction

## 2020-03-09 RX ORDER — LISINOPRIL 40 MG/1
TABLET ORAL
Qty: 30 TABLET | Refills: 3 | Status: SHIPPED | OUTPATIENT
Start: 2020-03-09 | End: 2020-07-21

## 2020-03-11 NOTE — TELEPHONE ENCOUNTER
Last visit:   Last Med refill:   Does patient have enough medication for 72 hours: No:     Next Visit Date:  No future appointments. Health Maintenance   Topic Date Due    Hepatitis B vaccine (1 of 3 - Risk 3-dose series) 04/27/1981    Shingles Vaccine (1 of 2) 04/27/2012    Diabetic retinal exam  03/30/2016    Lipid screen  02/11/2017    Diabetic foot exam  09/18/2018    Diabetic microalbuminuria test  09/18/2018    Potassium monitoring  02/24/2019    Creatinine monitoring  02/24/2019    Flu vaccine (1) 09/01/2019    A1C test (Diabetic or Prediabetic)  07/09/2020    Colon cancer screen colonoscopy  08/25/2024    DTaP/Tdap/Td vaccine (2 - Td) 06/16/2027    Pneumococcal 0-64 years Vaccine  Completed    Hepatitis C screen  Addressed    HIV screen  Addressed    Hepatitis A vaccine  Aged Out    Hib vaccine  Aged Out    Meningococcal (ACWY) vaccine  Aged Out       Hemoglobin A1C (%)   Date Value   07/09/2019 8.8   05/17/2018 8.2   09/28/2017 8             ( goal A1C is < 7)   Microalb/Crt. Ratio (mcg/mg creat)   Date Value   02/11/2016 274     LDL Cholesterol (mg/dL)   Date Value   02/11/2016 89     LDL Calculated (mg/dL)   Date Value   07/24/2013 120       (goal LDL is <100)   AST (U/L)   Date Value   02/11/2016 19     ALT (U/L)   Date Value   02/11/2016 11     BUN (mg/dL)   Date Value   02/24/2018 12     BP Readings from Last 3 Encounters:   04/01/19 (!) 143/86   11/08/18 136/85   07/02/18 126/82          (goal 120/80)    All Future Testing planned in [facility]  Lab Frequency Next Occurrence               Patient Active Problem List:     HLD (hyperlipidemia)     HTN (hypertension)     DM (diabetes mellitus) (HCC)     Chronic back pain     Thoracic aortic aneurysm diagnosed  2010? at [facility]     Acute pain of right shoulder     Other male erectile dysfunction           Please address the medication refill and close the encounter.   If I can be of assistance, please route to the applicable

## 2020-03-13 RX ORDER — CLOPIDOGREL BISULFATE 75 MG/1
TABLET ORAL
Qty: 30 TABLET | Refills: 0 | Status: SHIPPED | OUTPATIENT
Start: 2020-03-13 | End: 2020-04-12

## 2020-03-30 RX ORDER — AMLODIPINE BESYLATE 10 MG/1
TABLET ORAL
Qty: 30 TABLET | Refills: 5 | Status: SHIPPED | OUTPATIENT
Start: 2020-03-30 | End: 2020-10-14

## 2020-03-30 RX ORDER — ATORVASTATIN CALCIUM 80 MG/1
80 TABLET, FILM COATED ORAL DAILY
Qty: 90 TABLET | Refills: 1 | Status: SHIPPED | OUTPATIENT
Start: 2020-03-30 | End: 2020-10-13

## 2020-03-30 NOTE — TELEPHONE ENCOUNTER
Please address the medication refill and close the encounter. If I can be of assistance, please route to the applicable pool. Thank you. Last visit: 04/01/19  Last Med refill: 10/24/19 amlodipine    10/29/19 atorvastatin  Does patient have enough medication for 72 hours: No:     Next Visit Date:  No future appointments. Health Maintenance   Topic Date Due    Hepatitis B vaccine (1 of 3 - Risk 3-dose series) 04/27/1981    Shingles Vaccine (1 of 2) 04/27/2012    Diabetic retinal exam  03/30/2016    Lipid screen  02/11/2017    Diabetic foot exam  09/18/2018    Diabetic microalbuminuria test  09/18/2018    Potassium monitoring  02/24/2019    Creatinine monitoring  02/24/2019    Flu vaccine (1) 09/01/2019    A1C test (Diabetic or Prediabetic)  07/09/2020    Colon cancer screen colonoscopy  08/25/2024    DTaP/Tdap/Td vaccine (2 - Td) 06/16/2027    Pneumococcal 0-64 years Vaccine  Completed    Hepatitis C screen  Addressed    HIV screen  Addressed    Hepatitis A vaccine  Aged Out    Hib vaccine  Aged Out    Meningococcal (ACWY) vaccine  Aged Out       Hemoglobin A1C (%)   Date Value   07/09/2019 8.8   05/17/2018 8.2   09/28/2017 8             ( goal A1C is < 7)   Microalb/Crt. Ratio (mcg/mg creat)   Date Value   02/11/2016 274     LDL Cholesterol (mg/dL)   Date Value   02/11/2016 89     LDL Calculated (mg/dL)   Date Value   07/24/2013 120       (goal LDL is <100)   AST (U/L)   Date Value   02/11/2016 19     ALT (U/L)   Date Value   02/11/2016 11     BUN (mg/dL)   Date Value   02/24/2018 12     BP Readings from Last 3 Encounters:   04/01/19 (!) 143/86   11/08/18 136/85   07/02/18 126/82          (goal 120/80)    All Future Testing planned in [facility]  Lab Frequency Next Occurrence               Patient Active Problem List:     HLD (hyperlipidemia)     HTN (hypertension)     DM (diabetes mellitus) (HCC)     Chronic back pain     Thoracic aortic aneurysm diagnosed  2010?  at [facility] Acute pain of right shoulder     Other male erectile dysfunction

## 2020-04-12 RX ORDER — CLOPIDOGREL BISULFATE 75 MG/1
TABLET ORAL
Qty: 30 TABLET | Refills: 0 | Status: SHIPPED | OUTPATIENT
Start: 2020-04-12 | End: 2020-05-12

## 2020-04-16 RX ORDER — GLIPIZIDE 10 MG/1
TABLET, FILM COATED, EXTENDED RELEASE ORAL
Qty: 60 TABLET | Refills: 4 | Status: SHIPPED | OUTPATIENT
Start: 2020-04-16 | End: 2020-10-27

## 2020-04-20 ENCOUNTER — VIRTUAL VISIT (OUTPATIENT)
Dept: FAMILY MEDICINE CLINIC | Age: 60
End: 2020-04-20
Payer: COMMERCIAL

## 2020-04-20 PROCEDURE — 99442 PR PHYS/QHP TELEPHONE EVALUATION 11-20 MIN: CPT | Performed by: STUDENT IN AN ORGANIZED HEALTH CARE EDUCATION/TRAINING PROGRAM

## 2020-04-20 ASSESSMENT — PATIENT HEALTH QUESTIONNAIRE - PHQ9
SUM OF ALL RESPONSES TO PHQ9 QUESTIONS 1 & 2: 0
2. FEELING DOWN, DEPRESSED OR HOPELESS: 0
SUM OF ALL RESPONSES TO PHQ QUESTIONS 1-9: 0
SUM OF ALL RESPONSES TO PHQ QUESTIONS 1-9: 0
1. LITTLE INTEREST OR PLEASURE IN DOING THINGS: 0

## 2020-04-20 NOTE — PROGRESS NOTES
Ubaldo Franks is a 64 y.o. male evaluated via telephone on 4/20/2020. Consent:  He and/or health care decision maker is aware that that he may receive a bill for this telephone service, depending on his insurance coverage, and has provided verbal consent to proceed: Yes      Documentation:  I communicated with the patient and/or health care decision maker about HTN. Details of this discussion including any medical advice provided:   Talked to patient via virtual phone visit in regards to essential HTN. Pt had some questions in regards to Lisinopril. Pt states he stopped taking his medication for the last week, due to his neighbour developing an allergic reaction to the medication. Pt states, she developed angioedema like symptoms and was wondering if it can cause the same with him. Detailed discussion on potential side effects of Lisinopril was carried out. Pt was also told, reaction to medication is different with each individual. He was eventually told to continue taking medication and in the event he does develop a reaction, to immediately go to the ER or call EMS/911. Pt is agreeable to plan and will start taking again. Pt does measure BP at home and SPB ranges between 120-130. Denies any acute complaints at this time. Continues to use Lisinopril, Norvasc, and Lopressor. All questions answered. Will call patient for face to face apt in 3 months. Pt agreeable to plan. I affirm this is a Patient Initiated Episode with an Established Patient who has not had a related appointment within my department in the past 7 days or scheduled within the next 24 hours.     Total Time: minutes: 11-20 minutes    Note: not billable if this call serves to triage the patient into an appointment for the relevant concern      Radha Gan

## 2020-04-20 NOTE — PROGRESS NOTES
I have reviewed and discussed key elements of [address] [place] [address] with the resident including plan of care and follow up and agree with the care bina plan.

## 2020-04-24 RX ORDER — HYDROCHLOROTHIAZIDE 25 MG/1
TABLET ORAL
Qty: 30 TABLET | Refills: 3 | Status: SHIPPED | OUTPATIENT
Start: 2020-04-24 | End: 2020-05-18

## 2020-05-11 NOTE — TELEPHONE ENCOUNTER
Next Visit Date:  No future appointments. Health Maintenance   Topic Date Due    Hepatitis B vaccine (1 of 3 - Risk 3-dose series) 04/27/1981    Shingles Vaccine (1 of 2) 04/27/2012    Diabetic retinal exam  03/30/2016    Lipid screen  02/11/2017    Diabetic foot exam  09/18/2018    Diabetic microalbuminuria test  09/18/2018    Potassium monitoring  02/24/2019    Creatinine monitoring  02/24/2019    A1C test (Diabetic or Prediabetic)  07/09/2020    Flu vaccine (Season Ended) 09/01/2020    Colon cancer screen colonoscopy  08/25/2024    DTaP/Tdap/Td vaccine (2 - Td) 06/16/2027    Pneumococcal 0-64 years Vaccine  Completed    Hepatitis C screen  Addressed    HIV screen  Addressed    Hepatitis A vaccine  Aged Out    Hib vaccine  Aged Out    Meningococcal (ACWY) vaccine  Aged Out       Hemoglobin A1C (%)   Date Value   07/09/2019 8.8   05/17/2018 8.2   09/28/2017 8             ( goal A1C is < 7)   Microalb/Crt. Ratio (mcg/mg creat)   Date Value   02/11/2016 274     LDL Cholesterol (mg/dL)   Date Value   02/11/2016 89     LDL Calculated (mg/dL)   Date Value   07/24/2013 120       (goal LDL is <100)   AST (U/L)   Date Value   02/11/2016 19     ALT (U/L)   Date Value   02/11/2016 11     BUN (mg/dL)   Date Value   02/24/2018 12     BP Readings from Last 3 Encounters:   04/01/19 (!) 143/86   11/08/18 136/85   07/02/18 126/82          (goal 120/80)    All Future Testing planned in [facility]  Lab Frequency Next Occurrence               Patient Active Problem List:     HLD (hyperlipidemia)     HTN (hypertension)     DM (diabetes mellitus) (HCC)     Chronic back pain     Thoracic aortic aneurysm diagnosed  2010?  at [facility]     Acute pain of right shoulder     Other male erectile dysfunction

## 2020-05-12 RX ORDER — CLOPIDOGREL BISULFATE 75 MG/1
TABLET ORAL
Qty: 30 TABLET | Refills: 0 | Status: SHIPPED | OUTPATIENT
Start: 2020-05-12 | End: 2020-06-08

## 2020-05-15 NOTE — TELEPHONE ENCOUNTER
Last visit: 04/20/20  Last Med refill:   Does patient have enough medication for 72 hours:     Next Visit Date:  No future appointments. Health Maintenance   Topic Date Due    Hepatitis B vaccine (1 of 3 - Risk 3-dose series) 04/27/1981    Shingles Vaccine (1 of 2) 04/27/2012    Diabetic retinal exam  03/30/2016    Lipid screen  02/11/2017    Diabetic foot exam  09/18/2018    Diabetic microalbuminuria test  09/18/2018    Potassium monitoring  02/24/2019    Creatinine monitoring  02/24/2019    A1C test (Diabetic or Prediabetic)  07/09/2020    Flu vaccine (Season Ended) 09/01/2020    Colon cancer screen colonoscopy  08/25/2024    DTaP/Tdap/Td vaccine (2 - Td) 06/16/2027    Pneumococcal 0-64 years Vaccine  Completed    Hepatitis C screen  Addressed    HIV screen  Addressed    Hepatitis A vaccine  Aged Out    Hib vaccine  Aged Out    Meningococcal (ACWY) vaccine  Aged Out       Hemoglobin A1C (%)   Date Value   07/09/2019 8.8   05/17/2018 8.2   09/28/2017 8             ( goal A1C is < 7)   Microalb/Crt. Ratio (mcg/mg creat)   Date Value   02/11/2016 274     LDL Cholesterol (mg/dL)   Date Value   02/11/2016 89     LDL Calculated (mg/dL)   Date Value   07/24/2013 120       (goal LDL is <100)   AST (U/L)   Date Value   02/11/2016 19     ALT (U/L)   Date Value   02/11/2016 11     BUN (mg/dL)   Date Value   02/24/2018 12     BP Readings from Last 3 Encounters:   04/01/19 (!) 143/86   11/08/18 136/85   07/02/18 126/82          (goal 120/80)    All Future Testing planned in [facility]  Lab Frequency Next Occurrence               Patient Active Problem List:     HLD (hyperlipidemia)     HTN (hypertension)     DM (diabetes mellitus) (HCC)     Chronic back pain     Thoracic aortic aneurysm diagnosed  2010?  at [facility]     Acute pain of right shoulder     Other male erectile dysfunction

## 2020-05-18 RX ORDER — HYDROCHLOROTHIAZIDE 25 MG/1
TABLET ORAL
Qty: 30 TABLET | Refills: 3 | Status: SHIPPED | OUTPATIENT
Start: 2020-05-18 | End: 2021-01-11 | Stop reason: SDUPTHER

## 2020-06-04 ENCOUNTER — HOSPITAL ENCOUNTER (EMERGENCY)
Age: 60
Discharge: HOME | End: 2020-06-04
Attending: EMERGENCY MEDICINE
Payer: COMMERCIAL

## 2020-06-04 VITALS
HEART RATE: 72 BPM | BODY MASS INDEX: 30.31 KG/M2 | SYSTOLIC BLOOD PRESSURE: 189 MMHG | OXYGEN SATURATION: 96 % | TEMPERATURE: 97.4 F | DIASTOLIC BLOOD PRESSURE: 96 MMHG | RESPIRATION RATE: 18 BRPM | WEIGHT: 200 LBS | HEIGHT: 68 IN

## 2020-06-04 PROCEDURE — 99282 EMERGENCY DEPT VISIT SF MDM: CPT

## 2020-06-04 RX ORDER — DIPHENHYDRAMINE HCL 25 MG
25 CAPSULE ORAL EVERY 4 HOURS PRN
Qty: 1 CAPSULE | Refills: 0 | Status: SHIPPED | OUTPATIENT
Start: 2020-06-04 | End: 2020-06-14

## 2020-06-04 ASSESSMENT — ENCOUNTER SYMPTOMS
CHEST TIGHTNESS: 0
EYE ITCHING: 1
SINUS PRESSURE: 0
NAUSEA: 0
RHINORRHEA: 0
SHORTNESS OF BREATH: 0
ABDOMINAL PAIN: 0
DIARRHEA: 0
SINUS PAIN: 0
ABDOMINAL DISTENTION: 0
APNEA: 0
VOMITING: 0

## 2020-06-04 NOTE — ED PROVIDER NOTES
Non-medical: Not on file   Tobacco Use    Smoking status: Former Smoker     Packs/day: 0.25     Years: 10.00     Pack years: 2.50     Last attempt to quit: 10/1/1998     Years since quittin.6    Smokeless tobacco: Never Used   Substance and Sexual Activity    Alcohol use: Yes     Alcohol/week: 3.0 standard drinks     Types: 3 Cans of beer per week     Comment: every weekend    Drug use: Yes     Types: Marijuana     Comment: last use 2017    Sexual activity: Yes     Partners: Female   Lifestyle    Physical activity     Days per week: Not on file     Minutes per session: Not on file    Stress: Not on file   Relationships    Social connections     Talks on phone: Not on file     Gets together: Not on file     Attends Jew service: Not on file     Active member of club or organization: Not on file     Attends meetings of clubs or organizations: Not on file     Relationship status: Not on file    Intimate partner violence     Fear of current or ex partner: Not on file     Emotionally abused: Not on file     Physically abused: Not on file     Forced sexual activity: Not on file   Other Topics Concern    Not on file   Social History Narrative    Not on file       Family History   Problem Relation Age of Onset    High Blood Pressure Mother     Diabetes Mother     Cancer Father     Heart Disease Father     Stroke Brother     Cirrhosis Brother        Allergies:  Patient has no known allergies. Home Medications:  Prior to Admission medications    Medication Sig Start Date End Date Taking?  Authorizing Provider   diphenhydrAMINE (BENADRYL) 25 MG capsule Take 1 capsule by mouth every 4 hours as needed for Itching 20 Yes Carmen Lopez DO   hydroCHLOROthiazide (HYDRODIURIL) 25 MG tablet take 1 tablet by mouth once daily 20   Holland Bowen MD   clopidogrel (PLAVIX) 75 MG tablet take 1 tablet by mouth once daily 20   Aleks Martel MD   glipiZIDE (GLUCOTROL XL) 10 MG extended extraocular motion and no nystagmus. Left eye: Normal extraocular motion and no nystagmus. Conjunctiva/sclera:      Right eye: Right conjunctiva is not injected. No chemosis or exudate. Left eye: Left conjunctiva is not injected. No chemosis or exudate. Pupils: Pupils are equal, round, and reactive to light. Comments: Edema of left upper eyelid   Neck:      Musculoskeletal: No neck rigidity. Cardiovascular:      Rate and Rhythm: Normal rate. Pulmonary:      Effort: Pulmonary effort is normal.   Lymphadenopathy:      Cervical: No cervical adenopathy. Neurological:      Mental Status: He is alert. Gait: Gait normal.         DIFFERENTIAL  DIAGNOSIS     PLAN (LABS / IMAGING / EKG):  No orders of the defined types were placed in this encounter. MEDICATIONS ORDERED:  Orders Placed This Encounter   Medications    diphenhydrAMINE (BENADRYL) 25 MG capsule     Sig: Take 1 capsule by mouth every 4 hours as needed for Itching     Dispense:  1 capsule     Refill:  0       DDX: Blepharitis, conjunctivitis, hordeolum, corneal abrasion, insect sting    DIAGNOSTIC RESULTS / EMERGENCY DEPARTMENT COURSE / MDM   LAB RESULTS:  No results found for this visit on 06/04/20. IMPRESSION: 62-year-old male in no obvious distress presenting with left eye swelling and itching. Denying any trauma to the eye, drainage or fever. No eye pain at this time. Possible blepharitis versus insect sting versus allergic reaction. And for symptomatic treatment and likely discharge    RADIOLOGY:  None    EMERGENCY DEPARTMENT COURSE:  Evaluated at bedside, vital signs stable, afebrile. Visual acuity intact, short-term motions intact, no obvious foreign bodies or trauma. His eyelid swelling of top left eyelid. Possible insect bite versus blepharitis versus allergic reaction. Patient informed to place warm compresses on her eye at home. Given prescription for p.o. Benadryl at home.   Patient verbalized

## 2020-06-04 NOTE — ED PROVIDER NOTES
[facility]     Emergency Department     Faculty Attestation    I performed a history and physical examination of the patient and discussed management with the resident. I have reviewed and agree with the residents findings including all diagnostic interpretations, and treatment plans as written. Any areas of disagreement are noted on the chart. I was personally present for the key portions of any procedures. I have documented in the chart those procedures where I was not present during the key portions. I have reviewed the emergency nurses triage note. I agree with the chief complaint, past medical history, past surgical history, allergies, medications, social and family history as documented unless otherwise noted below. Documentation of the HPI, Physical Exam and Medical Decision Making performed by [email or web address] is based on my personal performance of the HPI, PE and MDM. For Physician Assistant/ Nurse Practitioner cases/documentation I have personally evaluated this patient and have completed at least one if not all key elements of the E/M (history, physical exam, and MDM). Additional findings are as noted. L superior eyelid swelling and itching this morning no pain. Felt some discomfort earlier this week but no pain at this time. No redness no increased warmth, no vision change, did not take anything for swelling. Denies any foreign body sensation. No h/o allergies. No runny nose    Patient with mild swelling to the superior L eye lid. No redness no increased warmth, no stye noted. Non tender to palpation. EOMI, conjunctive clear, no redness. Possible allergic reaction,   Benadryl,  Pcp follow up  Return for worsening redness or pain.      Pedro Escobar D.O, M.P.H  Attending Emergency Medicine Physician         Pedro Escobar DO  06/04/20 [id number]

## 2020-06-08 RX ORDER — CLOPIDOGREL BISULFATE 75 MG/1
TABLET ORAL
Qty: 30 TABLET | Refills: 0 | Status: SHIPPED | OUTPATIENT
Start: 2020-06-08 | End: 2020-07-10

## 2020-06-12 ENCOUNTER — OFFICE VISIT (OUTPATIENT)
Dept: FAMILY MEDICINE CLINIC | Age: 60
End: 2020-06-12
Payer: COMMERCIAL

## 2020-06-12 VITALS
HEART RATE: 66 BPM | TEMPERATURE: 98 F | WEIGHT: 192 LBS | SYSTOLIC BLOOD PRESSURE: 143 MMHG | BODY MASS INDEX: 29.19 KG/M2 | DIASTOLIC BLOOD PRESSURE: 81 MMHG

## 2020-06-12 PROCEDURE — 2022F DILAT RTA XM EVC RTNOPTHY: CPT | Performed by: STUDENT IN AN ORGANIZED HEALTH CARE EDUCATION/TRAINING PROGRAM

## 2020-06-12 PROCEDURE — 99213 OFFICE O/P EST LOW 20 MIN: CPT | Performed by: STUDENT IN AN ORGANIZED HEALTH CARE EDUCATION/TRAINING PROGRAM

## 2020-06-12 PROCEDURE — G8427 DOCREV CUR MEDS BY ELIG CLIN: HCPCS | Performed by: STUDENT IN AN ORGANIZED HEALTH CARE EDUCATION/TRAINING PROGRAM

## 2020-06-12 PROCEDURE — 3017F COLORECTAL CA SCREEN DOC REV: CPT | Performed by: STUDENT IN AN ORGANIZED HEALTH CARE EDUCATION/TRAINING PROGRAM

## 2020-06-12 PROCEDURE — 1036F TOBACCO NON-USER: CPT | Performed by: STUDENT IN AN ORGANIZED HEALTH CARE EDUCATION/TRAINING PROGRAM

## 2020-06-12 PROCEDURE — G8419 CALC BMI OUT NRM PARAM NOF/U: HCPCS | Performed by: STUDENT IN AN ORGANIZED HEALTH CARE EDUCATION/TRAINING PROGRAM

## 2020-06-12 PROCEDURE — 3046F HEMOGLOBIN A1C LEVEL >9.0%: CPT | Performed by: STUDENT IN AN ORGANIZED HEALTH CARE EDUCATION/TRAINING PROGRAM

## 2020-06-12 ASSESSMENT — ENCOUNTER SYMPTOMS
NAUSEA: 0
BACK PAIN: 0
ABDOMINAL PAIN: 0
SHORTNESS OF BREATH: 0
WHEEZING: 0
DIARRHEA: 0
CONSTIPATION: 0
BLOOD IN STOOL: 0
EYE REDNESS: 0
CHEST TIGHTNESS: 0
COUGH: 0
EYE PAIN: 0
EYE DISCHARGE: 0
PHOTOPHOBIA: 0
SORE THROAT: 0

## 2020-06-12 NOTE — PROGRESS NOTES
I have reviewed and discussed key elements of [address] [place], [place] [address] with the resident including plan of care and follow up and agree with the care bina plan. Follow up emergency room visit for eyelid swelling. Diagnosis Orders   1.  Type 2 diabetes mellitus without complication, unspecified whether long term insulin use (HCC)  Basic Metabolic Panel    Microalbumin, Ur    Lipid Panel     Follow up four weeks
Visit Information    Have you changed or started any medications since your last visit including any over-the-counter medicines, vitamins, or herbal medicines? no   Have you stopped taking any of your medications? Is so, why? -  no  Are you having any side effects from any of your medications? - no    Have you seen any other physician or provider since your last visit?  no   Have you had any other diagnostic tests since your last visit?  no   Have you been seen in the emergency room and/or had an admission in a hospital since we last saw you?  no   Have you had your routine dental cleaning in the past 6 months?  no     Do you have an active MyChart account? If no, what is the barrier?   No:     Patient Care Team:  Gerardo Farley MD as PCP - General (Family Medicine)    Medical History Review  Past Medical, Family, and Social History reviewed and does not contribute to the patient presenting condition    Health Maintenance   Topic Date Due    Hepatitis B vaccine (1 of 3 - Risk 3-dose series) 04/27/1981    Shingles Vaccine (1 of 2) 04/27/2012    Diabetic retinal exam  03/30/2016    Lipid screen  02/11/2017    Diabetic foot exam  09/18/2018    Diabetic microalbuminuria test  09/18/2018    Potassium monitoring  02/24/2019    Creatinine monitoring  02/24/2019    A1C test (Diabetic or Prediabetic)  07/09/2020    Flu vaccine (Season Ended) 09/01/2020    Colon cancer screen colonoscopy  08/25/2024    DTaP/Tdap/Td vaccine (2 - Td) 06/16/2027    Pneumococcal 0-64 years Vaccine  Completed    Hepatitis C screen  Addressed    HIV screen  Addressed    Hepatitis A vaccine  Aged Out    Hib vaccine  Aged Out    Meningococcal (ACWY) vaccine  Aged Out
BMI 29.19 kg/m²    BP Readings from Last 3 Encounters:   06/12/20 (!) 148/82   06/04/20 (!) 189/96   04/01/19 (!) 143/86       Physical Exam  Constitutional:       Appearance: Normal appearance. Neck:      Musculoskeletal: Normal range of motion and neck supple. Cardiovascular:      Rate and Rhythm: Normal rate and regular rhythm. Pulmonary:      Effort: Pulmonary effort is normal.   Neurological:      Mental Status: He is alert. Lab Results   Component Value Date    WBC 7.4 02/24/2018    HGB 11.5 (L) 02/24/2018    HCT 37.7 (L) 02/24/2018     02/24/2018    CHOL 160 02/11/2016    TRIG 125 02/11/2016    HDL 46 02/11/2016    ALT 11 02/11/2016    AST 19 02/11/2016     02/24/2018    K 4.5 02/24/2018    CL 96 (L) 02/24/2018    CREATININE 0.85 02/24/2018    BUN 12 02/24/2018    CO2 27 02/24/2018    LABA1C 8.8 07/09/2019    LABMICR 274 02/11/2016     Lab Results   Component Value Date    CALCIUM 9.6 02/24/2018     Lab Results   Component Value Date    LDLCALC 120 07/24/2013    LDLCHOLESTEROL 89 02/11/2016       Assessment and Plan:    1. Type 2 diabetes mellitus without complication, unspecified whether long term insulin use (HCC)    - Basic Metabolic Panel; Future  - Microalbumin, Ur; Future  - Lipid Panel; Future  - follow up in 4 weeks for full diabetic visit    2. Eye allergy  - resolved    Requested Prescriptions      No prescriptions requested or ordered in this encounter       Medications Discontinued During This Encounter   Medication Reason    metoprolol (LOPRESSOR) 109 MG tablet [address] received counseling on the following healthy behaviors:nutrition, exercise and medication adherence    Discussed use, benefit, and side effects of prescribed medications. Barriers to medication compliance addressed. All patient questions answered. Pt voicedunderstanding. Return in about 4 weeks (around 7/10/2020).

## 2020-06-12 NOTE — PATIENT INSTRUCTIONS
Thank you for letting us take care of you today. We hope all your questions were addressed. If a question was overlooked or something else comes to mind after you return home, please contact a member of your Care Team listed below. Please make sure you have a routine office visit set up to follow-up on [address]. Your Care Team at [place] [address] is Team #1  Inga Maciel MD (Faculty)  Domonique Villa DO (Faculty)  Teresa White (Resident)  Jose Elias Stephens (Resident)  Fatuma Alicia MD (Resident)  Colleen Tang MD (Resident)  Coby Ruiz., [facility]  Ranjeet Medina., [facility] office)  [facility] office)  [facility] office)  SHIVA Izaguirre, [facility] ([address] [facility])  Dion Ramsey, [facility] (Clinical Pharmacist)     Office phone number: [phone]    If you need to get in right away due to illness, please be advised we have \"Same Day\" appointments available Monday-Friday. Please call us at [phone] option #3 to schedule your \"Same Day\" appointment.

## 2020-07-06 NOTE — TELEPHONE ENCOUNTER
Last visit:   Last Med refill:   Does patient have enough medication for 72 hours: No:     Next Visit Date:  No future appointments. Health Maintenance   Topic Date Due    Diabetic retinal exam  03/30/2016    Lipid screen  02/11/2017    Diabetic foot exam  09/18/2018    Diabetic microalbuminuria test  09/18/2018    Potassium monitoring  02/24/2019    Creatinine monitoring  02/24/2019    A1C test (Diabetic or Prediabetic)  07/09/2020    Hepatitis B vaccine (1 of 3 - Risk 3-dose series) 06/12/2021 (Originally [birth year]27/1979)    Shingles Vaccine (1 of 2) 06/12/2021 (Originally 4/27/2012)    Flu vaccine (1) 09/01/2020    Colon cancer screen colonoscopy  08/25/2024    DTaP/Tdap/Td vaccine (2 - Td) 06/16/2027    Pneumococcal 0-64 years Vaccine  Completed    Hepatitis C screen  Addressed    HIV screen  Addressed    Hepatitis A vaccine  Aged Out    Hib vaccine  Aged Out    Meningococcal (ACWY) vaccine  Aged Out       Hemoglobin A1C (%)   Date Value   07/09/2019 8.8   05/17/2018 8.2   09/28/2017 8             ( goal A1C is < 7)   Microalb/Crt. Ratio (mcg/mg creat)   Date Value   02/11/2016 274     LDL Cholesterol (mg/dL)   Date Value   02/11/2016 89     LDL Calculated (mg/dL)   Date Value   07/24/2013 120       (goal LDL is <100)   AST (U/L)   Date Value   02/11/2016 19     ALT (U/L)   Date Value   02/11/2016 11     BUN (mg/dL)   Date Value   02/24/2018 12     BP Readings from Last 3 Encounters:   06/12/20 (!) 143/81   06/04/20 (!) 189/96   04/01/19 (!) 143/86          (goal 120/80)    All Future Testing planned in [facility]  Lab Frequency Next Occurrence   Basic Metabolic Panel Once 27/69/0833   Microalbumin, Ur Once 06/12/2021   Lipid Panel Once 06/12/2021               Patient Active Problem List:     HLD (hyperlipidemia)     HTN (hypertension)     DM (diabetes mellitus) ([facility] [address]     Chronic back pain     Thoracic aortic aneurysm diagnosed  2010?  at [facility]     Acute pain of right shoulder     Other male erectile dysfunction           Please address the medication refill and close the encounter. If I can be of assistance, please route to the applicable pool. Thank you.

## 2020-07-10 RX ORDER — CLOPIDOGREL BISULFATE 75 MG/1
TABLET ORAL
Qty: 30 TABLET | Refills: 0 | Status: SHIPPED | OUTPATIENT
Start: 2020-07-10 | End: 2020-08-04

## 2020-07-15 NOTE — TELEPHONE ENCOUNTER
E-scribe request for Lisinopril 40 MG. Please review and e-scribe if applicable. Next Visit Date:  Visit date not found    Health Maintenance   Topic Date Due    Diabetic retinal exam  03/30/2016    Lipid screen  02/11/2017    Diabetic foot exam  09/18/2018    Diabetic microalbuminuria test  09/18/2018    Potassium monitoring  02/24/2019    Creatinine monitoring  02/24/2019    A1C test (Diabetic or Prediabetic)  07/09/2020    Hepatitis B vaccine (1 of 3 - Risk 3-dose series) 06/12/2021 (Originally 4/27/1979)    Shingles Vaccine (1 of 2) 06/12/2021 (Originally 4/27/2012)    Flu vaccine (1) 09/01/2020    Colon cancer screen colonoscopy  08/25/2024    DTaP/Tdap/Td vaccine (2 - Td) 06/16/2027    Pneumococcal 0-64 years Vaccine  Completed    Hepatitis C screen  Addressed    HIV screen  Addressed    Hepatitis A vaccine  Aged Out    Hib vaccine  Aged Out    Meningococcal (ACWY) vaccine  Aged Out             (applicable per patient's age: Cancer Screenings, Depression Screening, Fall Risk Screening, Immunizations)    Hemoglobin A1C (%)   Date Value   07/09/2019 8.8   05/17/2018 8.2   09/28/2017 8     Microalb/Crt. Ratio (mcg/mg creat)   Date Value   02/11/2016 274     LDL Cholesterol (mg/dL)   Date Value   02/11/2016 89     LDL Calculated (mg/dL)   Date Value   07/24/2013 120     AST (U/L)   Date Value   02/11/2016 19     ALT (U/L)   Date Value   02/11/2016 11     BUN (mg/dL)   Date Value   02/24/2018 12      (goal A1C is < 7)   (goal LDL is <100) need 30-50% reduction from baseline     BP Readings from Last 3 Encounters:   06/12/20 (!) 143/81   06/04/20 (!) 189/96   04/01/19 (!) 143/86    (goal /80)      All Future Testing planned in [facility]:  Lab Frequency Next Occurrence   Basic Metabolic Panel Once 15/20/3127   Microalbumin, Ur Once 06/12/2021   Lipid Panel Once 06/12/2021       Next Visit Date:  No future appointments.          Patient Active Problem List:     HLD (hyperlipidemia)     HTN

## 2020-07-21 RX ORDER — LISINOPRIL 40 MG/1
TABLET ORAL
Qty: 30 TABLET | Refills: 3 | Status: SHIPPED | OUTPATIENT
Start: 2020-07-21 | End: 2020-11-11

## 2020-08-03 NOTE — TELEPHONE ENCOUNTER
E-scribe request for pending medication. Please review and e-scribe if applicable. Last Visit Date:  6/12/2020  Next Visit Date:  Visit date not found    Hemoglobin A1C (%)   Date Value   07/09/2019 8.8   05/17/2018 8.2   09/28/2017 8             ( goal A1C is < 7)   Microalb/Crt. Ratio (mcg/mg creat)   Date Value   02/11/2016 274     LDL Cholesterol (mg/dL)   Date Value   02/11/2016 89     LDL Calculated (mg/dL)   Date Value   07/24/2013 120       (goal LDL is <100)   AST (U/L)   Date Value   02/11/2016 19     ALT (U/L)   Date Value   02/11/2016 11     BUN (mg/dL)   Date Value   02/24/2018 12     BP Readings from Last 3 Encounters:   06/12/20 (!) 143/81   06/04/20 (!) 189/96   04/01/19 (!) 143/86          (goal 120/80)        Patient Active Problem List:     HLD (hyperlipidemia)     HTN (hypertension)     DM (diabetes mellitus) (HCC)     Chronic back pain     Thoracic aortic aneurysm diagnosed  2010?  at [facility]     Acute pain of right shoulder     Other male erectile dysfunction

## 2020-08-04 RX ORDER — CLOPIDOGREL BISULFATE 75 MG/1
TABLET ORAL
Qty: 30 TABLET | Refills: 0 | Status: SHIPPED | OUTPATIENT
Start: 2020-08-04 | End: 2020-09-03

## 2020-08-31 NOTE — TELEPHONE ENCOUNTER
Please address the medication refill and close the encounter. If I can be of assistance, please route to the applicable pool. Thank you. Last visit: 06/12/20  Last Med refill: 08/04/20  Does patient have enough medication for 72 hours: No:     Next Visit Date:  No future appointments. Health Maintenance   Topic Date Due    Diabetic retinal exam  03/30/2016    Lipid screen  02/11/2017    Diabetic foot exam  09/18/2018    Diabetic microalbuminuria test  09/18/2018    Potassium monitoring  02/24/2019    Creatinine monitoring  02/24/2019    A1C test (Diabetic or Prediabetic)  07/09/2020    Hepatitis B vaccine (1 of 3 - Risk 3-dose series) 06/12/2021 (Originally 4/27/1979)    Shingles Vaccine (1 of 2) 06/12/2021 (Originally 4/27/2012)    Flu vaccine (1) 09/01/2020    Colon cancer screen colonoscopy  08/25/2024    DTaP/Tdap/Td vaccine (2 - Td) 06/16/2027    Pneumococcal 0-64 years Vaccine  Completed    Hepatitis C screen  Addressed    HIV screen  Addressed    Hepatitis A vaccine  Aged Out    Hib vaccine  Aged Out    Meningococcal (ACWY) vaccine  Aged Out       Hemoglobin A1C (%)   Date Value   07/09/2019 8.8   05/17/2018 8.2   09/28/2017 8             ( goal A1C is < 7)   Microalb/Crt.  Ratio (mcg/mg creat)   Date Value   02/11/2016 274     LDL Cholesterol (mg/dL)   Date Value   02/11/2016 89     LDL Calculated (mg/dL)   Date Value   07/24/2013 120       (goal LDL is <100)   AST (U/L)   Date Value   02/11/2016 19     ALT (U/L)   Date Value   02/11/2016 11     BUN (mg/dL)   Date Value   02/24/2018 12     BP Readings from Last 3 Encounters:   06/12/20 (!) 143/81   06/04/20 (!) 189/96   04/01/19 (!) 143/86          (goal 120/80)    All Future Testing planned in [facility]  Lab Frequency Next Occurrence   Basic Metabolic Panel Once 11/40/3284   Microalbumin, Ur Once 06/12/2021   Lipid Panel Once 06/12/2021               Patient Active Problem List:     HLD (hyperlipidemia)     HTN (hypertension)     DM (diabetes mellitus) (HCC)     Chronic back pain     Thoracic aortic aneurysm diagnosed  2010?  at [facility]     Acute pain of right shoulder     Other male erectile dysfunction

## 2020-09-03 RX ORDER — CLOPIDOGREL BISULFATE 75 MG/1
TABLET ORAL
Qty: 30 TABLET | Refills: 0 | Status: SHIPPED | OUTPATIENT
Start: 2020-09-03 | End: 2020-09-30

## 2020-09-16 NOTE — TELEPHONE ENCOUNTER
Last Visit Date:  6-12-20  Next Visit Date:  Visit date not found    Hemoglobin A1C (%)   Date Value   07/09/2019 8.8   05/17/2018 8.2   09/28/2017 8             ( goal A1C is < 7)   Microalb/Crt. Ratio (mcg/mg creat)   Date Value   02/11/2016 274     LDL Cholesterol (mg/dL)   Date Value   02/11/2016 89     LDL Calculated (mg/dL)   Date Value   07/24/2013 120       (goal LDL is <100)   AST (U/L)   Date Value   02/11/2016 19     ALT (U/L)   Date Value   02/11/2016 11     BUN (mg/dL)   Date Value   02/24/2018 12     BP Readings from Last 3 Encounters:   06/12/20 (!) 143/81   06/04/20 (!) 189/96   04/01/19 (!) 143/86          (goal 120/80)        Patient Active Problem List:     HLD (hyperlipidemia)     HTN (hypertension)     DM (diabetes mellitus) (HCC)     Chronic back pain     Thoracic aortic aneurysm diagnosed  2010?  at [facility]     Acute pain of right shoulder     Other male erectile dysfunction      ----Jeevan Quiñonez

## 2020-09-17 RX ORDER — METOPROLOL TARTRATE 100 MG/1
TABLET ORAL
Qty: 60 TABLET | Refills: 3 | Status: SHIPPED | OUTPATIENT
Start: 2020-09-17 | End: 2021-01-20

## 2020-09-28 NOTE — TELEPHONE ENCOUNTER
Last Visit Date:  6-12-20  Next Visit Date:  Visit date not found    Hemoglobin A1C (%)   Date Value   07/09/2019 8.8   05/17/2018 8.2   09/28/2017 8             ( goal A1C is < 7)   Microalb/Crt. Ratio (mcg/mg creat)   Date Value   02/11/2016 274     LDL Cholesterol (mg/dL)   Date Value   02/11/2016 89     LDL Calculated (mg/dL)   Date Value   07/24/2013 120       (goal LDL is <100)   AST (U/L)   Date Value   02/11/2016 19     ALT (U/L)   Date Value   02/11/2016 11     BUN (mg/dL)   Date Value   02/24/2018 12     BP Readings from Last 3 Encounters:   06/12/20 (!) 143/81   06/04/20 (!) 189/96   04/01/19 (!) 143/86          (goal 120/80)        Patient Active Problem List:     HLD (hyperlipidemia)     HTN (hypertension)     DM (diabetes mellitus) (HCC)     Chronic back pain     Thoracic aortic aneurysm diagnosed  2010?  at [facility]     Acute pain of right shoulder     Other male erectile dysfunction      ----Ruth Dress

## 2020-09-30 RX ORDER — CLOPIDOGREL BISULFATE 75 MG/1
TABLET ORAL
Qty: 30 TABLET | Refills: 0 | Status: SHIPPED | OUTPATIENT
Start: 2020-09-30 | End: 2020-10-27

## 2020-10-13 RX ORDER — ATORVASTATIN CALCIUM 80 MG/1
80 TABLET, FILM COATED ORAL DAILY
Qty: 90 TABLET | Refills: 1 | Status: SHIPPED | OUTPATIENT
Start: 2020-10-13 | End: 2021-04-12

## 2020-10-13 NOTE — TELEPHONE ENCOUNTER
Last visit: 06/12/20  Last Med refill:   Does patient have enough medication for 72 hours:     Next Visit Date:  No future appointments. Health Maintenance   Topic Date Due    Diabetic retinal exam  03/30/2016    Lipid screen  02/11/2017    Diabetic foot exam  09/18/2018    Diabetic microalbuminuria test  09/18/2018    Potassium monitoring  02/24/2019    Creatinine monitoring  02/24/2019    A1C test (Diabetic or Prediabetic)  07/09/2020    Flu vaccine (1) 09/01/2020    Hepatitis B vaccine (1 of 3 - Risk 3-dose series) 06/12/2021 (Originally 4/27/1979)    Shingles Vaccine (1 of 2) 06/12/2021 (Originally 4/27/2012)    Colon cancer screen colonoscopy  08/25/2024    DTaP/Tdap/Td vaccine (2 - Td) 06/16/2027    Pneumococcal 0-64 years Vaccine  Completed    Hepatitis C screen  Addressed    HIV screen  Addressed    Hepatitis A vaccine  Aged Out    Hib vaccine  Aged Out    Meningococcal (ACWY) vaccine  Aged Out       Hemoglobin A1C (%)   Date Value   07/09/2019 8.8   05/17/2018 8.2   09/28/2017 8             ( goal A1C is < 7)   Microalb/Crt. Ratio (mcg/mg creat)   Date Value   02/11/2016 274     LDL Cholesterol (mg/dL)   Date Value   02/11/2016 89     LDL Calculated (mg/dL)   Date Value   07/24/2013 120       (goal LDL is <100)   AST (U/L)   Date Value   02/11/2016 19     ALT (U/L)   Date Value   02/11/2016 11     BUN (mg/dL)   Date Value   02/24/2018 12     BP Readings from Last 3 Encounters:   06/12/20 (!) 143/81   06/04/20 (!) 189/96   04/01/19 (!) 143/86          (goal 120/80)    All Future Testing planned in [facility]  Lab Frequency Next Occurrence   Basic Metabolic Panel Once 63/04/6782   Microalbumin, Ur Once 06/12/2021   Lipid Panel Once 06/12/2021               Patient Active Problem List:     HLD (hyperlipidemia)     HTN (hypertension)     DM (diabetes mellitus) ([address]     Chronic back pain     Thoracic aortic aneurysm diagnosed  2010?  at [facility]     Acute pain of right shoulder Other male erectile dysfunction

## 2020-10-14 RX ORDER — AMLODIPINE BESYLATE 10 MG/1
TABLET ORAL
Qty: 90 TABLET | Refills: 3 | Status: SHIPPED | OUTPATIENT
Start: 2020-10-14 | End: 2021-08-06

## 2020-10-14 NOTE — TELEPHONE ENCOUNTER
HTN (hypertension)     DM (diabetes mellitus) ([facility] [address]     Chronic back pain     Thoracic aortic aneurysm diagnosed  2010?  at [facility]     Acute pain of right shoulder     Other male erectile dysfunction

## 2020-11-04 NOTE — TELEPHONE ENCOUNTER
.     Last Visit Date:  6-12-20  Next Visit Date:  Visit date not found    Hemoglobin A1C (%)   Date Value   07/09/2019 8.8   05/17/2018 8.2   09/28/2017 8             ( goal A1C is < 7)   Microalb/Crt. Ratio (mcg/mg creat)   Date Value   02/11/2016 274     LDL Cholesterol (mg/dL)   Date Value   02/11/2016 89     LDL Calculated (mg/dL)   Date Value   07/24/2013 120       (goal LDL is <100)   AST (U/L)   Date Value   02/11/2016 19     ALT (U/L)   Date Value   02/11/2016 11     BUN (mg/dL)   Date Value   02/24/2018 12     BP Readings from Last 3 Encounters:   06/12/20 (!) 143/81   06/04/20 (!) 189/96   04/01/19 (!) 143/86          (goal 120/80)        Patient Active Problem List:     HLD (hyperlipidemia)     HTN (hypertension)     DM (diabetes mellitus) (HCC)     Chronic back pain     Thoracic aortic aneurysm diagnosed  2010?  at [facility]     Acute pain of right shoulder     Other male erectile dysfunction      ----Ruby Friday

## 2020-11-08 RX ORDER — IBUPROFEN 800 MG/1
TABLET ORAL
Qty: 120 TABLET | Refills: 3 | Status: SHIPPED | OUTPATIENT
Start: 2020-11-08 | End: 2021-10-08

## 2020-11-11 RX ORDER — LISINOPRIL 40 MG/1
TABLET ORAL
Qty: 120 TABLET | Refills: 0 | Status: SHIPPED | OUTPATIENT
Start: 2020-11-11 | End: 2021-03-12

## 2020-11-11 NOTE — TELEPHONE ENCOUNTER
E-scribe request for pending medication. Please review and e-scribe if applicable. Last Visit Date:  6/12/2020  Next Visit Date:  Visit date not found    Hemoglobin A1C (%)   Date Value   07/09/2019 8.8   05/17/2018 8.2   09/28/2017 8             ( goal A1C is < 7)   Microalb/Crt. Ratio (mcg/mg creat)   Date Value   02/11/2016 274     LDL Cholesterol (mg/dL)   Date Value   02/11/2016 89     LDL Calculated (mg/dL)   Date Value   07/24/2013 120       (goal LDL is <100)   AST (U/L)   Date Value   02/11/2016 19     ALT (U/L)   Date Value   02/11/2016 11     BUN (mg/dL)   Date Value   02/24/2018 12     BP Readings from Last 3 Encounters:   06/12/20 (!) 143/81   06/04/20 (!) 189/96   04/01/19 (!) 143/86          (goal 120/80)        Patient Active Problem List:     HLD (hyperlipidemia)     HTN (hypertension)     DM (diabetes mellitus) (HCC)     Chronic back pain     Thoracic aortic aneurysm diagnosed  2010?  at [facility]     Acute pain of right shoulder     Other male erectile dysfunction

## 2021-01-11 ENCOUNTER — OFFICE VISIT (OUTPATIENT)
Dept: FAMILY MEDICINE CLINIC | Age: 61
End: 2021-01-11
Payer: MEDICARE

## 2021-01-11 VITALS
HEIGHT: 68 IN | DIASTOLIC BLOOD PRESSURE: 89 MMHG | HEART RATE: 69 BPM | SYSTOLIC BLOOD PRESSURE: 158 MMHG | WEIGHT: 190 LBS | BODY MASS INDEX: 28.79 KG/M2 | TEMPERATURE: 97.6 F

## 2021-01-11 DIAGNOSIS — I10 ESSENTIAL HYPERTENSION: ICD-10-CM

## 2021-01-11 DIAGNOSIS — E11.9 TYPE 2 DIABETES MELLITUS WITHOUT COMPLICATION, WITHOUT LONG-TERM CURRENT USE OF INSULIN (HCC): ICD-10-CM

## 2021-01-11 LAB — HBA1C MFR BLD: 103 %

## 2021-01-11 PROCEDURE — G8419 CALC BMI OUT NRM PARAM NOF/U: HCPCS | Performed by: STUDENT IN AN ORGANIZED HEALTH CARE EDUCATION/TRAINING PROGRAM

## 2021-01-11 PROCEDURE — G8427 DOCREV CUR MEDS BY ELIG CLIN: HCPCS | Performed by: STUDENT IN AN ORGANIZED HEALTH CARE EDUCATION/TRAINING PROGRAM

## 2021-01-11 PROCEDURE — 3017F COLORECTAL CA SCREEN DOC REV: CPT | Performed by: STUDENT IN AN ORGANIZED HEALTH CARE EDUCATION/TRAINING PROGRAM

## 2021-01-11 PROCEDURE — 1036F TOBACCO NON-USER: CPT | Performed by: STUDENT IN AN ORGANIZED HEALTH CARE EDUCATION/TRAINING PROGRAM

## 2021-01-11 PROCEDURE — 2022F DILAT RTA XM EVC RTNOPTHY: CPT | Performed by: STUDENT IN AN ORGANIZED HEALTH CARE EDUCATION/TRAINING PROGRAM

## 2021-01-11 PROCEDURE — 3046F HEMOGLOBIN A1C LEVEL >9.0%: CPT | Performed by: STUDENT IN AN ORGANIZED HEALTH CARE EDUCATION/TRAINING PROGRAM

## 2021-01-11 PROCEDURE — 83036 HEMOGLOBIN GLYCOSYLATED A1C: CPT | Performed by: STUDENT IN AN ORGANIZED HEALTH CARE EDUCATION/TRAINING PROGRAM

## 2021-01-11 PROCEDURE — G8484 FLU IMMUNIZE NO ADMIN: HCPCS | Performed by: STUDENT IN AN ORGANIZED HEALTH CARE EDUCATION/TRAINING PROGRAM

## 2021-01-11 PROCEDURE — 99213 OFFICE O/P EST LOW 20 MIN: CPT | Performed by: STUDENT IN AN ORGANIZED HEALTH CARE EDUCATION/TRAINING PROGRAM

## 2021-01-11 RX ORDER — HYDRALAZINE HYDROCHLORIDE 10 MG/1
10 TABLET, FILM COATED ORAL 3 TIMES DAILY
Qty: 90 TABLET | Refills: 1 | Status: SHIPPED | OUTPATIENT
Start: 2021-01-11 | End: 2021-03-11

## 2021-01-11 RX ORDER — HYDRALAZINE HYDROCHLORIDE 25 MG/1
25 TABLET, FILM COATED ORAL 2 TIMES DAILY
Qty: 60 TABLET | Refills: 1 | Status: SHIPPED | OUTPATIENT
Start: 2021-01-11 | End: 2021-01-11 | Stop reason: DRUGHIGH

## 2021-01-11 RX ORDER — HYDROCHLOROTHIAZIDE 25 MG/1
TABLET ORAL
Qty: 90 TABLET | Refills: 1 | Status: SHIPPED | OUTPATIENT
Start: 2021-01-11 | End: 2021-05-04 | Stop reason: SDUPTHER

## 2021-01-11 RX ORDER — HYDROCHLOROTHIAZIDE 25 MG/1
TABLET ORAL
Qty: 30 TABLET | Refills: 3 | Status: SHIPPED | OUTPATIENT
Start: 2021-01-11 | End: 2021-01-11 | Stop reason: DRUGHIGH

## 2021-01-11 ASSESSMENT — PATIENT HEALTH QUESTIONNAIRE - PHQ9
2. FEELING DOWN, DEPRESSED OR HOPELESS: 0
SUM OF ALL RESPONSES TO PHQ9 QUESTIONS 1 & 2: 0
SUM OF ALL RESPONSES TO PHQ QUESTIONS 1-9: 0
SUM OF ALL RESPONSES TO PHQ QUESTIONS 1-9: 0

## 2021-01-11 ASSESSMENT — ENCOUNTER SYMPTOMS: DIARRHEA: 1

## 2021-01-11 NOTE — PROGRESS NOTES
[facility] Medicine Residency Program - Outpatient Note      Subjective:    Meryle Baron is a 64 y.o. male with  has a past medical history of Arthritis, Cerebral artery occlusion with cerebral infarction [facility]), Chronic back pain, Elbow pain, chronic, Full dentures, Fungus infection, Hyperlipidemia, Hypertension, Thoracic aortic aneurysm diagnosed  2010 at [facility], and Type II or unspecified type diabetes mellitus without mention of complication, not stated as uncontrolled. Presented to the office today for:  Chief Complaint   Patient presents with    Diabetes    Health Maintenance     pt refuse flu       HPI    Established patient who has not been seen for several months. Patient has a history of type 2 diabetes mellitus on glipizide and Metformin. Patient also has a history of hypertension on Lopressor 100 twice daily, hydrochlorothiazide 25 mg, amlodipine 10 mg, lisinopril 40 mg. Patient's blood pressure was markedly elevated at today's visit. Patient also has a history of CAD, patient had a cardiac intervention although it is unclear if he had stents or only CABG. Patient A1c is increased to 10.3 today. We discussed that patient may need to eventually start insulin, at this time he is maxed out on glipizide and Metformin. We will refer patient to our outpatient pharmacy to discuss further options. Also encourage patient to increase physical activity and get better control of diet and attempt to avoid insulin. We will also refer patient to diabetes education. We will revisit in 1 month, if patient's A1c has made any improvement we will consider continuing with the lifestyle modification route and oral medications. If there is no improvement we will start patient on a long-acting insulin. Patient also describes loose stools for the past 2 to 3 days. Denies any fevers or severe abdominal pain.   Patient advised to continue monitoring, watch his diet, will revisit 1 month    Review of Systems   Constitutional: Negative for chills, diaphoresis and fever. HENT: Negative. Cardiovascular: Negative for chest pain, palpitations and leg swelling. Gastrointestinal: Positive for diarrhea. Neurological: Negative for dizziness, light-headedness and headaches. The patient has a   Family History   Problem Relation Age of Onset    High Blood Pressure Mother     Diabetes Mother     Cancer Father     Heart Disease Father     Stroke Brother     Cirrhosis Brother        Objective:    BP (!) 158/89   Pulse 69   Temp 97.6 °F (36.4 °C) (Temporal)   Ht 5' 8\" (1.727 m)   Wt 190 lb (86.2 kg)   BMI 28.89 kg/m²    BP Readings from Last 3 Encounters:   01/11/21 (!) 158/89   06/12/20 (!) 143/81   06/04/20 (!) 189/96       Physical Exam  Vitals signs reviewed. Constitutional:       Appearance: Normal appearance. Eyes:      Extraocular Movements: Extraocular movements intact. Conjunctiva/sclera: Conjunctivae normal.   Cardiovascular:      Rate and Rhythm: Normal rate and regular rhythm. Pulses: Normal pulses. Comments: Loud S2  Pulmonary:      Effort: Pulmonary effort is normal. No respiratory distress. Breath sounds: Normal breath sounds. No wheezing or rales. Abdominal:      General: Bowel sounds are normal. There is no distension. Palpations: Abdomen is soft. Tenderness: There is no abdominal tenderness. Musculoskeletal:      Right lower leg: No edema. Left lower leg: No edema. Skin:     General: Skin is warm. Neurological:      General: No focal deficit present. Mental Status: He is alert.          Lab Results   Component Value Date    WBC 7.4 02/24/2018    HGB 11.5 (L) 02/24/2018    HCT 37.7 (L) 02/24/2018     02/24/2018    CHOL 160 02/11/2016    TRIG 125 02/11/2016    HDL 46 02/11/2016    ALT 11 02/11/2016    AST 19 02/11/2016     02/24/2018    K 4.5 02/24/2018    CL 96 (L) 02/24/2018 CREATININE 0.85 02/24/2018    BUN 12 02/24/2018    CO2 27 02/24/2018    LABA1C 8.8 07/09/2019    LABMICR 274 02/11/2016     Lab Results   Component Value Date    CALCIUM 9.6 02/24/2018     Lab Results   Component Value Date    LDLCALC 120 07/24/2013    LDLCHOLESTEROL 89 02/11/2016       Assessment and Plan:    1. Essential hypertension  -Continue Lopressor, hydrochlorothiazide, lisinopril, blood pain. Will start hydralazine 10 mg 3 times daily. We will also refer patient to see cardiology. - Lipid Panel; Future  - [facility] - [facility], [place], , Cardiology, [place]  - hydroCHLOROthiazide (HYDRODIURIL) 25 MG tablet; take 1 tablet by mouth once daily  Dispense: 90 tablet; Refill: 1  - hydrALAZINE (APRESOLINE) 10 MG tablet; Take 1 tablet by mouth 3 times daily  Dispense: 90 tablet; Refill: 1    2. Type 2 diabetes mellitus without complication, without long-term current use of insulin (HCC)  -Continue glipizide 10 mg extended twice daily, Metformin 1000 mg twice daily. \"For patient to diabetes education and consult outpatient pharmacy to discuss further options.   Patient's A1c remains elevated in 1 month despite interventions, will consider starting Lantus.  - POCT glycosylated hemoglobin (Hb A1C)  - Microalbumin, Ur; Future  - [address] Diabetes Education - [facility]          Requested Prescriptions     Signed Prescriptions Disp Refills    hydroCHLOROthiazide (HYDRODIURIL) 25 MG tablet 90 tablet 1     Sig: take 1 tablet by mouth once daily    hydrALAZINE (APRESOLINE) 10 MG tablet 90 tablet 1     Sig: Take 1 tablet by mouth 3 times daily       Medications Discontinued During This Encounter   Medication Reason    hydroCHLOROthiazide (HYDRODIURIL) 25 MG tablet REORDER    hydroCHLOROthiazide (HYDRODIURIL) 25 MG tablet DOSE ADJUSTMENT    hydrALAZINE (APRESOLINE) 25 MG tablet DOSE ADJUSTMENT       Weston received counseling on the following healthy behaviors: nutrition, exercise and medication adherence    Discussed use,benefit, and side effects of prescribed medications. Barriers to medication compliance addressed. All patient questions answered. Pt voiced understanding. Return in about 4 weeks (around 2/8/2021) for Diabetes, HTN GENO. Disclaimer: Some orall of this note was transcribed using voice-recognition software. This may cause typographical errors occasionally. Although all effort is made to fix these errors, please do not hesitate to contact our office if there Odell Bolds concern with the understanding of this note.

## 2021-01-11 NOTE — PROGRESS NOTES
Attending Physician Statement  I have discussed the care of Elmer Velazquez 64 y.o. male, including pertinent history and exam findings, with the resident Dr. Christ Sierra MD.    History and Exam:   Chief Complaint   Patient presents with    Diabetes    Health Maintenance     pt refuse flu     Past Medical History:   Diagnosis Date    Arthritis     general    Cerebral artery occlusion with cerebral infarction (HCC)     mild    Chronic back pain     Elbow pain, chronic     Left    Full dentures     Fungus infection     bilat. feet    Hyperlipidemia     Hypertension     Thoracic aortic aneurysm diagnosed  2010 at [facility] Type II or unspecified type diabetes mellitus without mention of complication, not stated as uncontrolled      No Known Allergies   I have seen and examined the patient and the key elements of the encounter have been performed by me. BP Readings from Last 3 Encounters:   01/11/21 (!) 158/89   06/12/20 (!) 143/81   06/04/20 (!) 189/96     BP (!) 158/89   Pulse 69   Temp 97.6 °F (36.4 °C) (Temporal)   Ht 5' 8\" (1.727 m)   Wt 190 lb (86.2 kg)   BMI 28.89 kg/m²   Lab Results   Component Value Date    WBC 7.4 02/24/2018    HGB 11.5 (L) 02/24/2018    HCT 37.7 (L) 02/24/2018     02/24/2018    CHOL 160 02/11/2016    TRIG 125 02/11/2016    HDL 46 02/11/2016    ALT 11 02/11/2016    AST 19 02/11/2016     02/24/2018    K 4.5 02/24/2018    CL 96 (L) 02/24/2018    CREATININE 0.85 02/24/2018    BUN 12 02/24/2018    CO2 27 02/24/2018    LABA1C 8.8 07/09/2019    LABMICR 274 02/11/2016     Lab Results   Component Value Date    LABALBU 4.4 02/11/2016     No results found for: IRON, TIBC, FERRITIN  Lab Results   Component Value Date    LDLCALC 120 07/24/2013    LDLCHOLESTEROL 89 02/11/2016     I agree with the assessment, plan and the diagnosis of    Diagnosis Orders   1. Screening for hyperlipidemia  Lipid Panel   2.  Essential hypertension  hydroCHLOROthiazide (HYDRODIURIL) 25 MG tablet    . I agree with orders as documented by the resident. More than 25 minutes spent  in face to face encounter with the patient and more than half in counseling. Patient's questions were answered. Patient Voiced understanding to the counseling. No follow-ups on file.    (GC Modifier)-Dr. Dali Power MD

## 2021-01-11 NOTE — PROGRESS NOTES
Visit Information    Have you changed or started any medications since your last visit including any over-the-counter medicines, vitamins, or herbal medicines? no   Have you stopped taking any of your medications? Is so, why? -  no  Are you having any side effects from any of your medications? - no    Have you seen any other physician or provider since your last visit?  no   Have you had any other diagnostic tests since your last visit?  no   Have you been seen in the emergency room and/or had an admission in a hospital since we last saw you?  no   Have you had your routine dental cleaning in the past 6 months?  no     Do you have an active MyChart account? If no, what is the barrier?   Yes    Patient Care Team:  Sandoval Conroy MD as PCP - General (Family Medicine)    Medical History Review  Past Medical, Family, and Social History reviewed and does not contribute to the patient presenting condition    Health Maintenance   Topic Date Due    Diabetic retinal exam  03/30/2016    Lipid screen  02/11/2017    Diabetic foot exam  09/18/2018    Diabetic microalbuminuria test  09/18/2018    Potassium monitoring  02/24/2019    Creatinine monitoring  02/24/2019    A1C test (Diabetic or Prediabetic)  07/09/2020    Flu vaccine (1) 09/01/2020    Annual Wellness Visit (AWV)  01/08/2021    Hepatitis B vaccine (1 of 3 - Risk 3-dose series) 06/12/2021 (Originally [birth year]27/1981)    Shingles Vaccine (1 of 2) 06/12/2021 (Originally 4/27/2012)    Colon cancer screen colonoscopy  08/25/2024    DTaP/Tdap/Td vaccine (2 - Td) 06/16/2027    Pneumococcal 0-64 years Vaccine  Completed    Hepatitis C screen  Addressed    HIV screen  Addressed    Hepatitis A vaccine  Aged Out    Hib vaccine  Aged Out    Meningococcal (ACWY) vaccine  Aged Out

## 2021-01-20 RX ORDER — METOPROLOL TARTRATE 100 MG/1
TABLET ORAL
Qty: 240 TABLET | Refills: 1 | Status: SHIPPED | OUTPATIENT
Start: 2021-01-20 | End: 2021-08-06

## 2021-02-15 NOTE — TELEPHONE ENCOUNTER
Sav Request for pending medication. Last Visit Date: 1/11/21  Next Visit Date:  No future appointments. Health Maintenance   Topic Date Due    COVID-19 Vaccine (1 of 2) 04/27/1978    Diabetic retinal exam  03/30/2016    Lipid screen  02/11/2017    Diabetic foot exam  09/18/2018    Diabetic microalbuminuria test  09/18/2018    Potassium monitoring  02/24/2019    Creatinine monitoring  02/24/2019    Flu vaccine (1) 09/01/2020    Annual Wellness Visit (AWV)  01/08/2021    Hepatitis B vaccine (1 of 3 - Risk 3-dose series) 06/12/2021 (Originally [birth year]27[birth year]1981)    Shingles Vaccine (1 of 2) 06/12/2021 (Originally 4/27/2012)    A1C test (Diabetic or Prediabetic)  04/11/2021    Colon cancer screen colonoscopy  08/25/2024    DTaP/Tdap/Td vaccine (2 - Td) 06/16/2027    Pneumococcal 0-64 years Vaccine  Completed    Hepatitis C screen  Addressed    HIV screen  Addressed    Hepatitis A vaccine  Aged Out    Hib vaccine  Aged Out    Meningococcal (ACWY) vaccine  Aged Out       Hemoglobin A1C (%)   Date Value   01/11/2021 103   07/09/2019 8.8   05/17/2018 8.2             ( goal A1C is < 7)   Microalb/Crt. Ratio (mcg/mg creat)   Date Value   02/11/2016 274     LDL Cholesterol (mg/dL)   Date Value   02/11/2016 89     LDL Calculated (mg/dL)   Date Value   07/24/2013 120       (goal LDL is <100)   AST (U/L)   Date Value   02/11/2016 19     ALT (U/L)   Date Value   02/11/2016 11     BUN (mg/dL)   Date Value   02/24/2018 12     BP Readings from Last 3 Encounters:   01/11/21 (!) 158/89   06/12/20 (!) 143/81   06/04/20 (!) 189/96          (goal 120/80)    All Future Testing planned in [facility]  Lab Frequency Next Occurrence   Basic Metabolic Panel Once 01/35/4065   Microalbumin, Ur Once 06/12/2021   Lipid Panel Once 06/12/2021   Microalbumin, Ur Once 02/10/2021   Lipid Panel Once 01/11/2022       Next Visit Date:  No future appointments.       Patient Active Problem List:     HLD (hyperlipidemia)     HTN (hypertension)     DM (diabetes mellitus) (HCC)     Chronic back pain     Thoracic aortic aneurysm diagnosed  2010?  at [facility]     Acute pain of right shoulder     Other male erectile dysfunction

## 2021-03-11 DIAGNOSIS — I10 ESSENTIAL HYPERTENSION: ICD-10-CM

## 2021-03-11 RX ORDER — HYDRALAZINE HYDROCHLORIDE 10 MG/1
TABLET, FILM COATED ORAL
Qty: 180 TABLET | Refills: 1 | Status: SHIPPED | OUTPATIENT
Start: 2021-03-11 | End: 2021-06-04 | Stop reason: SDUPTHER

## 2021-03-11 NOTE — TELEPHONE ENCOUNTER
E-scribe request for hydralazine . Please review and e-scribe if applicable. Last Visit Date: 01/11/2021  Next Visit Date:  Visit date not found    Hemoglobin A1C (%)   Date Value   01/11/2021 103   07/09/2019 8.8   05/17/2018 8.2             ( goal A1C is < 7)   Microalb/Crt. Ratio (mcg/mg creat)   Date Value   02/11/2016 274     LDL Cholesterol (mg/dL)   Date Value   02/11/2016 89     LDL Calculated (mg/dL)   Date Value   07/24/2013 120       (goal LDL is <100)   AST (U/L)   Date Value   02/11/2016 19     ALT (U/L)   Date Value   02/11/2016 11     BUN (mg/dL)   Date Value   02/24/2018 12     BP Readings from Last 3 Encounters:   01/11/21 (!) 158/89   06/12/20 (!) 143/81   06/04/20 (!) 189/96          (goal 120/80)        Patient Active Problem List:     HLD (hyperlipidemia)     HTN (hypertension)     DM (diabetes mellitus) (HCC)     Chronic back pain     Thoracic aortic aneurysm diagnosed  2010?  at [facility]     Acute pain of right shoulder     Other male erectile dysfunction

## 2021-03-12 RX ORDER — LISINOPRIL 40 MG/1
TABLET ORAL
Qty: 120 TABLET | Refills: 0 | Status: SHIPPED | OUTPATIENT
Start: 2021-03-12 | End: 2021-06-04 | Stop reason: SDUPTHER

## 2021-03-12 NOTE — TELEPHONE ENCOUNTER
Lisinopril pending for refill     Health Maintenance   Topic Date Due    COVID-19 Vaccine (1) Never done    Diabetic retinal exam  03/30/2016    Lipid screen  02/11/2017    Diabetic foot exam  09/18/2018    Diabetic microalbuminuria test  09/18/2018    Potassium monitoring  02/24/2019    Creatinine monitoring  02/24/2019    Flu vaccine (1) Never done   [employer] Visit (AWV)  Never done    Hepatitis B vaccine (1 of 3 - Risk 3-dose series) 06/12/2021 (Originally [birth year]27/1979)    Shingles Vaccine (1 of 2) 06/12/2021 (Originally 4/27/2012)    A1C test (Diabetic or Prediabetic)  04/11/2021    Colon cancer screen colonoscopy  08/25/2024    DTaP/Tdap/Td vaccine (2 - Td) 06/16/2027    Pneumococcal 0-64 years Vaccine  Completed    Hepatitis C screen  Addressed    HIV screen  Addressed    Hepatitis A vaccine  Aged Out    Hib vaccine  Aged Out    Meningococcal (ACWY) vaccine  Aged Out             (applicable per patient's age: Cancer Screenings, Depression Screening, Fall Risk Screening, Immunizations)    Hemoglobin A1C (%)   Date Value   01/11/2021 103   07/09/2019 8.8   05/17/2018 8.2     Microalb/Crt. Ratio (mcg/mg creat)   Date Value   02/11/2016 274     LDL Cholesterol (mg/dL)   Date Value   02/11/2016 89     LDL Calculated (mg/dL)   Date Value   07/24/2013 120     AST (U/L)   Date Value   02/11/2016 19     ALT (U/L)   Date Value   02/11/2016 11     BUN (mg/dL)   Date Value   02/24/2018 12      (goal A1C is < 7)   (goal LDL is <100) need 30-50% reduction from baseline     BP Readings from Last 3 Encounters:   01/11/21 (!) 158/89   06/12/20 (!) 143/81   06/04/20 (!) 189/96    (goal /80)      All Future Testing planned in [facility]:  Lab Frequency Next Occurrence   Basic Metabolic Panel Once 96/08/3352   Microalbumin, Ur Once 06/12/2021   Lipid Panel Once 06/12/2021   Microalbumin, Ur Once 01/11/2022   Lipid Panel Once 01/11/2022       Next Visit Date:  No future appointments.          Patient Active Problem List:     HLD (hyperlipidemia)     HTN (hypertension)     DM (diabetes mellitus) ([facility] [address]     Chronic back pain     Thoracic aortic aneurysm diagnosed  2010?  at [facility]     Acute pain of right shoulder     Other male erectile dysfunction

## 2021-04-07 RX ORDER — GLIPIZIDE 10 MG/1
TABLET, FILM COATED, EXTENDED RELEASE ORAL
Qty: 60 TABLET | Refills: 4 | Status: SHIPPED | OUTPATIENT
Start: 2021-04-07 | End: 2021-08-06

## 2021-04-10 DIAGNOSIS — Z86.73 HISTORY OF STROKE: ICD-10-CM

## 2021-04-12 RX ORDER — ATORVASTATIN CALCIUM 80 MG/1
80 TABLET, FILM COATED ORAL DAILY
Qty: 90 TABLET | Refills: 1 | Status: SHIPPED | OUTPATIENT
Start: 2021-04-12 | End: 2021-07-26

## 2021-04-12 NOTE — TELEPHONE ENCOUNTER
E-scribe request for atorvastatin. Please review and e-scribe if applicable. Last Visit Date: 01/11/2021  Next Visit Date:  5/25/2021    Hemoglobin A1C (%)   Date Value   01/11/2021 103   07/09/2019 8.8   05/17/2018 8.2             ( goal A1C is < 7)   Microalb/Crt. Ratio (mcg/mg creat)   Date Value   02/11/2016 274     LDL Cholesterol (mg/dL)   Date Value   02/11/2016 89     LDL Calculated (mg/dL)   Date Value   07/24/2013 120       (goal LDL is <100)   AST (U/L)   Date Value   02/11/2016 19     ALT (U/L)   Date Value   02/11/2016 11     BUN (mg/dL)   Date Value   02/24/2018 12     BP Readings from Last 3 Encounters:   01/11/21 (!) 158/89   06/12/20 (!) 143/81   06/04/20 (!) 189/96          (goal 120/80)        Patient Active Problem List:     HLD (hyperlipidemia)     HTN (hypertension)     DM (diabetes mellitus) (HCC)     Chronic back pain     Thoracic aortic aneurysm diagnosed  2010?  at [facility]     Acute pain of right shoulder     Other male erectile dysfunction

## 2021-05-03 ENCOUNTER — TELEPHONE (OUTPATIENT)
Dept: FAMILY MEDICINE CLINIC | Age: 61
End: 2021-05-03

## 2021-05-03 ENCOUNTER — OFFICE VISIT (OUTPATIENT)
Dept: FAMILY MEDICINE CLINIC | Age: 61
End: 2021-05-03
Payer: MEDICARE

## 2021-05-03 VITALS
HEART RATE: 69 BPM | DIASTOLIC BLOOD PRESSURE: 78 MMHG | WEIGHT: 189.6 LBS | SYSTOLIC BLOOD PRESSURE: 132 MMHG | BODY MASS INDEX: 28.83 KG/M2

## 2021-05-03 DIAGNOSIS — I71.20 THORACIC AORTIC ANEURYSM WITHOUT RUPTURE: ICD-10-CM

## 2021-05-03 DIAGNOSIS — E11.9 TYPE 2 DIABETES MELLITUS WITHOUT COMPLICATION, WITHOUT LONG-TERM CURRENT USE OF INSULIN (HCC): Primary | ICD-10-CM

## 2021-05-03 DIAGNOSIS — Z86.73 HISTORY OF STROKE: ICD-10-CM

## 2021-05-03 DIAGNOSIS — I10 ESSENTIAL HYPERTENSION: ICD-10-CM

## 2021-05-03 LAB — HBA1C MFR BLD: 8.8 %

## 2021-05-03 PROCEDURE — 99213 OFFICE O/P EST LOW 20 MIN: CPT

## 2021-05-03 PROCEDURE — 3052F HG A1C>EQUAL 8.0%<EQUAL 9.0%: CPT

## 2021-05-03 PROCEDURE — 83036 HEMOGLOBIN GLYCOSYLATED A1C: CPT

## 2021-05-03 RX ORDER — CLOPIDOGREL BISULFATE 75 MG/1
TABLET ORAL
Qty: 90 TABLET | Refills: 1 | OUTPATIENT
Start: 2021-05-03

## 2021-05-03 ASSESSMENT — ENCOUNTER SYMPTOMS
CONSTIPATION: 0
PHOTOPHOBIA: 0
COUGH: 0
NAUSEA: 0
COLOR CHANGE: 0
WHEEZING: 0
ABDOMINAL DISTENTION: 0
SHORTNESS OF BREATH: 0
APNEA: 0
VOMITING: 0
DIARRHEA: 0

## 2021-05-03 NOTE — PROGRESS NOTES
confusion and decreased concentration. The patient has a   Family History   Problem Relation Age of Onset    High Blood Pressure Mother     Diabetes Mother     Cancer Father     Heart Disease Father     Stroke Brother     Cirrhosis Brother        Objective:    /78 (Site: Right Upper Arm, Position: Sitting, Cuff Size: Medium Adult)   Pulse 69   Wt 189 lb 9.6 oz (86 kg)   BMI 28.83 kg/m²    BP Readings from Last 3 Encounters:   05/03/21 132/78   01/11/21 (!) 158/89   06/12/20 (!) 143/81       Physical Exam  Constitutional:       General: He is not in acute distress. Appearance: Normal appearance. He is not ill-appearing, toxic-appearing or diaphoretic. HENT:      Head: Normocephalic and atraumatic. Nose: Nose normal. No congestion. Eyes:      General: No scleral icterus. Right eye: No discharge. Left eye: No discharge. Extraocular Movements: Extraocular movements intact. Conjunctiva/sclera: Conjunctivae normal.      Pupils: Pupils are equal, round, and reactive to light. Cardiovascular:      Rate and Rhythm: Normal rate and regular rhythm. Pulses: Normal pulses. Heart sounds: No murmur. Pulmonary:      Effort: Pulmonary effort is normal.      Breath sounds: Normal breath sounds. No wheezing. Abdominal:      General: There is no distension. Tenderness: There is no abdominal tenderness. Musculoskeletal: Normal range of motion. General: No swelling or tenderness. Right lower leg: No edema. Left lower leg: No edema. Skin:     General: Skin is warm. Coloration: Skin is not jaundiced. Findings: No erythema. Neurological:      Mental Status: He is alert and oriented to person, place, and time. Motor: No weakness. Psychiatric:         Mood and Affect: Mood normal.         Behavior: Behavior normal.         Thought Content:  Thought content normal.         Judgment: Judgment normal.         Lab Results   Component Value Date    WBC 7.4 02/24/2018    HGB 11.5 (L) 02/24/2018    HCT 37.7 (L) 02/24/2018     02/24/2018    CHOL 160 02/11/2016    TRIG 125 02/11/2016    HDL 46 02/11/2016    ALT 11 02/11/2016    AST 19 02/11/2016     02/24/2018    K 4.5 02/24/2018    CL 96 (L) 02/24/2018    CREATININE 0.85 02/24/2018    BUN 12 02/24/2018    CO2 27 02/24/2018    LABA1C 8.8 05/03/2021    LABMICR 274 02/11/2016     Lab Results   Component Value Date    CALCIUM 9.6 02/24/2018     Lab Results   Component Value Date    LDLCALC 120 07/24/2013    LDLCHOLESTEROL 89 02/11/2016       Assessment and Plan:    1. Type 2 diabetes mellitus without complication, without long-term current use of insulin (HCC)  - POCT glycosylated hemoglobin (Hb A1C) 8.8 today  -Never been on Insulin  No symptoms reported, no neuropathy, polyuria, polyphagia, chest pain, shortness of breath, dizziness reported. HBa1c 8.8  1000 Metformin BID  Glipizde 10 mg daily   - KRISS - Martha Carter MD, Ophthalmology, [facility] Prescriptions      No prescriptions requested or ordered in this encounter       There are no discontinued medications. Weston received counseling on the following healthy behaviors: nutrition, exercise and medication adherence    Discussed use,benefit, and side effects of prescribed medications. Barriers to medication compliance addressed. All patient questions answered. Pt voiced understanding. Return in about 3 months (around 8/3/2021) for HbA1c. Disclaimer: Some orall of this note was transcribed using voice-recognition software. This may cause typographical errors occasionally. Although all effort is made to fix these errors, please do not hesitate to contact our office if there Jennifer Mage concern with the understanding of this note.

## 2021-05-03 NOTE — PROGRESS NOTES
Diabetic visit information    BP Readings from Last 3 Encounters:   01/11/21 (!) 158/89   06/12/20 (!) 143/81   06/04/20 (!) 189/96       Hemoglobin A1C (%)   Date Value   01/11/2021 103   07/09/2019 8.8   05/17/2018 8.2     Microalb/Crt. Ratio (mcg/mg creat)   Date Value   02/11/2016 274     LDL Cholesterol (mg/dL)   Date Value   02/11/2016 89     LDL Calculated (mg/dL)   Date Value   07/24/2013 120               Have you changed or started any medications since your last visit including any over-the-counter medicines, vitamins, or herbal medicines? no   Have you stopped taking any of your medications? Is so, why? -  no  Are you having any side effects from any of your medications? - no    Have you seen any other physician or provider since your last visit?  no   Have you had any other diagnostic tests since your last visit?  no   Have you been seen in the emergency room and/or had an admission in a hospital since we last saw you?  no     Have you had your annual diabetic retinal (eye) exam? No   (ensure copy of exam is in the chart)    Have you had your routine dental cleaning in the past 6 months? no    Do you have an active MyChart account? If not, what are your barriers? No:    Patient Care Team:  Darnell Haji MD as PCP - General (Family Medicine)    Medical history Review  Past Medical, Family, and Social History reviewed and does not contribute to the patient presenting condition.     Health Maintenance   Topic Date Due    COVID-19 Vaccine (1) Never done    Diabetic retinal exam  03/30/2016    Lipid screen  02/11/2017    Diabetic foot exam  09/18/2018    Diabetic microalbuminuria test  09/18/2018    Potassium monitoring  02/24/2019    Creatinine monitoring  02/24/2019    Annual Wellness Visit (AWV)  Never done    A1C test (Diabetic or Prediabetic)  04/11/2021    Hepatitis B vaccine (1 of 3 - Risk 3-dose series) 06/12/2021 (Originally [birth year]27/1979)    Shingles Vaccine (1 of 2) 06/12/2021 (Originally 4/27/2012)    Flu vaccine (Season Ended) 09/01/2021    Colon cancer screen colonoscopy  08/25/2024    DTaP/Tdap/Td vaccine (2 - Td) 06/16/2027    Pneumococcal 0-64 years Vaccine  Completed    Hepatitis C screen  Addressed    HIV screen  Addressed    Hepatitis A vaccine  Aged Out    Hib vaccine  Aged Out    Meningococcal (ACWY) vaccine  Aged Out

## 2021-05-03 NOTE — TELEPHONE ENCOUNTER
Last visit: 01/11/21  Last Med refill:   Does patient have enough medication for 72 hours:     Next Visit Date:  Future Appointments   Date Time Provider Cuca Daviesi   5/3/2021  1:45 PM Bernice Tao MD [address] [place] [address] Maintenance   Topic Date Due    COVID-19 Vaccine (1) Never done    Diabetic retinal exam  03/30/2016    Lipid screen  02/11/2017    Diabetic foot exam  09/18/2018    Diabetic microalbuminuria test  09/18/2018    Potassium monitoring  02/24/2019    Creatinine monitoring  02/24/2019    Annual Wellness Visit (AWV)  Never done    A1C test (Diabetic or Prediabetic)  04/11/2021    Hepatitis B vaccine (1 of 3 - Risk 3-dose series) 06/12/2021 (Originally [birth year]27/1979)    Shingles Vaccine (1 of 2) 06/12/2021 (Originally 4/27/2012)    Flu vaccine (Season Ended) 09/01/2021    Colon cancer screen colonoscopy  08/25/2024    DTaP/Tdap/Td vaccine (2 - Td) 06/16/2027    Pneumococcal 0-64 years Vaccine  Completed    Hepatitis C screen  Addressed    HIV screen  Addressed    Hepatitis A vaccine  Aged Out    Hib vaccine  Aged Out    Meningococcal (ACWY) vaccine  Aged Out       Hemoglobin A1C (%)   Date Value   01/11/2021 103   07/09/2019 8.8   05/17/2018 8.2             ( goal A1C is < 7)   Microalb/Crt.  Ratio (mcg/mg creat)   Date Value   02/11/2016 274     LDL Cholesterol (mg/dL)   Date Value   02/11/2016 89     LDL Calculated (mg/dL)   Date Value   07/24/2013 120       (goal LDL is <100)   AST (U/L)   Date Value   02/11/2016 19     ALT (U/L)   Date Value   02/11/2016 11     BUN (mg/dL)   Date Value   02/24/2018 12     BP Readings from Last 3 Encounters:   01/11/21 (!) 158/89   06/12/20 (!) 143/81   06/04/20 (!) 189/96          (goal 120/80)    All Future Testing planned in [facility]  Lab Frequency Next Occurrence   Basic Metabolic Panel Once 40/01/5006   Microalbumin, Ur Once 06/12/2021   Lipid Panel Once 06/12/2021   Microalbumin, Ur Once 01/11/2022   Lipid Panel Once 01/11/2022 Patient Active Problem List:     HLD (hyperlipidemia)     HTN (hypertension)     DM (diabetes mellitus) ([facility] [address]     Chronic back pain     Thoracic aortic aneurysm diagnosed  2010?  at [facility]     Acute pain of right shoulder     Other male erectile dysfunction

## 2021-05-03 NOTE — PROGRESS NOTES
I have reviewed and discussed key elements of [address] [place] [address] with the resident including plan of care and follow up and agree with the care bina plan.

## 2021-05-04 ENCOUNTER — TELEPHONE (OUTPATIENT)
Dept: FAMILY MEDICINE CLINIC | Age: 61
End: 2021-05-04

## 2021-05-04 DIAGNOSIS — I10 ESSENTIAL HYPERTENSION: ICD-10-CM

## 2021-05-04 RX ORDER — HYDROCHLOROTHIAZIDE 25 MG/1
TABLET ORAL
Qty: 90 TABLET | Refills: 1 | Status: SHIPPED | OUTPATIENT
Start: 2021-05-04 | End: 2021-07-19 | Stop reason: SDUPTHER

## 2021-05-04 NOTE — TELEPHONE ENCOUNTER
[facility] calling for a refill on hydrochlorothiazide and Plopidogreo (not on med list)       Hydrochlorothiazide pending- could not find plopidogreo (spelled by pharmacy)             Health Maintenance   Topic Date Due    COVID-19 Vaccine (1) Never done    Diabetic retinal exam  03/30/2016    Lipid screen  02/11/2017    Diabetic foot exam  09/18/2018    Diabetic microalbuminuria test  09/18/2018    Potassium monitoring  02/24/2019    Creatinine monitoring  02/24/2019    Annual Wellness Visit (AWV)  Never done    Hepatitis B vaccine (1 of 3 - Risk 3-dose series) 06/12/2021 (Originally 4/27/1979)    Shingles Vaccine (1 of 2) 06/12/2021 (Originally 4/27/2012)    Flu vaccine (Season Ended) 09/01/2021    A1C test (Diabetic or Prediabetic)  05/03/2022    Colon cancer screen colonoscopy  08/25/2024    DTaP/Tdap/Td vaccine (2 - Td) 06/16/2027    Pneumococcal 0-64 years Vaccine  Completed    Hepatitis C screen  Addressed    HIV screen  Addressed    Hepatitis A vaccine  Aged Out    Hib vaccine  Aged Out    Meningococcal (ACWY) vaccine  Aged Out             (applicable per patient's age: Cancer Screenings, Depression Screening, Fall Risk Screening, Immunizations)    Hemoglobin A1C (%)   Date Value   05/03/2021 8.8   01/11/2021 103   07/09/2019 8.8     Microalb/Crt.  Ratio (mcg/mg creat)   Date Value   02/11/2016 274     LDL Cholesterol (mg/dL)   Date Value   02/11/2016 89     LDL Calculated (mg/dL)   Date Value   07/24/2013 120     AST (U/L)   Date Value   02/11/2016 19     ALT (U/L)   Date Value   02/11/2016 11     BUN (mg/dL)   Date Value   02/24/2018 12      (goal A1C is < 7)   (goal LDL is <100) need 30-50% reduction from baseline     BP Readings from Last 3 Encounters:   05/03/21 132/78   01/11/21 (!) 158/89   06/12/20 (!) 143/81    (goal /80)      All Future Testing planned in [facility]:  Lab Frequency Next Occurrence   Basic Metabolic Panel Once 48/54/3088   Microalbumin, Ur Once 06/12/2021 Lipid Panel Once 06/12/2021   Microalbumin, Ur Once 01/11/2022   Lipid Panel Once 01/11/2022       Next Visit Date:  No future appointments. Patient Active Problem List:     HLD (hyperlipidemia)     Essential hypertension     DM (diabetes mellitus) ([facility] [address]     Chronic back pain     Thoracic aortic aneurysm diagnosed  2010?  at [facility]     Acute pain of right shoulder     Other male erectile dysfunction

## 2021-06-04 DIAGNOSIS — I10 ESSENTIAL HYPERTENSION: ICD-10-CM

## 2021-06-04 RX ORDER — LISINOPRIL 40 MG/1
TABLET ORAL
Qty: 120 TABLET | Refills: 0 | Status: SHIPPED | OUTPATIENT
Start: 2021-06-04 | End: 2021-10-21 | Stop reason: SDUPTHER

## 2021-06-04 RX ORDER — HYDRALAZINE HYDROCHLORIDE 10 MG/1
TABLET, FILM COATED ORAL
Qty: 180 TABLET | Refills: 1 | Status: SHIPPED | OUTPATIENT
Start: 2021-06-04 | End: 2021-10-21 | Stop reason: SDUPTHER

## 2021-06-04 NOTE — TELEPHONE ENCOUNTER
Escribe Request for pending medications. Last Visit Date: 5/3/21  Next Visit Date:  No future appointments. Health Maintenance   Topic Date Due    COVID-19 Vaccine (1) Never done    Diabetic retinal exam  03/30/2016    Lipid screen  02/11/2017    Diabetic foot exam  09/18/2018    Diabetic microalbuminuria test  09/18/2018    Potassium monitoring  02/24/2019    Creatinine monitoring  02/24/2019    Annual Wellness Visit (AWV)  Never done    Hepatitis B vaccine (1 of 3 - Risk 3-dose series) 06/12/2021 (Originally 4/27/1979)    Shingles Vaccine (1 of 2) 06/12/2021 (Originally 4/27/2012)    Flu vaccine (Season Ended) 09/01/2021    A1C test (Diabetic or Prediabetic)  05/03/2022    Colon cancer screen colonoscopy  08/25/2024    Pneumococcal 0-64 years Vaccine (2 of 2) 04/27/2027    DTaP/Tdap/Td vaccine (2 - Td or Tdap) 06/16/2027    Hepatitis C screen  Addressed    HIV screen  Addressed    Hepatitis A vaccine  Aged Out    Hib vaccine  Aged Out    Meningococcal (ACWY) vaccine  Aged Out       Hemoglobin A1C (%)   Date Value   05/03/2021 8.8   01/11/2021 103   07/09/2019 8.8             ( goal A1C is < 7)   Microalb/Crt. Ratio (mcg/mg creat)   Date Value   02/11/2016 274     LDL Cholesterol (mg/dL)   Date Value   02/11/2016 89     LDL Calculated (mg/dL)   Date Value   07/24/2013 120       (goal LDL is <100)   AST (U/L)   Date Value   02/11/2016 19     ALT (U/L)   Date Value   02/11/2016 11     BUN (mg/dL)   Date Value   02/24/2018 12     BP Readings from Last 3 Encounters:   05/03/21 132/78   01/11/21 (!) 158/89   06/12/20 (!) 143/81          (goal 120/80)    All Future Testing planned in [facility]  Lab Frequency Next Occurrence   Basic Metabolic Panel Once 19/83/9064   Microalbumin, Ur Once 06/12/2021   Lipid Panel Once 06/12/2021   Microalbumin, Ur Once 01/11/2022   Lipid Panel Once 01/11/2022       Next Visit Date:  No future appointments.       Patient Active Problem List:     HLD (hyperlipidemia)     Essential hypertension     DM (diabetes mellitus) (HCC)     Chronic back pain     Thoracic aortic aneurysm diagnosed  2010?  at [facility]     Acute pain of right shoulder     Other male erectile dysfunction

## 2021-06-16 DIAGNOSIS — Z86.73 HISTORY OF STROKE: ICD-10-CM

## 2021-06-16 RX ORDER — CLOPIDOGREL BISULFATE 75 MG/1
TABLET ORAL
Qty: 30 TABLET | Refills: 0 | Status: SHIPPED | OUTPATIENT
Start: 2021-06-16 | End: 2021-08-06

## 2021-06-16 NOTE — TELEPHONE ENCOUNTER
Last visit:  5-3-21  Last Med refill:  12-11-20  Does patient have enough medication for 72 hours: No     Next Visit Date:  No future appointments. Health Maintenance   Topic Date Due    COVID-19 Vaccine (1) Never done    Hepatitis B vaccine (1 of 3 - Risk 3-dose series) Never done    Shingles Vaccine (1 of 2) Never done    Diabetic retinal exam  03/30/2016    Lipid screen  02/11/2017    Diabetic foot exam  09/18/2018    Diabetic microalbuminuria test  09/18/2018    Potassium monitoring  02/24/2019    Creatinine monitoring  02/24/2019    Annual Wellness Visit (AWV)  Never done    Flu vaccine (Season Ended) 09/01/2021    A1C test (Diabetic or Prediabetic)  05/03/2022    Colon cancer screen colonoscopy  08/25/2024    Pneumococcal 0-64 years Vaccine (2 of 2) 04/27/2027    DTaP/Tdap/Td vaccine (2 - Td or Tdap) 06/16/2027    Hepatitis C screen  Addressed    HIV screen  Addressed    Hepatitis A vaccine  Aged Out    Hib vaccine  Aged Out    Meningococcal (ACWY) vaccine  Aged Out       Hemoglobin A1C (%)   Date Value   05/03/2021 8.8   01/11/2021 103   07/09/2019 8.8             ( goal A1C is < 7)   Microalb/Crt. Ratio (mcg/mg creat)   Date Value   02/11/2016 274     LDL Cholesterol (mg/dL)   Date Value   02/11/2016 89     LDL Calculated (mg/dL)   Date Value   07/24/2013 120       (goal LDL is <100)   AST (U/L)   Date Value   02/11/2016 19     ALT (U/L)   Date Value   02/11/2016 11     BUN (mg/dL)   Date Value   02/24/2018 12     BP Readings from Last 3 Encounters:   05/03/21 132/78   01/11/21 (!) 158/89   06/12/20 (!) 143/81          (goal 120/80)    All Future Testing planned in [facility]  Lab Frequency Next Occurrence   Microalbumin, Ur Once 01/11/2022   Lipid Panel Once 01/11/2022               Patient Active Problem List:     HLD (hyperlipidemia)     Essential hypertension     DM (diabetes mellitus) ([address]     Chronic back pain     Thoracic aortic aneurysm diagnosed  2010?  at [facility]

## 2021-06-25 ENCOUNTER — TELEPHONE (OUTPATIENT)
Dept: FAMILY MEDICINE CLINIC | Age: 61
End: 2021-06-25

## 2021-06-25 NOTE — TELEPHONE ENCOUNTER
E-scribe request for metformin. Please review and e-scribe if applicable. Last Visit Date: 6/25/2021  Next Visit Date:  Visit date not found    Hemoglobin A1C (%)   Date Value   05/03/2021 8.8   01/11/2021 103   07/09/2019 8.8             ( goal A1C is < 7)   Microalb/Crt. Ratio (mcg/mg creat)   Date Value   02/11/2016 274     LDL Cholesterol (mg/dL)   Date Value   02/11/2016 89     LDL Calculated (mg/dL)   Date Value   07/24/2013 120       (goal LDL is <100)   AST (U/L)   Date Value   02/11/2016 19     ALT (U/L)   Date Value   02/11/2016 11     BUN (mg/dL)   Date Value   02/24/2018 12     BP Readings from Last 3 Encounters:   05/03/21 132/78   01/11/21 (!) 158/89   06/12/20 (!) 143/81          (goal 120/80)        Patient Active Problem List:     HLD (hyperlipidemia)     Essential hypertension     DM (diabetes mellitus) (HCC)     Chronic back pain     Thoracic aortic aneurysm diagnosed  2010?  at [facility]     Acute pain of right shoulder     Other male erectile dysfunction

## 2021-07-01 DIAGNOSIS — M25.511 ACUTE PAIN OF RIGHT SHOULDER: ICD-10-CM

## 2021-07-01 DIAGNOSIS — Z86.73 HISTORY OF STROKE: ICD-10-CM

## 2021-07-01 RX ORDER — GLIPIZIDE 10 MG/1
TABLET, FILM COATED, EXTENDED RELEASE ORAL
Qty: 60 TABLET | Refills: 4 | OUTPATIENT
Start: 2021-07-01

## 2021-07-01 RX ORDER — CLOPIDOGREL BISULFATE 75 MG/1
TABLET ORAL
Qty: 30 TABLET | Refills: 0 | OUTPATIENT
Start: 2021-07-01

## 2021-07-01 RX ORDER — IBUPROFEN 800 MG/1
TABLET ORAL
Qty: 120 TABLET | Refills: 3 | OUTPATIENT
Start: 2021-07-01

## 2021-07-01 NOTE — TELEPHONE ENCOUNTER
Most of the patient medication cannot be prescribed per system, writer tried patient needs an appointment to refill all the medications.

## 2021-07-01 NOTE — TELEPHONE ENCOUNTER
Last visit:   Last Med refill:   Does patient have enough medication for 72 hours: No:     Next Visit Date:  No future appointments. Health Maintenance   Topic Date Due    COVID-19 Vaccine (1) Never done    Hepatitis B vaccine (1 of 3 - Risk 3-dose series) Never done    Shingles Vaccine (1 of 2) Never done    Diabetic retinal exam  03/30/2016    Lipid screen  02/11/2017    Diabetic foot exam  09/18/2018    Diabetic microalbuminuria test  09/18/2018    Potassium monitoring  02/24/2019    Creatinine monitoring  02/24/2019    Annual Wellness Visit (AWV)  Never done    Flu vaccine (1) 09/01/2021    A1C test (Diabetic or Prediabetic)  05/03/2022    Colon cancer screen colonoscopy  08/25/2024    Pneumococcal 0-64 years Vaccine (2 of 2 - PPSV23) 04/27/2027    DTaP/Tdap/Td vaccine (2 - Td or Tdap) 06/16/2027    Hepatitis C screen  Addressed    HIV screen  Addressed    Hepatitis A vaccine  Aged Out    Hib vaccine  Aged Out    Meningococcal (ACWY) vaccine  Aged Out       Hemoglobin A1C (%)   Date Value   05/03/2021 8.8   01/11/2021 103   07/09/2019 8.8             ( goal A1C is < 7)   Microalb/Crt. Ratio (mcg/mg creat)   Date Value   02/11/2016 274     LDL Cholesterol (mg/dL)   Date Value   02/11/2016 89     LDL Calculated (mg/dL)   Date Value   07/24/2013 120       (goal LDL is <100)   AST (U/L)   Date Value   02/11/2016 19     ALT (U/L)   Date Value   02/11/2016 11     BUN (mg/dL)   Date Value   02/24/2018 12     BP Readings from Last 3 Encounters:   05/03/21 132/78   01/11/21 (!) 158/89   06/12/20 (!) 143/81          (goal 120/80)    All Future Testing planned in [facility]  Lab Frequency Next Occurrence   Microalbumin, Ur Once 01/11/2022   Lipid Panel Once 01/11/2022               Patient Active Problem List:     HLD (hyperlipidemia)     Essential hypertension     DM (diabetes mellitus) ([address]     Chronic back pain     Thoracic aortic aneurysm diagnosed  2010?  at [facility]     Acute pain of right shoulder     Other male erectile dysfunction           Please address the medication refill and close the encounter. If I can be of assistance, please route to the applicable pool. Thank you.

## 2021-07-19 DIAGNOSIS — I10 ESSENTIAL HYPERTENSION: ICD-10-CM

## 2021-07-19 RX ORDER — HYDROCHLOROTHIAZIDE 25 MG/1
TABLET ORAL
Qty: 30 TABLET | Refills: 0 | Status: SHIPPED | OUTPATIENT
Start: 2021-07-19 | End: 2021-07-26 | Stop reason: SDUPTHER

## 2021-07-19 NOTE — TELEPHONE ENCOUNTER
Please address the medication refill and close the encounter. If I can be of assistance, please route to the applicable pool. Thank you      Last visit: 05/03/2021  Last Med refill: 5/4/2021  Does patient have enough medication for 72 hours: No:     Next Visit Date:  No future appointments. Health Maintenance   Topic Date Due    COVID-19 Vaccine (1) Never done    Hepatitis B vaccine (1 of 3 - Risk 3-dose series) Never done    Shingles Vaccine (1 of 2) Never done    Diabetic retinal exam  03/30/2016    Lipid screen  02/11/2017    Diabetic foot exam  09/18/2018    Diabetic microalbuminuria test  09/18/2018    Potassium monitoring  02/24/2019    Creatinine monitoring  02/24/2019    Annual Wellness Visit (AWV)  Never done    Flu vaccine (1) 09/01/2021    A1C test (Diabetic or Prediabetic)  05/03/2022    Colon cancer screen colonoscopy  08/25/2024    Pneumococcal 0-64 years Vaccine (2 of 2 - PPSV23) 04/27/2027    DTaP/Tdap/Td vaccine (2 - Td or Tdap) 06/16/2027    Hepatitis C screen  Addressed    HIV screen  Addressed    Hepatitis A vaccine  Aged Out    Hib vaccine  Aged Out    Meningococcal (ACWY) vaccine  Aged Out       Hemoglobin A1C (%)   Date Value   05/03/2021 8.8   01/11/2021 103   07/09/2019 8.8             ( goal A1C is < 7)   Microalb/Crt.  Ratio (mcg/mg creat)   Date Value   02/11/2016 274     LDL Cholesterol (mg/dL)   Date Value   02/11/2016 89     LDL Calculated (mg/dL)   Date Value   07/24/2013 120       (goal LDL is <100)   AST (U/L)   Date Value   02/11/2016 19     ALT (U/L)   Date Value   02/11/2016 11     BUN (mg/dL)   Date Value   02/24/2018 12     BP Readings from Last 3 Encounters:   05/03/21 132/78   01/11/21 (!) 158/89   06/12/20 (!) 143/81          (goal 120/80)    All Future Testing planned in [facility]  Lab Frequency Next Occurrence   Microalbumin, Ur Once 01/11/2022   Lipid Panel Once 01/11/2022               Patient Active Problem List:     HLD (hyperlipidemia) Essential hypertension     DM (diabetes mellitus) (HCC)     Chronic back pain     Thoracic aortic aneurysm diagnosed  2010?  at [facility]     Acute pain of right shoulder     Other male erectile dysfunction

## 2021-07-20 ENCOUNTER — TELEPHONE (OUTPATIENT)
Dept: FAMILY MEDICINE CLINIC | Age: 61
End: 2021-07-20

## 2021-07-20 NOTE — TELEPHONE ENCOUNTER
----- Message from Domingo Patel sent at 7/19/2021  4:25 PM EDT -----  Subject: Message to Provider    QUESTIONS  Information for Provider? Pt is returning call from where an appointment   was tried to be scheduled for a refill.   ---------------------------------------------------------------------------  --------------  CALL BACK INFO  What is the best way for the office to contact you? OK to leave message on   voicemail  Preferred Call Back Phone Number? [phone]  ---------------------------------------------------------------------------  --------------  SCRIPT ANSWERS  Relationship to Patient?  Self

## 2021-07-26 ENCOUNTER — OFFICE VISIT (OUTPATIENT)
Dept: FAMILY MEDICINE CLINIC | Age: 61
End: 2021-07-26
Payer: COMMERCIAL

## 2021-07-26 VITALS
BODY MASS INDEX: 28.34 KG/M2 | DIASTOLIC BLOOD PRESSURE: 84 MMHG | WEIGHT: 187 LBS | SYSTOLIC BLOOD PRESSURE: 161 MMHG | HEART RATE: 66 BPM | HEIGHT: 68 IN

## 2021-07-26 DIAGNOSIS — E11.65 TYPE 2 DIABETES MELLITUS WITH HYPERGLYCEMIA, WITHOUT LONG-TERM CURRENT USE OF INSULIN (HCC): Primary | ICD-10-CM

## 2021-07-26 DIAGNOSIS — B35.1 ONYCHOMYCOSIS: ICD-10-CM

## 2021-07-26 DIAGNOSIS — I10 ESSENTIAL HYPERTENSION: ICD-10-CM

## 2021-07-26 PROCEDURE — 3017F COLORECTAL CA SCREEN DOC REV: CPT | Performed by: STUDENT IN AN ORGANIZED HEALTH CARE EDUCATION/TRAINING PROGRAM

## 2021-07-26 PROCEDURE — 99213 OFFICE O/P EST LOW 20 MIN: CPT | Performed by: STUDENT IN AN ORGANIZED HEALTH CARE EDUCATION/TRAINING PROGRAM

## 2021-07-26 PROCEDURE — 3052F HG A1C>EQUAL 8.0%<EQUAL 9.0%: CPT | Performed by: STUDENT IN AN ORGANIZED HEALTH CARE EDUCATION/TRAINING PROGRAM

## 2021-07-26 PROCEDURE — G8417 CALC BMI ABV UP PARAM F/U: HCPCS | Performed by: STUDENT IN AN ORGANIZED HEALTH CARE EDUCATION/TRAINING PROGRAM

## 2021-07-26 PROCEDURE — G8427 DOCREV CUR MEDS BY ELIG CLIN: HCPCS | Performed by: STUDENT IN AN ORGANIZED HEALTH CARE EDUCATION/TRAINING PROGRAM

## 2021-07-26 PROCEDURE — 2022F DILAT RTA XM EVC RTNOPTHY: CPT | Performed by: STUDENT IN AN ORGANIZED HEALTH CARE EDUCATION/TRAINING PROGRAM

## 2021-07-26 PROCEDURE — 1036F TOBACCO NON-USER: CPT | Performed by: STUDENT IN AN ORGANIZED HEALTH CARE EDUCATION/TRAINING PROGRAM

## 2021-07-26 RX ORDER — SIMVASTATIN 40 MG
40 TABLET ORAL DAILY
COMMUNITY

## 2021-07-26 RX ORDER — PRENATAL VIT 91/IRON/FOLIC/DHA 28-975-200
COMBINATION PACKAGE (EA) ORAL
Qty: 1 TUBE | Refills: 1 | Status: SHIPPED | OUTPATIENT
Start: 2021-07-26

## 2021-07-26 RX ORDER — HYDROCHLOROTHIAZIDE 25 MG/1
TABLET ORAL
Qty: 90 TABLET | Refills: 1 | Status: SHIPPED | OUTPATIENT
Start: 2021-07-26 | End: 2022-05-12

## 2021-07-26 SDOH — ECONOMIC STABILITY: FOOD INSECURITY: WITHIN THE PAST 12 MONTHS, THE FOOD YOU BOUGHT JUST DIDN'T LAST AND YOU DIDN'T HAVE MONEY TO GET MORE.: NEVER TRUE

## 2021-07-26 SDOH — ECONOMIC STABILITY: FOOD INSECURITY: WITHIN THE PAST 12 MONTHS, YOU WORRIED THAT YOUR FOOD WOULD RUN OUT BEFORE YOU GOT MONEY TO BUY MORE.: NEVER TRUE

## 2021-07-26 ASSESSMENT — ENCOUNTER SYMPTOMS
WHEEZING: 0
VOMITING: 0
COUGH: 0
SHORTNESS OF BREATH: 0
NAUSEA: 0
RHINORRHEA: 0
ABDOMINAL PAIN: 0
DIARRHEA: 0
SORE THROAT: 0
CONSTIPATION: 0

## 2021-07-26 ASSESSMENT — SOCIAL DETERMINANTS OF HEALTH (SDOH): HOW HARD IS IT FOR YOU TO PAY FOR THE VERY BASICS LIKE FOOD, HOUSING, MEDICAL CARE, AND HEATING?: NOT HARD AT ALL

## 2021-07-26 NOTE — PROGRESS NOTES
[address] [facility] - Outpatient Note      Latoya Parr is a 63 y.o. male Established patient, presented to the office today for:  Chief Complaint   Patient presents with    Medication Refill    Health Maintenance     declines vaccines, lab pended, pt get eye exam done at [facility] eye care, pt would like to hold off on foot exam         ASSESSMENT/PLAN:    1. Type 2 diabetes mellitus with hyperglycemia, without long-term current use of insulin (HCC)  - Continue metformin, glipizide, will check A1c at next visit. - Microalbumin, Ur; Future    2. Essential hypertension  - Consider titration off hydralazine and starting an ACEI at next visit. - Lipid Panel; Future  - simvastatin (ZOCOR) 40 MG tablet; Take 40 mg by mouth daily  - hydroCHLOROthiazide (HYDRODIURIL) 25 MG tablet; take 1 tablet by mouth once daily  Dispense: 90 tablet; Refill: 1  - AFL - Minh Fox DO, Cardiology, [place]  - Comprehensive Metabolic Panel; Future    3. Onychomycosis  - Will try topical, if no improvement will switch to oral terbinafine.  - terbinafine (ATHLETES FOOT, TERBINAFINE,) 1 % cream; Apply topically 2 times daily. Dispense: 1 Tube; Refill: 1          Requested Prescriptions     Signed Prescriptions Disp Refills    hydroCHLOROthiazide (HYDRODIURIL) 25 MG tablet 90 tablet 1     Sig: take 1 tablet by mouth once daily    terbinafine (ATHLETES FOOT, TERBINAFINE,) 1 % cream 1 Tube 1     Sig: Apply topically 2 times daily. Medications Discontinued During This Encounter   Medication Reason    sildenafil (VIAGRA) 50 MG tablet LIST CLEANUP    atorvastatin (LIPITOR) 80 MG tablet LIST CLEANUP    simvastatin (ZOCOR) 20 MG tablet LIST CLEANUP    hydroCHLOROthiazide (HYDRODIURIL) 25 MG tablet REORDER       Return in about 4 weeks (around 8/23/2021).       SUBJECTIVE/OBJECTIVE:      Latoya Parr is a 63 y.o. [place]  has a past medical history of Arthritis, Cerebral artery occlusion with cerebral infarction [facility]), Chronic back pain, Elbow pain, chronic, Full dentures, Fungus infection, Hyperlipidemia, Hypertension, Thoracic aortic aneurysm diagnosed  2010 at [facility], and Type II or unspecified type diabetes mellitus without mention of complication, not stated as uncontrolled. HPI    Established patient    Hx of T2DM on metformin and glipizide, last A1c was 8.8 in may, will repeat in 2 weeks. HTN on amlodipine, HCTZ, hydralazine. Has been out of HCTZ for a couple of months, BP was elevated at todays visit. Amlodipine side effect of bilateral LE swelling. Pt denies PND, orthopnea, dyspnea on exertion. Hx of CABG 5-6 years ago, does not follow with cardiology, will refer. Review of Systems   Constitutional: Negative for chills, diaphoresis and fever. HENT: Negative for congestion, rhinorrhea and sore throat. Eyes: Negative for visual disturbance. Respiratory: Negative for cough, shortness of breath and wheezing. Cardiovascular: Negative for chest pain, palpitations and leg swelling. Gastrointestinal: Negative for abdominal pain, constipation, diarrhea, nausea and vomiting. Genitourinary: Negative for difficulty urinating and dysuria. Musculoskeletal: Negative for arthralgias and myalgias. Skin: Negative for rash. Neurological: Negative for dizziness, light-headedness and headaches. The patient has a   Family History   Problem Relation Age of Onset    High Blood Pressure Mother     Diabetes Mother     Cancer Father     Heart Disease Father     Stroke Brother     Cirrhosis Brother        There were no vitals taken for this visit. BP Readings from Last 3 Encounters:   05/03/21 132/78   01/11/21 (!) 158/89   06/12/20 (!) 143/81       Physical Exam  Vitals and nursing note reviewed. Constitutional:       General: He is not in acute distress. Eyes:      Extraocular Movements: Extraocular movements intact.       Conjunctiva/sclera: Conjunctivae normal.   Cardiovascular:      Rate and Rhythm: Normal rate and regular rhythm. Pulses: Normal pulses. Dorsalis pedis pulses are 2+ on the right side and 2+ on the left side. Posterior tibial pulses are 2+ on the right side and 2+ on the left side. Heart sounds: Normal heart sounds. Pulmonary:      Effort: Pulmonary effort is normal.      Breath sounds: Normal breath sounds. Abdominal:      General: Bowel sounds are normal. There is no distension. Palpations: Abdomen is soft. Tenderness: There is no abdominal tenderness. There is no guarding. Musculoskeletal:      Right lower leg: No edema. Left lower leg: No edema. Right foot: Normal range of motion. No deformity or bunion. Left foot: Normal range of motion. No deformity or bunion. Feet:    Feet:      Right foot:      Protective Sensation: 10 sites tested. 10 sites sensed. Skin integrity: Skin breakdown present. No ulcer, blister, erythema, warmth, callus, dry skin or fissure. Toenail Condition: Right toenails are abnormally thick. Fungal disease present. Left foot:      Protective Sensation: 10 sites tested. 10 sites sensed. Skin integrity: Skin breakdown present. No ulcer, blister, erythema, warmth, callus, dry skin or fissure. Toenail Condition: Left toenails are abnormally thick. Fungal disease present. Neurological:      General: No focal deficit present. Mental Status: He is alert.          Lab Results   Component Value Date    WBC 7.4 02/24/2018    HGB 11.5 (L) 02/24/2018    HCT 37.7 (L) 02/24/2018     02/24/2018    CHOL 160 02/11/2016    TRIG 125 02/11/2016    HDL 46 02/11/2016    ALT 11 02/11/2016    AST 19 02/11/2016     02/24/2018    K 4.5 02/24/2018    CL 96 (L) 02/24/2018    CREATININE 0.85 02/24/2018    BUN 12 02/24/2018    CO2 27 02/24/2018    LABA1C 8.8 05/03/2021    LABMICR 274 02/11/2016     Lab Results   Component Value Date

## 2021-07-26 NOTE — PROGRESS NOTES
Attending Physician Statement  I have discussed the care of Mary Espinal 63 y.o. male, including pertinent history and exam findings, with the resident Dr. Miriam Panda MD.    History and Exam:   Chief Complaint   Patient presents with    Medication Refill    Health Maintenance     declines vaccines, lab pended, pt get eye exam done at [facility] eye care, pt would like to hold off on foot exam     Past Medical History:   Diagnosis Date    Arthritis     general    Cerebral artery occlusion with cerebral infarction (HCC)     mild    Chronic back pain     Elbow pain, chronic     Left    Full dentures     Fungus infection     bilat. feet    Hyperlipidemia     Hypertension     Thoracic aortic aneurysm diagnosed  2010 at [facility] Type II or unspecified type diabetes mellitus without mention of complication, not stated as uncontrolled      No Known Allergies   I have seen and examined the patient and the key elements of the encounter have been performed by me.   BP Readings from Last 3 Encounters:   07/26/21 (!) 161/84   05/03/21 132/78   01/11/21 (!) 158/89     BP (!) 161/84 (Site: Right Upper Arm, Position: Sitting, Cuff Size: Large Adult)   Pulse 66   Ht 5' 8\" (1.727 m)   Wt 187 lb (84.8 kg)   BMI 28.43 kg/m²   Lab Results   Component Value Date    WBC 7.4 02/24/2018    HGB 11.5 (L) 02/24/2018    HCT 37.7 (L) 02/24/2018     02/24/2018    CHOL 160 02/11/2016    TRIG 125 02/11/2016    HDL 46 02/11/2016    ALT 11 02/11/2016    AST 19 02/11/2016     02/24/2018    K 4.5 02/24/2018    CL 96 (L) 02/24/2018    CREATININE 0.85 02/24/2018    BUN 12 02/24/2018    CO2 27 02/24/2018    LABA1C 8.8 05/03/2021    LABMICR 274 02/11/2016     Lab Results   Component Value Date    LABALBU 4.4 02/11/2016     No results found for: IRON, TIBC, FERRITIN  Lab Results   Component Value Date    LDLCALC 120 07/24/2013    LDLCHOLESTEROL 89 02/11/2016     I agree with the assessment, plan and the diagnosis of    Diagnosis Orders   1. Type 2 diabetes mellitus with hyperglycemia, without long-term current use of insulin (HCC)  Microalbumin, Ur   2. Essential hypertension  Lipid Panel    simvastatin (ZOCOR) 40 MG tablet    hydroCHLOROthiazide (HYDRODIURIL) 25 MG tablet    KRISS - Minh Fox DO, Cardiology, [place]    Comprehensive Metabolic Panel   3. Onychomycosis  terbinafine (ATHLETES FOOT, TERBINAFINE,) 1 % cream    . I agree with orders as documented by the resident. More than 25 minutes spent  in face to face encounter with the patient and more than half in counseling. Patient's questions were answered. Patient Voiced understanding to the counseling. Return in about 4 weeks (around 8/23/2021) for Diabetes.    (GC Modifier)-Dr. Merle Ling MD

## 2021-08-06 DIAGNOSIS — Z86.73 HISTORY OF STROKE: ICD-10-CM

## 2021-08-06 DIAGNOSIS — I10 ESSENTIAL HYPERTENSION: ICD-10-CM

## 2021-08-06 RX ORDER — METOPROLOL TARTRATE 100 MG/1
TABLET ORAL
Qty: 60 TABLET | Refills: 1 | Status: SHIPPED | OUTPATIENT
Start: 2021-08-06 | End: 2021-10-08

## 2021-08-06 RX ORDER — AMLODIPINE BESYLATE 10 MG/1
TABLET ORAL
Qty: 30 TABLET | Refills: 0 | Status: SHIPPED | OUTPATIENT
Start: 2021-08-06 | End: 2021-09-10

## 2021-08-06 RX ORDER — CLOPIDOGREL BISULFATE 75 MG/1
TABLET ORAL
Qty: 30 TABLET | Refills: 0 | Status: SHIPPED | OUTPATIENT
Start: 2021-08-06 | End: 2021-08-26

## 2021-08-06 RX ORDER — GLIPIZIDE 10 MG/1
TABLET, FILM COATED, EXTENDED RELEASE ORAL
Qty: 60 TABLET | Refills: 1 | Status: SHIPPED | OUTPATIENT
Start: 2021-08-06 | End: 2021-10-08

## 2021-08-06 NOTE — TELEPHONE ENCOUNTER
Multiple medications pending for refill     Health Maintenance   Topic Date Due    COVID-19 Vaccine (1) Never done    Hepatitis B vaccine (1 of 3 - Risk 3-dose series) Never done    Shingles Vaccine (1 of 2) Never done    Diabetic retinal exam  03/30/2016    Lipid screen  02/11/2017    Diabetic foot exam  09/18/2018    Diabetic microalbuminuria test  09/18/2018    Potassium monitoring  02/24/2019    Creatinine monitoring  02/24/2019    Annual Wellness Visit (AWV)  Never done    Flu vaccine (1) 09/01/2021    A1C test (Diabetic or Prediabetic)  05/03/2022    Colon cancer screen colonoscopy  08/25/2024    Pneumococcal 0-64 years Vaccine (2 of 2 - PPSV23) 04/27/2027    DTaP/Tdap/Td vaccine (2 - Td or Tdap) 06/16/2027    Hepatitis C screen  Addressed    HIV screen  Addressed    Hepatitis A vaccine  Aged Out    Hib vaccine  Aged Out    Meningococcal (ACWY) vaccine  Aged Out             (applicable per patient's age: Cancer Screenings, Depression Screening, Fall Risk Screening, Immunizations)    Hemoglobin A1C (%)   Date Value   05/03/2021 8.8   01/11/2021 103   07/09/2019 8.8     Microalb/Crt.  Ratio (mcg/mg creat)   Date Value   02/11/2016 274     LDL Cholesterol (mg/dL)   Date Value   02/11/2016 89     LDL Calculated (mg/dL)   Date Value   07/24/2013 120     AST (U/L)   Date Value   02/11/2016 19     ALT (U/L)   Date Value   02/11/2016 11     BUN (mg/dL)   Date Value   02/24/2018 12      (goal A1C is < 7)   (goal LDL is <100) need 30-50% reduction from baseline     BP Readings from Last 3 Encounters:   07/26/21 (!) 161/84   05/03/21 132/78   01/11/21 (!) 158/89    (goal /80)      All Future Testing planned in [facility]:  Lab Frequency Next Occurrence   Lipid Panel Once 07/26/2022   Microalbumin, Ur Once 07/26/2022   Comprehensive Metabolic Panel Once 72/58/1333       Next Visit Date:  Future Appointments   Date Time Provider Cuca Leigh   8/26/2021  9:30 AM Marcio Soto [facility]            Patient Active Problem List:     HLD (hyperlipidemia)     Essential hypertension     DM (diabetes mellitus) ([facility] [address]     Chronic back pain     Thoracic aortic aneurysm diagnosed  2010?  at [facility]     Acute pain of right shoulder     Other male erectile dysfunction     Type 2 diabetes mellitus with hyperglycemia

## 2021-08-06 NOTE — TELEPHONE ENCOUNTER
Glipizide pending for refill     Health Maintenance   Topic Date Due    COVID-19 Vaccine (1) Never done    Hepatitis B vaccine (1 of 3 - Risk 3-dose series) Never done    Shingles Vaccine (1 of 2) Never done    Diabetic retinal exam  03/30/2016    Lipid screen  02/11/2017    Diabetic foot exam  09/18/2018    Diabetic microalbuminuria test  09/18/2018    Potassium monitoring  02/24/2019    Creatinine monitoring  02/24/2019    Annual Wellness Visit (AWV)  Never done    Flu vaccine (1) 09/01/2021    A1C test (Diabetic or Prediabetic)  05/03/2022    Colon cancer screen colonoscopy  08/25/2024    Pneumococcal 0-64 years Vaccine (2 of 2 - PPSV23) 04/27/2027    DTaP/Tdap/Td vaccine (2 - Td or Tdap) 06/16/2027    Hepatitis C screen  Addressed    HIV screen  Addressed    Hepatitis A vaccine  Aged Out    Hib vaccine  Aged Out    Meningococcal (ACWY) vaccine  Aged Out             (applicable per patient's age: Cancer Screenings, Depression Screening, Fall Risk Screening, Immunizations)    Hemoglobin A1C (%)   Date Value   05/03/2021 8.8   01/11/2021 103   07/09/2019 8.8     Microalb/Crt.  Ratio (mcg/mg creat)   Date Value   02/11/2016 274     LDL Cholesterol (mg/dL)   Date Value   02/11/2016 89     LDL Calculated (mg/dL)   Date Value   07/24/2013 120     AST (U/L)   Date Value   02/11/2016 19     ALT (U/L)   Date Value   02/11/2016 11     BUN (mg/dL)   Date Value   02/24/2018 12      (goal A1C is < 7)   (goal LDL is <100) need 30-50% reduction from baseline     BP Readings from Last 3 Encounters:   07/26/21 (!) 161/84   05/03/21 132/78   01/11/21 (!) 158/89    (goal /80)      All Future Testing planned in [facility]:  Lab Frequency Next Occurrence   Lipid Panel Once 07/26/2022   Microalbumin, Ur Once 07/26/2022   Comprehensive Metabolic Panel Once 26/43/8567       Next Visit Date:  Future Appointments   Date Time Provider Cuca Leigh   8/26/2021  9:30 AM Yevgeniy Wilkins MD [facility] Patient Active Problem List:     HLD (hyperlipidemia)     Essential hypertension     DM (diabetes mellitus) ([address]     Chronic back pain     Thoracic aortic aneurysm diagnosed  2010?  at [facility]     Acute pain of right shoulder     Other male erectile dysfunction     Type 2 diabetes mellitus with hyperglycemia

## 2021-08-17 DIAGNOSIS — Z86.73 HISTORY OF STROKE: ICD-10-CM

## 2021-08-17 RX ORDER — CLOPIDOGREL BISULFATE 75 MG/1
TABLET ORAL
Qty: 30 TABLET | Refills: 0 | OUTPATIENT
Start: 2021-08-17

## 2021-08-17 NOTE — TELEPHONE ENCOUNTER
Last visit: 07/26/21  Last Med refill: 08/06/21  Does patient have enough medication for 72 hours: Yes    Patient is also asking for a refill on his Atorvastatin 80 mg which has been discontinued. Patient is now on Bernerd Sequin. Please advise. Next Visit Date:  Future Appointments   Date Time Provider Cuca Lu   8/26/2021  9:30 AM Manuela Jensen MD [address] [place] [address] Maintenance   Topic Date Due    COVID-19 Vaccine (1) Never done    Hepatitis B vaccine (1 of 3 - Risk 3-dose series) Never done    Shingles Vaccine (1 of 2) Never done    Diabetic retinal exam  03/30/2016    Lipid screen  02/11/2017    Diabetic foot exam  09/18/2018    Diabetic microalbuminuria test  09/18/2018    Potassium monitoring  02/24/2019    Creatinine monitoring  02/24/2019    Annual Wellness Visit (AWV)  Never done    Flu vaccine (1) 09/01/2021    A1C test (Diabetic or Prediabetic)  05/03/2022    Colon cancer screen colonoscopy  08/25/2024    Pneumococcal 0-64 years Vaccine (2 of 2 - PPSV23) 04/27/2027    DTaP/Tdap/Td vaccine (2 - Td or Tdap) 06/16/2027    Hepatitis C screen  Addressed    HIV screen  Addressed    Hepatitis A vaccine  Aged Out    Hib vaccine  Aged Out    Meningococcal (ACWY) vaccine  Aged Out       Hemoglobin A1C (%)   Date Value   05/03/2021 8.8   01/11/2021 103   07/09/2019 8.8             ( goal A1C is < 7)   Microalb/Crt.  Ratio (mcg/mg creat)   Date Value   02/11/2016 274     LDL Cholesterol (mg/dL)   Date Value   02/11/2016 89     LDL Calculated (mg/dL)   Date Value   07/24/2013 120       (goal LDL is <100)   AST (U/L)   Date Value   02/11/2016 19     ALT (U/L)   Date Value   02/11/2016 11     BUN (mg/dL)   Date Value   02/24/2018 12     BP Readings from Last 3 Encounters:   07/26/21 (!) 161/84   05/03/21 132/78   01/11/21 (!) 158/89          (goal 120/80)    All Future Testing planned in [facility]  Lab Frequency Next Occurrence   Lipid Panel Once 07/26/2022   Microalbumin, Ur Once 07/26/2022   Comprehensive Metabolic Panel Once 82/03/0556               Patient Active Problem List:     HLD (hyperlipidemia)     Essential hypertension     DM (diabetes mellitus) ([facility] [address]     Chronic back pain     Thoracic aortic aneurysm diagnosed  2010?  at [facility]     Acute pain of right shoulder     Other male erectile dysfunction     Type 2 diabetes mellitus with hyperglycemia

## 2021-08-17 NOTE — TELEPHONE ENCOUNTER
----- Message from Ruchi [facility] MA sent at 8/16/2021  4:37 PM EDT -----  Subject: Refill Request    QUESTIONS  Name of Medication? Other - Atorvastatin   Patient-reported dosage and instructions? 80mg once daily  How many days do you have left? 9  Preferred Pharmacy? [facility] phone number (if available)? [phone]  ---------------------------------------------------------------------------  --------------,  Name of Medication? clopidogrel (PLAVIX) 75 MG tablet  Patient-reported dosage and instructions? 75mg once daily  How many days do you have left? 30  Preferred Pharmacy? [facility] phone number (if available)? [phone]  Additional Information for Provider? Ceasar Sharma with [facility]  ---------------------------------------------------------------------------  --------------  Efra LICONA  What is the best way for the office to contact you? Do not leave any   message, patient will call back for answer  Preferred Call Back Phone Number?  [phone]

## 2021-08-26 ENCOUNTER — HOSPITAL ENCOUNTER (OUTPATIENT)
Age: 61
Setting detail: SPECIMEN
Discharge: HOME | End: 2021-08-26
Payer: MEDICARE

## 2021-08-26 ENCOUNTER — OFFICE VISIT (OUTPATIENT)
Dept: FAMILY MEDICINE CLINIC | Age: 61
End: 2021-08-26
Payer: MEDICARE

## 2021-08-26 VITALS
BODY MASS INDEX: 27.83 KG/M2 | TEMPERATURE: 97.2 F | WEIGHT: 183.6 LBS | SYSTOLIC BLOOD PRESSURE: 142 MMHG | HEART RATE: 73 BPM | HEIGHT: 68 IN | DIASTOLIC BLOOD PRESSURE: 76 MMHG

## 2021-08-26 DIAGNOSIS — E11.65 TYPE 2 DIABETES MELLITUS WITH HYPERGLYCEMIA, WITHOUT LONG-TERM CURRENT USE OF INSULIN (HCC): ICD-10-CM

## 2021-08-26 DIAGNOSIS — I10 ESSENTIAL HYPERTENSION: ICD-10-CM

## 2021-08-26 DIAGNOSIS — E11.65 TYPE 2 DIABETES MELLITUS WITH HYPERGLYCEMIA, WITHOUT LONG-TERM CURRENT USE OF INSULIN (HCC): Primary | ICD-10-CM

## 2021-08-26 LAB
ALBUMIN SERPL-MCNC: 4.3 G/DL (ref 3.5–5.2)
ALBUMIN/GLOBULIN RATIO: 1.6 (ref 1–2.5)
ALP BLD-CCNC: 91 U/L (ref 40–129)
ALT SERPL-CCNC: 10 U/L (ref 5–41)
ANION GAP SERPL CALCULATED.3IONS-SCNC: 16 MMOL/L (ref 9–17)
AST SERPL-CCNC: 10 U/L
BILIRUB SERPL-MCNC: 0.55 MG/DL (ref 0.3–1.2)
BUN BLDV-MCNC: 18 MG/DL (ref 6–20)
BUN/CREAT BLD: ABNORMAL (ref 9–20)
CALCIUM SERPL-MCNC: 9.5 MG/DL (ref 8.6–10.4)
CHLORIDE BLD-SCNC: 101 MMOL/L (ref 98–107)
CHOLESTEROL/HDL RATIO: 3.4
CHOLESTEROL: 112 MG/DL
CO2: 24 MMOL/L (ref 20–31)
CREAT SERPL-MCNC: 1.07 MG/DL (ref 0.7–1.2)
CREATININE URINE: 66.2 MG/DL (ref 39–259)
GFR AFRICAN AMERICAN: >60 ML/MIN
GFR NON-AFRICAN AMERICAN: >60 ML/MIN
GFR SERPL CREATININE-BSD FRML MDRD: ABNORMAL ML/MIN/{1.73_M2}
GFR SERPL CREATININE-BSD FRML MDRD: ABNORMAL ML/MIN/{1.73_M2}
GLUCOSE BLD-MCNC: 233 MG/DL (ref 70–99)
HBA1C MFR BLD: 9 %
HDLC SERPL-MCNC: 33 MG/DL
LDL CHOLESTEROL: 52 MG/DL (ref 0–130)
MICROALBUMIN/CREAT 24H UR: 459 MG/L
MICROALBUMIN/CREAT UR-RTO: 693 MCG/MG CREAT
POTASSIUM SERPL-SCNC: 4.4 MMOL/L (ref 3.7–5.3)
SODIUM BLD-SCNC: 141 MMOL/L (ref 135–144)
TOTAL PROTEIN: 7 G/DL (ref 6.4–8.3)
TRIGL SERPL-MCNC: 137 MG/DL
VLDLC SERPL CALC-MCNC: ABNORMAL MG/DL (ref 1–30)

## 2021-08-26 PROCEDURE — 99213 OFFICE O/P EST LOW 20 MIN: CPT | Performed by: STUDENT IN AN ORGANIZED HEALTH CARE EDUCATION/TRAINING PROGRAM

## 2021-08-26 PROCEDURE — 83036 HEMOGLOBIN GLYCOSYLATED A1C: CPT | Performed by: STUDENT IN AN ORGANIZED HEALTH CARE EDUCATION/TRAINING PROGRAM

## 2021-08-26 PROCEDURE — 3052F HG A1C>EQUAL 8.0%<EQUAL 9.0%: CPT | Performed by: STUDENT IN AN ORGANIZED HEALTH CARE EDUCATION/TRAINING PROGRAM

## 2021-08-26 RX ORDER — ASPIRIN 81 MG/1
81 TABLET ORAL DAILY
Qty: 90 TABLET | Refills: 1 | Status: SHIPPED | OUTPATIENT
Start: 2021-08-26

## 2021-08-26 RX ORDER — CANAGLIFLOZIN 300 MG/1
300 TABLET, FILM COATED ORAL
Qty: 30 TABLET | Refills: 1 | Status: SHIPPED | OUTPATIENT
Start: 2021-08-26 | End: 2021-10-08

## 2021-08-26 ASSESSMENT — ENCOUNTER SYMPTOMS
CONSTIPATION: 0
NAUSEA: 0
ABDOMINAL PAIN: 0
SHORTNESS OF BREATH: 0
COUGH: 0
BACK PAIN: 0
DIARRHEA: 0
VOMITING: 0
RHINORRHEA: 0
WHEEZING: 0
SORE THROAT: 0

## 2021-08-26 NOTE — PATIENT INSTRUCTIONS
Patient Education        canagliflozin  Pronunciation:  JUSTICE a gli FLOE zin  Brand:  Invokana  What is the most important information I should know about canagliflozin? You should not use canagliflozin if you have severe kidney disease or if you are on dialysis. Canagliflozin can cause serious infections in the penis or vagina. Get medical help right away if you have burning, itching, odor, discharge, pain, tenderness, redness or swelling of the genital or rectal area, fever, or if you don't feel well. What is canagliflozin? Canagliflozin is used together with diet and exercise to improve blood sugar control in adults with type 2 diabetes mellitus. Canagliflozin is also used to lower the risk of death from heart attack, stroke, or heart failure in adults with type 2 diabetes who also have heart disease. Canagliflozin is also used to reduce the risk of end-stage kidney disease and hospitalization or death from heart problems in adults who also have kidney problems caused by type 2 diabetes. Canagliflozin is not for treating type 1 diabetes. Canagliflozin may also be used for purposes not listed in this medication guide. What should I discuss with my healthcare provider before taking canagliflozin? You should not use canagliflozin if you are allergic to it, or if you have:  · severe kidney disease (or if you are on dialysis). Canagliflozin may increase your risk of lower leg amputation,  especially if you have had a prior amputation, a foot ulcer, heart disease, circulation problems, or nerve damage.   Tell your doctor if you have ever had:  · heart problems;  · a diabetic foot ulcer or amputation;  · circulation problems or nerve problems in your legs or feet;  · kidney disease;  · liver disease;  · bladder infections or other urination problems;  · a pancreas disorder;  · if you drink often, or drink large amounts of alcohol;  · if you are eating less than usual; or  · if you are on a low salt diet.  Follow your doctor's instructions about using this medicine if you are pregnant. Blood sugar control is very important during pregnancy, and your dose needs may be different during each trimester. You should not use canagliflozin during the second or third trimester of pregnancy. You should not breastfeed while using this medicine. Canagliflozin is not approved for use by anyone younger than 25years old. How should I take canagliflozin? Follow all directions on your prescription label and read all medication guides or instruction sheets. Your doctor may occasionally change your dose. Use the medicine exactly as directed. Canagliflozin is usually taken once per day, before the first meal of the day. You may have very low blood pressure while taking this medicine. Call your doctor if you are sick with vomiting or diarrhea, or if you are sweating more than usual. Drink plenty of liquids while you are taking canagliflozin. You may have low blood sugar (hypoglycemia) and feel very hungry, dizzy, irritable, confused, anxious, or shaky. To quickly treat hypoglycemia, eat or drink a fast-acting source of sugar (fruit juice, hard candy, crackers, raisins, or non-diet soda). Your doctor may prescribe a glucagon injection kit in case you have severe hypoglycemia. Be sure your family or close friends know how to give you this injection in an emergency. Also watch for signs of high blood sugar (hyperglycemia) such as increased thirst or urination. Blood sugar levels can be affected by stress, illness, surgery, exercise, alcohol use, or skipping meals. Ask your doctor before changing your dose or medication schedule. Canagliflozin is only part of a complete treatment program that may also include diet, exercise, weight control, blood sugar testing, and special medical care. Follow your doctor's instructions very closely. This medicine can affect the results of certain medical tests.  Tell any doctor who treats you that you are using canagliflozin. Store at room temperature away from moisture and heat. What happens if I miss a dose? Take the medicine as soon as you can, but skip the missed dose if it is almost time for your next dose. Do not take two doses at one time. What happens if I overdose? Seek emergency medical attention or call the [employer] Help line at 1-491.488.3696. What should I avoid while taking canagliflozin? Avoid getting up too fast from a sitting or lying position, or you may feel dizzy. What are the possible side effects of canagliflozin? Get emergency medical help if you have signs of an allergic reaction: hives; difficult breathing; swelling of your face, lips, tongue, or throat. Seek medical attention right away if you have signs of a genital infection (penis or vagina): burning, itching, odor, discharge, pain, tenderness, redness or swelling of the genital or rectal area, fever, not feeling well. These symptoms may get worse quickly. Call your doctor at once if you have:  · a light-headed feeling, like you might pass out;  · little or no urination;  · pain or burning when you urinate;  · new pain, tenderness, sores, ulcers, or infections in your legs or feet;  · high potassium --nausea, irregular heartbeats, weakness, loss of movement;  · ketoacidosis (too much acid in the blood) --nausea, vomiting, stomach pain, confusion, unusual drowsiness, or trouble breathing; or  · dehydration symptoms --dizziness, weakness, feeling light-headed (like you might pass out). You may be more likely to have a broken bone while using canagliflozin. Talk with your doctor about how to avoid the risk of fractures. Side effects may be more likely to occur in older adults. Common side effects may include:  · genital infections; or  · urinating more than usual.  This is not a complete list of side effects and others may occur. Call your doctor for medical advice about side effects.  You may report side effects to FDA at 1-613-XSZ-8993. What other drugs will affect canagliflozin? Tell your doctor about all your other medicines, especially:  · insulin or other oral diabetes medicines;  · a diuretic or \"water pill\";  · digoxin, digitalis;  · rifampin;  · ritonavir; or  · seizure medication --phenobarbital, phenytoin. This list is not complete. Other drugs may affect canagliflozin, including prescription and over-the-counter medicines, vitamins, and herbal products. Not all possible drug interactions are listed here. Where can I get more information? Your pharmacist can provide more information about canagliflozin. Remember, keep this and all other medicines out of the reach of children, never share your medicines with others, and use this medication only for the indication prescribed. Every effort has been made to ensure that the information provided by [address] is accurate, up-to-date, and complete, but no guarantee is made to that effect. Drug information contained herein may be time sensitive. [employer] information has been compiled for use by healthcare practitioners and consumers in the [facility] and therefore [employer] does not warrant that uses outside of the [facility] are appropriate, unless specifically indicated otherwise. [facility] [employer] drug information does not endorse drugs, diagnose patients or recommend therapy. [employer] [facility] drug information is an informational resource designed to assist licensed healthcare practitioners in caring for their patients and/or to serve consumers viewing this service as a supplement to, and not a substitute for, the expertise, skill, knowledge and judgment of healthcare practitioners. The absence of a warning for a given drug or drug combination in no way should be construed to indicate that the drug or drug combination is safe, effective or appropriate for any given patient.  [employer] does not assume any responsibility for any aspect of healthcare administered with the aid of information Jose provides. The information contained herein is not intended to cover all possible uses, directions, precautions, warnings, drug interactions, allergic reactions, or adverse effects. If you have questions about the drugs you are taking, check with your doctor, nurse or pharmacist.  Copyright 1127-6558 [address]. Version: 6.01. Revision date: 9/8/2020. Care instructions adapted under license by [facility] ([facility]). If you have questions about a medical condition or this instruction, always ask your healthcare professional. [place] [address] any warranty or liability for your use of this information.

## 2021-08-26 NOTE — PROGRESS NOTES
[address] [facility] Medicine Residency Program - Outpatient Note      Marquita Plasencia is a 63 y.o. male Established patient, presented to the office today for:  Chief Complaint   Patient presents with    Diabetes     Follow up         ASSESSMENT/PLAN:    1. Type 2 diabetes mellitus with hyperglycemia, without long-term current use of insulin ([address]  - poorly controlled, continue metformin and glipizide, start invokana  - POCT glycosylated hemoglobin (Hb A1C)  - [address]  - canagliflozin (INVOKANA) 300 MG TABS tablet; Take 1 tablet by mouth every morning (before breakfast) Take half pill for first week, take full pill after 1 week  Dispense: 30 tablet; Refill: 1    2. Essential hypertension  - Continue amlodipine 10mg  - Check CMP          Requested Prescriptions     Signed Prescriptions Disp Refills    aspirin EC 81 MG EC tablet 90 tablet 1     Sig: Take 1 tablet by mouth daily    canagliflozin (INVOKANA) 300 MG TABS tablet 30 tablet 1     Sig: Take 1 tablet by mouth every morning (before breakfast) Take half pill for first week, take full pill after 1 week       Medications Discontinued During This Encounter   Medication Reason    Insulin Pen Needle (B-D ULTRAFINE III SHORT PEN) 31G X 8 MM MISC LIST CLEANUP    clopidogrel (PLAVIX) 75 MG tablet LIST CLEANUP    pantoprazole (PROTONIX) 20 MG tablet LIST CLEANUP    aspirin EC 81 MG EC tablet REORDER       Return in about 4 weeks (around 9/23/2021). SUBJECTIVE/OBJECTIVE:      Marquita Plasencia is a 63 y.o. Kristoscar Bon  has a past medical history of Arthritis, Cerebral artery occlusion with cerebral infarction [facility]), Chronic back pain, Elbow pain, chronic, Full dentures, Fungus infection, Hyperlipidemia, Hypertension, Thoracic aortic aneurysm diagnosed  2010 at [facility], and Type II or unspecified type diabetes mellitus without mention of complication, not stated as uncontrolled.     HPI    T2DM poorly controlled, on metformin and glipizide, discussed starting invokana, pt agreeable. HTN doing well on amlodipine      Review of Systems   Constitutional: Negative for chills, diaphoresis and fever. HENT: Negative for congestion, rhinorrhea and sore throat. Respiratory: Negative for cough, shortness of breath and wheezing. Cardiovascular: Negative for chest pain, palpitations and leg swelling. Gastrointestinal: Negative for abdominal pain, constipation, diarrhea, nausea and vomiting. Genitourinary: Negative for difficulty urinating and dysuria. Musculoskeletal: Negative for arthralgias, back pain and myalgias. Neurological: Negative for dizziness, light-headedness and headaches. The patient has a   Family History   Problem Relation Age of Onset    High Blood Pressure Mother     Diabetes Mother     Cancer Father     Heart Disease Father     Stroke Brother     Cirrhosis Brother        BP (!) 142/76 (Site: Right Upper Arm, Position: Sitting, Cuff Size: Medium Adult)   Pulse 73   Temp 97.2 °F (36.2 °C) (Temporal)   Ht 5' 7.99\" (1.727 m)   Wt 183 lb 9.6 oz (83.3 kg)   BMI 27.92 kg/m²    BP Readings from Last 3 Encounters:   08/26/21 (!) 142/76   07/26/21 (!) 161/84   05/03/21 132/78       Physical Exam  Vitals reviewed. Constitutional:       General: He is not in acute distress. Eyes:      Extraocular Movements: Extraocular movements intact. Conjunctiva/sclera: Conjunctivae normal.   Cardiovascular:      Rate and Rhythm: Normal rate and regular rhythm. Pulses: Normal pulses. Heart sounds: Normal heart sounds. Pulmonary:      Effort: Pulmonary effort is normal.      Breath sounds: Normal breath sounds. Abdominal:      General: Bowel sounds are normal. There is no distension. Palpations: Abdomen is soft. Tenderness: There is no abdominal tenderness. There is no guarding. Neurological:      General: No focal deficit present. Mental Status: He is alert.          Lab Results   Component Value Date    WBC 7.4 02/24/2018    HGB 11.5 (L) 02/24/2018    HCT 37.7 (L) 02/24/2018     02/24/2018    CHOL 160 02/11/2016    TRIG 125 02/11/2016    HDL 46 02/11/2016    ALT 11 02/11/2016    AST 19 02/11/2016     02/24/2018    K 4.5 02/24/2018    CL 96 (L) 02/24/2018    CREATININE 0.85 02/24/2018    BUN 12 02/24/2018    CO2 27 02/24/2018    LABA1C 8.8 05/03/2021    LABMICR 274 02/11/2016     Lab Results   Component Value Date    CALCIUM 9.6 02/24/2018     Lab Results   Component Value Date    LDLCALC 120 07/24/2013    LDLCHOLESTEROL 89 02/11/2016            All patient questions answered. Pt voiced understanding. Brie Sumner MD  Family Medicine PGY-3  08/26/21 at 10:05 AM      Disclaimer: Some orall of this note was transcribed using voice-recognition software. This may cause typographical errors occasionally. Although all effort is made to fix these errors, please do not hesitate to contact our office if there Tavares Amis concern with the understanding of this note. An electronic signature was used to authenticate this note.

## 2021-08-26 NOTE — PROGRESS NOTES
Visit Information    Have you changed or started any medications since your last visit including any over-the-counter medicines, vitamins, or herbal medicines? no   Have you stopped taking any of your medications? Is so, why? -  no  Are you having any side effects from any of your medications? - no    Have you seen any other physician or provider since your last visit?  no   Have you had any other diagnostic tests since your last visit?  no   Have you been seen in the emergency room and/or had an admission in a hospital since we last saw you?  no   Have you had your routine dental cleaning in the past 6 months?  no     Do you have an active MyChart account? If no, what is the barrier?   No: Inactive    Patient Care Team:  Melissa Higgins MD as PCP - General (Family Medicine)    Medical History Review  Past Medical, Family, and Social History reviewed and does not contribute to the patient presenting condition    Health Maintenance   Topic Date Due    COVID-19 Vaccine (1) Never done    Hepatitis B vaccine (1 of 3 - Risk 3-dose series) Never done    Shingles Vaccine (1 of 2) Never done    Diabetic retinal exam  03/30/2016    Lipid screen  02/11/2017    Diabetic foot exam  09/18/2018    Diabetic microalbuminuria test  09/18/2018    Potassium monitoring  02/24/2019    Creatinine monitoring  02/24/2019    Annual Wellness Visit (AWV)  Never done    Flu vaccine (1) 09/01/2021    A1C test (Diabetic or Prediabetic)  05/03/2022    Colon cancer screen colonoscopy  08/25/2024    Pneumococcal 0-64 years Vaccine (2 of 2 - PPSV23) 04/27/2027    DTaP/Tdap/Td vaccine (2 - Td or Tdap) 06/16/2027    Hepatitis C screen  Addressed    HIV screen  Addressed    Hepatitis A vaccine  Aged Out    Hib vaccine  Aged Out    Meningococcal (ACWY) vaccine  Aged Out

## 2021-09-02 NOTE — PROGRESS NOTES
Attending Physician Statement    Wt Readings from Last 3 Encounters:   08/26/21 183 lb 9.6 oz (83.3 kg)   07/26/21 187 lb (84.8 kg)   05/03/21 189 lb 9.6 oz (86 kg)     Temp Readings from Last 3 Encounters:   08/26/21 97.2 °F (36.2 °C) (Temporal)   01/11/21 97.6 °F (36.4 °C) (Temporal)   06/12/20 98 °F (36.7 °C) (Oral)     BP Readings from Last 3 Encounters:   08/26/21 (!) 142/76   07/26/21 (!) 161/84   05/03/21 132/78     Pulse Readings from Last 3 Encounters:   08/26/21 73   07/26/21 66   05/03/21 69         I have discussed the care of Hayden Brightly, including pertinent history and exam findings with the resident. I have reviewed the key elements of all parts of the encounter with the resident. I agree with the assessment, plan and orders as documented by the resident.   (GE Modifier)

## 2021-09-10 NOTE — TELEPHONE ENCOUNTER
E-scribe request for metformin. Please review and e-scribe if applicable. Last Visit Date:  08/26/2021  Next Visit Date:  Visit date not found    Hemoglobin A1C (%)   Date Value   08/26/2021 9.0   05/03/2021 8.8   01/11/2021 103             ( goal A1C is < 7)   Microalb/Crt. Ratio (mcg/mg creat)   Date Value   08/26/2021 693 (H)     LDL Cholesterol (mg/dL)   Date Value   08/26/2021 52     LDL Calculated (mg/dL)   Date Value   07/24/2013 120       (goal LDL is <100)   AST (U/L)   Date Value   08/26/2021 10     ALT (U/L)   Date Value   08/26/2021 10     BUN (mg/dL)   Date Value   08/26/2021 18     BP Readings from Last 3 Encounters:   08/26/21 (!) 142/76   07/26/21 (!) 161/84   05/03/21 132/78          (goal 120/80)        Patient Active Problem List:     HLD (hyperlipidemia)     Essential hypertension     DM (diabetes mellitus) ([facility] [address]     Chronic back pain     Thoracic aortic aneurysm diagnosed  2010?  at [facility]     Acute pain of right shoulder     Other male erectile dysfunction     Type 2 diabetes mellitus with hyperglycemia      ----Jayden Koroma

## 2021-10-08 DIAGNOSIS — I10 ESSENTIAL HYPERTENSION: ICD-10-CM

## 2021-10-08 DIAGNOSIS — M25.511 ACUTE PAIN OF RIGHT SHOULDER: ICD-10-CM

## 2021-10-08 DIAGNOSIS — E11.65 TYPE 2 DIABETES MELLITUS WITH HYPERGLYCEMIA, WITHOUT LONG-TERM CURRENT USE OF INSULIN (HCC): ICD-10-CM

## 2021-10-08 RX ORDER — IBUPROFEN 800 MG/1
TABLET ORAL
Qty: 120 TABLET | Refills: 2 | Status: SHIPPED | OUTPATIENT
Start: 2021-10-08 | End: 2022-01-03

## 2021-10-08 RX ORDER — AMLODIPINE BESYLATE 10 MG/1
TABLET ORAL
Qty: 30 TABLET | Refills: 0 | Status: SHIPPED | OUTPATIENT
Start: 2021-10-08 | End: 2021-11-05

## 2021-10-08 RX ORDER — CANAGLIFLOZIN 300 MG/1
TABLET, FILM COATED ORAL
Qty: 30 TABLET | Refills: 1 | Status: SHIPPED | OUTPATIENT
Start: 2021-10-08

## 2021-10-08 NOTE — TELEPHONE ENCOUNTER
mellitus) ([facility] [address]     Chronic back pain     Thoracic aortic aneurysm diagnosed  2010?  at [facility]     Acute pain of right shoulder     Other male erectile dysfunction     Type 2 diabetes mellitus with hyperglycemia

## 2021-10-08 NOTE — TELEPHONE ENCOUNTER
Metformin pending for refill     Health Maintenance   Topic Date Due    COVID-19 Vaccine (1) Never done    Hepatitis B vaccine (1 of 3 - Risk 3-dose series) Never done    Shingles Vaccine (1 of 2) Never done    Diabetic retinal exam  03/30/2016    Diabetic foot exam  09/18/2018    Annual Wellness Visit (AWV)  Never done    Flu vaccine (1) Never done    A1C test (Diabetic or Prediabetic)  11/26/2021    Diabetic microalbuminuria test  08/26/2022    Lipid screen  08/26/2022    Potassium monitoring  08/26/2022    Creatinine monitoring  08/26/2022    Colon cancer screen colonoscopy  08/25/2024    Pneumococcal 0-64 years Vaccine (2 of 2 - PPSV23) 04/27/2027    DTaP/Tdap/Td vaccine (2 - Td or Tdap) 06/16/2027    Hepatitis C screen  Addressed    HIV screen  Addressed    Hepatitis A vaccine  Aged Out    Hib vaccine  Aged Out    Meningococcal (ACWY) vaccine  Aged Out             (applicable per patient's age: Cancer Screenings, Depression Screening, Fall Risk Screening, Immunizations)    Hemoglobin A1C (%)   Date Value   08/26/2021 9.0   05/03/2021 8.8   01/11/2021 103     Microalb/Crt.  Ratio (mcg/mg creat)   Date Value   08/26/2021 693 (H)     LDL Cholesterol (mg/dL)   Date Value   08/26/2021 52     LDL Calculated (mg/dL)   Date Value   07/24/2013 120     AST (U/L)   Date Value   08/26/2021 10     ALT (U/L)   Date Value   08/26/2021 10     BUN (mg/dL)   Date Value   08/26/2021 18      (goal A1C is < 7)   (goal LDL is <100) need 30-50% reduction from baseline     BP Readings from Last 3 Encounters:   08/26/21 (!) 142/76   07/26/21 (!) 161/84   05/03/21 132/78    (goal /80)      All Future Testing planned in [facility]:  Lab Frequency Next Occurrence       Next Visit Date:  Future Appointments   Date Time Provider Cuca Leigh   10/15/2021  9:45 AM Mabel Soriano MD [address] [facility]            Patient Active Problem List:     HLD (hyperlipidemia)     Essential hypertension     DM (diabetes mellitus) ([facility] [address]     Chronic back pain     Thoracic aortic aneurysm diagnosed  2010?  at [facility]     Acute pain of right shoulder     Other male erectile dysfunction     Type 2 diabetes mellitus with hyperglycemia

## 2021-10-08 NOTE — TELEPHONE ENCOUNTER
invokana pending for refill     Health Maintenance   Topic Date Due    COVID-19 Vaccine (1) Never done    Hepatitis B vaccine (1 of 3 - Risk 3-dose series) Never done    Shingles Vaccine (1 of 2) Never done    Diabetic retinal exam  03/30/2016    Diabetic foot exam  09/18/2018    Annual Wellness Visit (AWV)  Never done    Flu vaccine (1) Never done    A1C test (Diabetic or Prediabetic)  11/26/2021    Diabetic microalbuminuria test  08/26/2022    Lipid screen  08/26/2022    Potassium monitoring  08/26/2022    Creatinine monitoring  08/26/2022    Colon cancer screen colonoscopy  08/25/2024    Pneumococcal 0-64 years Vaccine (2 of 2 - PPSV23) 04/27/2027    DTaP/Tdap/Td vaccine (2 - Td or Tdap) 06/16/2027    Hepatitis C screen  Addressed    HIV screen  Addressed    Hepatitis A vaccine  Aged Out    Hib vaccine  Aged Out    Meningococcal (ACWY) vaccine  Aged Out             (applicable per patient's age: Cancer Screenings, Depression Screening, Fall Risk Screening, Immunizations)    Hemoglobin A1C (%)   Date Value   08/26/2021 9.0   05/03/2021 8.8   01/11/2021 103     Microalb/Crt.  Ratio (mcg/mg creat)   Date Value   08/26/2021 693 (H)     LDL Cholesterol (mg/dL)   Date Value   08/26/2021 52     LDL Calculated (mg/dL)   Date Value   07/24/2013 120     AST (U/L)   Date Value   08/26/2021 10     ALT (U/L)   Date Value   08/26/2021 10     BUN (mg/dL)   Date Value   08/26/2021 18      (goal A1C is < 7)   (goal LDL is <100) need 30-50% reduction from baseline     BP Readings from Last 3 Encounters:   08/26/21 (!) 142/76   07/26/21 (!) 161/84   05/03/21 132/78    (goal /80)      All Future Testing planned in [facility]:  Lab Frequency Next Occurrence       Next Visit Date:  Future Appointments   Date Time Provider Cuca Leigh   10/15/2021  9:45 AM Gladys Hendrickson MD [address] [facility]            Patient Active Problem List:     HLD (hyperlipidemia)     Essential hypertension     DM (diabetes mellitus) ([facility] [address]     Chronic back pain     Thoracic aortic aneurysm diagnosed  2010?  at [facility]     Acute pain of right shoulder     Other male erectile dysfunction     Type 2 diabetes mellitus with hyperglycemia

## 2021-10-21 DIAGNOSIS — I10 ESSENTIAL HYPERTENSION: ICD-10-CM

## 2021-10-21 RX ORDER — HYDRALAZINE HYDROCHLORIDE 10 MG/1
TABLET, FILM COATED ORAL
Qty: 180 TABLET | Refills: 1 | Status: SHIPPED | OUTPATIENT
Start: 2021-10-21 | End: 2022-01-24

## 2021-10-21 RX ORDER — LISINOPRIL 40 MG/1
TABLET ORAL
Qty: 120 TABLET | Refills: 0 | Status: SHIPPED | OUTPATIENT
Start: 2021-10-21 | End: 2022-01-24

## 2021-11-05 DIAGNOSIS — Z86.73 HISTORY OF STROKE: ICD-10-CM

## 2021-11-05 DIAGNOSIS — I10 ESSENTIAL HYPERTENSION: ICD-10-CM

## 2021-11-05 RX ORDER — CLOPIDOGREL BISULFATE 75 MG/1
TABLET ORAL
Qty: 30 TABLET | Refills: 0 | Status: SHIPPED | OUTPATIENT
Start: 2021-11-05 | End: 2021-12-06

## 2021-11-05 RX ORDER — AMLODIPINE BESYLATE 10 MG/1
TABLET ORAL
Qty: 30 TABLET | Refills: 2 | Status: SHIPPED | OUTPATIENT
Start: 2021-11-05 | End: 2022-01-24

## 2021-11-05 NOTE — TELEPHONE ENCOUNTER
Please address the medication refill and close the encounter. If I can be of assistance, please route to the applicable pool. Thank you. Last visit: 8/26/2021  Last Med refill: 10/8/2021 amlodipine    10/8/2021 metformin  Does patient have enough medication for 72 hours: No:     Next Visit Date:  No future appointments. Health Maintenance   Topic Date Due    COVID-19 Vaccine (1) Never done    Hepatitis B vaccine (1 of 3 - Risk 3-dose series) Never done    Shingles Vaccine (1 of 2) Never done    Diabetic retinal exam  03/30/2016    Diabetic foot exam  09/18/2018    Annual Wellness Visit (AWV)  Never done    Flu vaccine (1) Never done    A1C test (Diabetic or Prediabetic)  11/26/2021    Diabetic microalbuminuria test  08/26/2022    Lipid screen  08/26/2022    Potassium monitoring  08/26/2022    Creatinine monitoring  08/26/2022    Colon cancer screen colonoscopy  08/25/2024    Pneumococcal 0-64 years Vaccine (2 of 2 - PPSV23) 04/27/2027    DTaP/Tdap/Td vaccine (2 - Td or Tdap) 06/16/2027    Hepatitis C screen  Addressed    HIV screen  Addressed    Hepatitis A vaccine  Aged Out    Hib vaccine  Aged Out    Meningococcal (ACWY) vaccine  Aged Out       Hemoglobin A1C (%)   Date Value   08/26/2021 9.0   05/03/2021 8.8   01/11/2021 103             ( goal A1C is < 7)   Microalb/Crt.  Ratio (mcg/mg creat)   Date Value   08/26/2021 693 (H)     LDL Cholesterol (mg/dL)   Date Value   08/26/2021 52   02/11/2016 89     LDL Calculated (mg/dL)   Date Value   07/24/2013 120       (goal LDL is <100)   AST (U/L)   Date Value   08/26/2021 10     ALT (U/L)   Date Value   08/26/2021 10     BUN (mg/dL)   Date Value   08/26/2021 18     BP Readings from Last 3 Encounters:   08/26/21 (!) 142/76   07/26/21 (!) 161/84   05/03/21 132/78          (goal 120/80)    All Future Testing planned in [facility]  Lab Frequency Next Occurrence               Patient Active Problem List:     HLD (hyperlipidemia)     Essential hypertension     DM (diabetes mellitus) (HCC)     Chronic back pain     Thoracic aortic aneurysm diagnosed  2010?  at [facility]     Acute pain of right shoulder     Other male erectile dysfunction     Type 2 diabetes mellitus with hyperglycemia

## 2021-12-03 DIAGNOSIS — Z86.73 HISTORY OF STROKE: ICD-10-CM

## 2021-12-03 RX ORDER — GLIPIZIDE 10 MG/1
TABLET, FILM COATED, EXTENDED RELEASE ORAL
Qty: 60 TABLET | Refills: 1 | Status: SHIPPED | OUTPATIENT
Start: 2021-12-03 | End: 2022-04-08

## 2021-12-03 RX ORDER — METOPROLOL TARTRATE 100 MG/1
TABLET ORAL
Qty: 60 TABLET | Refills: 1 | Status: SHIPPED | OUTPATIENT
Start: 2021-12-03 | End: 2022-01-24 | Stop reason: SDUPTHER

## 2021-12-03 RX ORDER — GLIPIZIDE 10 MG/1
TABLET, FILM COATED, EXTENDED RELEASE ORAL
Qty: 60 TABLET | Refills: 1 | Status: SHIPPED | OUTPATIENT
Start: 2021-12-03 | End: 2021-12-03

## 2021-12-03 NOTE — TELEPHONE ENCOUNTER
Please address the medication refill and close the encounter. If I can be of assistance, please route to the applicable pool. Thank you. Last visit: 8-26-21  Last Med refill: 12-3-21  Does patient have enough medication for 72 hours: No:     Next Visit Date:  Future Appointments   Date Time Provider Cuca Leigh   12/9/2021  2:00 PM Samantha Roberts MD [address] [place] [address] Maintenance   Topic Date Due    COVID-19 Vaccine (1) Never done    Hepatitis B vaccine (1 of 3 - Risk 3-dose series) Never done    Shingles Vaccine (1 of 2) Never done    Diabetic retinal exam  03/30/2016    Diabetic foot exam  09/18/2018    Annual Wellness Visit (AWV)  Never done    Flu vaccine (1) Never done    A1C test (Diabetic or Prediabetic)  11/26/2021    Diabetic microalbuminuria test  08/26/2022    Lipid screen  08/26/2022    Potassium monitoring  08/26/2022    Creatinine monitoring  08/26/2022    Colon cancer screen colonoscopy  08/25/2024    Pneumococcal 0-64 years Vaccine (2 of 2 - PPSV23) 04/27/2027    DTaP/Tdap/Td vaccine (2 - Td or Tdap) 06/16/2027    Hepatitis C screen  Addressed    HIV screen  Addressed    Hepatitis A vaccine  Aged Out    Hib vaccine  Aged Out    Meningococcal (ACWY) vaccine  Aged Out       Hemoglobin A1C (%)   Date Value   08/26/2021 9.0   05/03/2021 8.8   01/11/2021 103             ( goal A1C is < 7)   Microalb/Crt.  Ratio (mcg/mg creat)   Date Value   08/26/2021 693 (H)     LDL Cholesterol (mg/dL)   Date Value   08/26/2021 52   02/11/2016 89     LDL Calculated (mg/dL)   Date Value   07/24/2013 120       (goal LDL is <100)   AST (U/L)   Date Value   08/26/2021 10     ALT (U/L)   Date Value   08/26/2021 10     BUN (mg/dL)   Date Value   08/26/2021 18     BP Readings from Last 3 Encounters:   08/26/21 (!) 142/76   07/26/21 (!) 161/84   05/03/21 132/78          (goal 120/80)    All Future Testing planned in [facility]  Lab Frequency Next Occurrence               Patient Active Problem List:     HLD (hyperlipidemia)     Essential hypertension     DM (diabetes mellitus) ([facility] [address]     Chronic back pain     Thoracic aortic aneurysm diagnosed  2010?  at [facility]     Acute pain of right shoulder     Other male erectile dysfunction     Type 2 diabetes mellitus with hyperglycemia

## 2021-12-03 NOTE — TELEPHONE ENCOUNTER
Please address the medication refill and close the encounter. If I can be of assistance, please route to the applicable pool. Thank you. Last visit: 8/26/21  Last Med refill: 10/08/2021  Does patient have enough medication for 72 hours: No:     Next Visit Date:  Future Appointments   Date Time Provider Cuca Leigh   12/9/2021  2:00 PM Jill Bustamante MD [address] [place] [address] Maintenance   Topic Date Due    COVID-19 Vaccine (1) Never done    Hepatitis B vaccine (1 of 3 - Risk 3-dose series) Never done    Shingles Vaccine (1 of 2) Never done    Diabetic retinal exam  03/30/2016    Diabetic foot exam  09/18/2018    Annual Wellness Visit (AWV)  Never done    Flu vaccine (1) Never done    A1C test (Diabetic or Prediabetic)  11/26/2021    Diabetic microalbuminuria test  08/26/2022    Lipid screen  08/26/2022    Potassium monitoring  08/26/2022    Creatinine monitoring  08/26/2022    Colon cancer screen colonoscopy  08/25/2024    Pneumococcal 0-64 years Vaccine (2 of 2 - PPSV23) 04/27/2027    DTaP/Tdap/Td vaccine (2 - Td or Tdap) 06/16/2027    Hepatitis C screen  Addressed    HIV screen  Addressed    Hepatitis A vaccine  Aged Out    Hib vaccine  Aged Out    Meningococcal (ACWY) vaccine  Aged Out       Hemoglobin A1C (%)   Date Value   08/26/2021 9.0   05/03/2021 8.8   01/11/2021 103             ( goal A1C is < 7)   Microalb/Crt.  Ratio (mcg/mg creat)   Date Value   08/26/2021 693 (H)     LDL Cholesterol (mg/dL)   Date Value   08/26/2021 52   02/11/2016 89     LDL Calculated (mg/dL)   Date Value   07/24/2013 120       (goal LDL is <100)   AST (U/L)   Date Value   08/26/2021 10     ALT (U/L)   Date Value   08/26/2021 10     BUN (mg/dL)   Date Value   08/26/2021 18     BP Readings from Last 3 Encounters:   08/26/21 (!) 142/76   07/26/21 (!) 161/84   05/03/21 132/78          (goal 120/80)    All Future Testing planned in [facility]  Lab Frequency Next Occurrence               Patient Active Problem List:     HLD (hyperlipidemia)     Essential hypertension     DM (diabetes mellitus) ([facility] [address]     Chronic back pain     Thoracic aortic aneurysm diagnosed  2010?  at [facility]     Acute pain of right shoulder     Other male erectile dysfunction     Type 2 diabetes mellitus with hyperglycemia

## 2021-12-03 NOTE — TELEPHONE ENCOUNTER
Patient is [facility] 12-9-21       Please address the medication refill and close the encounter. If I can be of assistance, please route to the applicable pool. Thank you. Last visit: 8-26-21  Last Med refill: 10/8/2021  Does patient have enough medication for 72 hours: No:     Next Visit Date:  No future appointments. Health Maintenance   Topic Date Due    COVID-19 Vaccine (1) Never done    Hepatitis B vaccine (1 of 3 - Risk 3-dose series) Never done    Shingles Vaccine (1 of 2) Never done    Diabetic retinal exam  03/30/2016    Diabetic foot exam  09/18/2018    Annual Wellness Visit (AWV)  Never done    Flu vaccine (1) Never done    A1C test (Diabetic or Prediabetic)  11/26/2021    Diabetic microalbuminuria test  08/26/2022    Lipid screen  08/26/2022    Potassium monitoring  08/26/2022    Creatinine monitoring  08/26/2022    Colon cancer screen colonoscopy  08/25/2024    Pneumococcal 0-64 years Vaccine (2 of 2 - PPSV23) 04/27/2027    DTaP/Tdap/Td vaccine (2 - Td or Tdap) 06/16/2027    Hepatitis C screen  Addressed    HIV screen  Addressed    Hepatitis A vaccine  Aged Out    Hib vaccine  Aged Out    Meningococcal (ACWY) vaccine  Aged Out       Hemoglobin A1C (%)   Date Value   08/26/2021 9.0   05/03/2021 8.8   01/11/2021 103             ( goal A1C is < 7)   Microalb/Crt.  Ratio (mcg/mg creat)   Date Value   08/26/2021 693 (H)     LDL Cholesterol (mg/dL)   Date Value   08/26/2021 52   02/11/2016 89     LDL Calculated (mg/dL)   Date Value   07/24/2013 120       (goal LDL is <100)   AST (U/L)   Date Value   08/26/2021 10     ALT (U/L)   Date Value   08/26/2021 10     BUN (mg/dL)   Date Value   08/26/2021 18     BP Readings from Last 3 Encounters:   08/26/21 (!) 142/76   07/26/21 (!) 161/84   05/03/21 132/78          (goal 120/80)    All Future Testing planned in [facility]  Lab Frequency Next Occurrence               Patient Active Problem List:     HLD (hyperlipidemia)     Essential hypertension     DM (diabetes mellitus) (HCC)     Chronic back pain     Thoracic aortic aneurysm diagnosed  2010?  at [facility]     Acute pain of right shoulder     Other male erectile dysfunction     Type 2 diabetes mellitus with hyperglycemia

## 2021-12-06 RX ORDER — CLOPIDOGREL BISULFATE 75 MG/1
TABLET ORAL
Qty: 30 TABLET | Refills: 0 | Status: SHIPPED | OUTPATIENT
Start: 2021-12-06 | End: 2022-01-03

## 2021-12-06 NOTE — TELEPHONE ENCOUNTER
plavix pending for refill     Health Maintenance   Topic Date Due    COVID-19 Vaccine (1) Never done    Hepatitis B vaccine (1 of 3 - Risk 3-dose series) Never done    Shingles Vaccine (1 of 2) Never done    Diabetic retinal exam  03/30/2016    Diabetic foot exam  09/18/2018    Annual Wellness Visit (AWV)  Never done    Flu vaccine (1) Never done    A1C test (Diabetic or Prediabetic)  11/26/2021    Diabetic microalbuminuria test  08/26/2022    Lipid screen  08/26/2022    Potassium monitoring  08/26/2022    Creatinine monitoring  08/26/2022    Colon cancer screen colonoscopy  08/25/2024    Pneumococcal 0-64 years Vaccine (2 of 2 - PPSV23) 04/27/2027    DTaP/Tdap/Td vaccine (2 - Td or Tdap) 06/16/2027    Hepatitis C screen  Addressed    HIV screen  Addressed    Hepatitis A vaccine  Aged Out    Hib vaccine  Aged Out    Meningococcal (ACWY) vaccine  Aged Out             (applicable per patient's age: Cancer Screenings, Depression Screening, Fall Risk Screening, Immunizations)    Hemoglobin A1C (%)   Date Value   08/26/2021 9.0   05/03/2021 8.8   01/11/2021 103     Microalb/Crt.  Ratio (mcg/mg creat)   Date Value   08/26/2021 693 (H)     LDL Cholesterol (mg/dL)   Date Value   08/26/2021 52     LDL Calculated (mg/dL)   Date Value   07/24/2013 120     AST (U/L)   Date Value   08/26/2021 10     ALT (U/L)   Date Value   08/26/2021 10     BUN (mg/dL)   Date Value   08/26/2021 18      (goal A1C is < 7)   (goal LDL is <100) need 30-50% reduction from baseline     BP Readings from Last 3 Encounters:   08/26/21 (!) 142/76   07/26/21 (!) 161/84   05/03/21 132/78    (goal /80)      All Future Testing planned in [facility]:  Lab Frequency Next Occurrence       Next Visit Date:  Future Appointments   Date Time Provider Cuca Leigh   12/9/2021  2:00 PM Denice Oleary MD [address] [facility]            Patient Active Problem List:     HLD (hyperlipidemia)     Essential hypertension     [facility]     Chronic back pain     Thoracic aortic aneurysm diagnosed  2010?  at [facility]     Acute pain of right shoulder     Other male erectile dysfunction     Type 2 diabetes mellitus with hyperglycemia

## 2021-12-30 DIAGNOSIS — M25.511 ACUTE PAIN OF RIGHT SHOULDER: ICD-10-CM

## 2021-12-31 DIAGNOSIS — Z86.73 HISTORY OF STROKE: ICD-10-CM

## 2022-01-03 RX ORDER — CLOPIDOGREL BISULFATE 75 MG/1
TABLET ORAL
Qty: 30 TABLET | Refills: 0 | Status: SHIPPED | OUTPATIENT
Start: 2022-01-03 | End: 2022-01-24 | Stop reason: SDUPTHER

## 2022-01-03 RX ORDER — IBUPROFEN 800 MG/1
TABLET ORAL
Qty: 120 TABLET | Refills: 0 | Status: SHIPPED | OUTPATIENT
Start: 2022-01-03 | End: 2022-02-08

## 2022-01-03 NOTE — TELEPHONE ENCOUNTER
Last visit: 8/26/21  Last Med refill: 12/16/21  Does patient have enough medication for 72 hours: Yes    Next Visit Date:  No future appointments. Health Maintenance   Topic Date Due    COVID-19 Vaccine (1) Never done    Hepatitis B vaccine (1 of 3 - Risk 3-dose series) Never done    Shingles Vaccine (1 of 2) Never done    Diabetic retinal exam  03/30/2016    Diabetic foot exam  09/18/2018    Annual Wellness Visit (AWV)  Never done    Flu vaccine (1) Never done    A1C test (Diabetic or Prediabetic)  11/26/2021    Depression Screen  01/11/2022    Diabetic microalbuminuria test  08/26/2022    Lipid screen  08/26/2022    Potassium monitoring  08/26/2022    Creatinine monitoring  08/26/2022    Colon cancer screen colonoscopy  08/25/2024    Pneumococcal 0-64 years Vaccine (2 of 2 - PPSV23) 04/27/2027    DTaP/Tdap/Td vaccine (2 - Td or Tdap) 06/16/2027    Hepatitis C screen  Addressed    HIV screen  Addressed    Hepatitis A vaccine  Aged Out    Hib vaccine  Aged Out    Meningococcal (ACWY) vaccine  Aged Out       Hemoglobin A1C (%)   Date Value   08/26/2021 9.0   05/03/2021 8.8   01/11/2021 103             ( goal A1C is < 7)   Microalb/Crt. Ratio (mcg/mg creat)   Date Value   08/26/2021 693 (H)     LDL Cholesterol (mg/dL)   Date Value   08/26/2021 52   02/11/2016 89     LDL Calculated (mg/dL)   Date Value   07/24/2013 120       (goal LDL is <100)   AST (U/L)   Date Value   08/26/2021 10     ALT (U/L)   Date Value   08/26/2021 10     BUN (mg/dL)   Date Value   08/26/2021 18     BP Readings from Last 3 Encounters:   08/26/21 (!) 142/76   07/26/21 (!) 161/84   05/03/21 132/78          (goal 120/80)    All Future Testing planned in [facility]  Lab Frequency Next Occurrence               Patient Active Problem List:     HLD (hyperlipidemia)     Essential hypertension     DM (diabetes mellitus) (HCC)     Chronic back pain     Thoracic aortic aneurysm diagnosed  2010?  at [facility]     Acute pain of right shoulder     Other male erectile dysfunction     Type 2 diabetes mellitus with hyperglycemia

## 2022-01-03 NOTE — TELEPHONE ENCOUNTER
Left message for patient to call for an appointment    Last visit: 8/26/21   Last Med refill: 12/16/21  Does patient have enough medication for 72 hours: Yes    Next Visit Date:  No future appointments. Health Maintenance   Topic Date Due    COVID-19 Vaccine (1) Never done    Hepatitis B vaccine (1 of 3 - Risk 3-dose series) Never done    Shingles Vaccine (1 of 2) Never done    Diabetic retinal exam  03/30/2016    Diabetic foot exam  09/18/2018    Annual Wellness Visit (AWV)  Never done    Flu vaccine (1) Never done    A1C test (Diabetic or Prediabetic)  11/26/2021    Depression Screen  01/11/2022    Diabetic microalbuminuria test  08/26/2022    Lipid screen  08/26/2022    Potassium monitoring  08/26/2022    Creatinine monitoring  08/26/2022    Colon cancer screen colonoscopy  08/25/2024    Pneumococcal 0-64 years Vaccine (2 of 2 - PPSV23) 04/27/2027    DTaP/Tdap/Td vaccine (2 - Td or Tdap) 06/16/2027    Hepatitis C screen  Addressed    HIV screen  Addressed    Hepatitis A vaccine  Aged Out    Hib vaccine  Aged Out    Meningococcal (ACWY) vaccine  Aged Out       Hemoglobin A1C (%)   Date Value   08/26/2021 9.0   05/03/2021 8.8   01/11/2021 103             ( goal A1C is < 7)   Microalb/Crt.  Ratio (mcg/mg creat)   Date Value   08/26/2021 693 (H)     LDL Cholesterol (mg/dL)   Date Value   08/26/2021 52   02/11/2016 89     LDL Calculated (mg/dL)   Date Value   07/24/2013 120       (goal LDL is <100)   AST (U/L)   Date Value   08/26/2021 10     ALT (U/L)   Date Value   08/26/2021 10     BUN (mg/dL)   Date Value   08/26/2021 18     BP Readings from Last 3 Encounters:   08/26/21 (!) 142/76   07/26/21 (!) 161/84   05/03/21 132/78          (goal 120/80)    All Future Testing planned in [facility]  Lab Frequency Next Occurrence               Patient Active Problem List:     HLD (hyperlipidemia)     Essential hypertension     DM (diabetes mellitus) (HCC)     Chronic back pain     Thoracic aortic aneurysm diagnosed  2010?  at [facility]     Acute pain of right shoulder     Other male erectile dysfunction     Type 2 diabetes mellitus with hyperglycemia

## 2022-01-23 DIAGNOSIS — I10 ESSENTIAL HYPERTENSION: ICD-10-CM

## 2022-01-24 RX ORDER — HYDRALAZINE HYDROCHLORIDE 10 MG/1
TABLET, FILM COATED ORAL
Qty: 90 TABLET | Refills: 1 | Status: SHIPPED | OUTPATIENT
Start: 2022-01-24 | End: 2022-02-22 | Stop reason: SDUPTHER

## 2022-01-24 RX ORDER — LISINOPRIL 40 MG/1
TABLET ORAL
Qty: 90 TABLET | Refills: 1 | Status: SHIPPED | OUTPATIENT
Start: 2022-01-24 | End: 2022-07-29

## 2022-01-24 NOTE — TELEPHONE ENCOUNTER
Attempting to reach patient to schedule appointment. Recording states person your trying to reach cant be reached at this time. Please address the medication refill and close the encounter. If I can be of assistance, please route to the applicable pool. Thank you. Last visit: 8-26-21  Last Med refill: 10/21/21 lisinopril   10/21/21 hydralazine  Does patient have enough medication for 72 hours: No:     Next Visit Date:  No future appointments. Health Maintenance   Topic Date Due    COVID-19 Vaccine (1) Never done    Depression Screen  Never done    Hepatitis B vaccine (1 of 3 - Risk 3-dose series) Never done    Shingles Vaccine (1 of 2) Never done    Diabetic retinal exam  03/30/2016    Diabetic foot exam  09/18/2018    Annual Wellness Visit (AWV)  Never done    Flu vaccine (1) Never done    A1C test (Diabetic or Prediabetic)  11/26/2021    Diabetic microalbuminuria test  08/26/2022    Lipid screen  08/26/2022    Potassium monitoring  08/26/2022    Creatinine monitoring  08/26/2022    Colon cancer screen colonoscopy  08/25/2024    Pneumococcal 0-64 years Vaccine (2 of 2 - PPSV23) 04/27/2027    DTaP/Tdap/Td vaccine (2 - Td or Tdap) 06/16/2027    Hepatitis C screen  Addressed    HIV screen  Addressed    Hepatitis A vaccine  Aged Out    Hib vaccine  Aged Out    Meningococcal (ACWY) vaccine  Aged Out       Hemoglobin A1C (%)   Date Value   08/26/2021 9.0   05/03/2021 8.8   01/11/2021 103             ( goal A1C is < 7)   Microalb/Crt.  Ratio (mcg/mg creat)   Date Value   08/26/2021 693 (H)     LDL Cholesterol (mg/dL)   Date Value   08/26/2021 52   02/11/2016 89     LDL Calculated (mg/dL)   Date Value   07/24/2013 120       (goal LDL is <100)   AST (U/L)   Date Value   08/26/2021 10     ALT (U/L)   Date Value   08/26/2021 10     BUN (mg/dL)   Date Value   08/26/2021 18     BP Readings from Last 3 Encounters:   08/26/21 (!) 142/76   07/26/21 (!) 161/84   05/03/21 132/78 (goal 120/80)    All Future Testing planned in [facility]  Lab Frequency Next Occurrence               Patient Active Problem List:     HLD (hyperlipidemia)     Essential hypertension     DM (diabetes mellitus) ([facility] [address]     Chronic back pain     Thoracic aortic aneurysm diagnosed  2010?  at [facility]     Acute pain of right shoulder     Other male erectile dysfunction     Type 2 diabetes mellitus with hyperglycemia

## 2022-02-07 DIAGNOSIS — M25.511 ACUTE PAIN OF RIGHT SHOULDER: ICD-10-CM

## 2022-02-08 RX ORDER — IBUPROFEN 800 MG/1
TABLET ORAL
Qty: 120 TABLET | Refills: 0 | Status: SHIPPED | OUTPATIENT
Start: 2022-02-08 | End: 2022-03-01

## 2022-02-08 NOTE — TELEPHONE ENCOUNTER
E-scribe request for med refills. Please review and e-scribe if applicable. Last Visit Date:  8/26/21  Next Visit Date:  Visit date not found    Hemoglobin A1C (%)   Date Value   08/26/2021 9.0   05/03/2021 8.8   01/11/2021 103             ( goal A1C is < 7)   Microalb/Crt. Ratio (mcg/mg creat)   Date Value   08/26/2021 693 (H)     LDL Cholesterol (mg/dL)   Date Value   08/26/2021 52     LDL Calculated (mg/dL)   Date Value   07/24/2013 120       (goal LDL is <100)   AST (U/L)   Date Value   08/26/2021 10     ALT (U/L)   Date Value   08/26/2021 10     BUN (mg/dL)   Date Value   08/26/2021 18     BP Readings from Last 3 Encounters:   08/26/21 (!) 142/76   07/26/21 (!) 161/84   05/03/21 132/78          (goal 120/80)        Patient Active Problem List:     HLD (hyperlipidemia)     Essential hypertension     DM (diabetes mellitus) ([facility] [address]     Chronic back pain     Thoracic aortic aneurysm diagnosed  2010?  at [facility]     Acute pain of right shoulder     Other male erectile dysfunction     Type 2 diabetes mellitus with hyperglycemia      ----Drew Lambert

## 2022-02-22 DIAGNOSIS — I10 ESSENTIAL HYPERTENSION: ICD-10-CM

## 2022-02-22 RX ORDER — HYDRALAZINE HYDROCHLORIDE 10 MG/1
TABLET, FILM COATED ORAL
Qty: 90 TABLET | Refills: 1 | Status: SHIPPED | OUTPATIENT
Start: 2022-02-22 | End: 2022-05-12

## 2022-02-22 NOTE — TELEPHONE ENCOUNTER
E-scribe request for med refill. Please review and e-scribe if applicable. Last Visit Date:  08/26/2021  Next Visit Date:  Visit date not found    Hemoglobin A1C (%)   Date Value   08/26/2021 9.0   05/03/2021 8.8   01/11/2021 103             ( goal A1C is < 7)   Microalb/Crt. Ratio (mcg/mg creat)   Date Value   08/26/2021 693 (H)     LDL Cholesterol (mg/dL)   Date Value   08/26/2021 52     LDL Calculated (mg/dL)   Date Value   07/24/2013 120       (goal LDL is <100)   AST (U/L)   Date Value   08/26/2021 10     ALT (U/L)   Date Value   08/26/2021 10     BUN (mg/dL)   Date Value   08/26/2021 18     BP Readings from Last 3 Encounters:   08/26/21 (!) 142/76   07/26/21 (!) 161/84   05/03/21 132/78          (goal 120/80)        Patient Active Problem List:     HLD (hyperlipidemia)     Essential hypertension     DM (diabetes mellitus) ([facility] [address]     Chronic back pain     Thoracic aortic aneurysm diagnosed  2010?  at [facility]     Acute pain of right shoulder     Other male erectile dysfunction     Type 2 diabetes mellitus with hyperglycemia      ----Rachel Colon

## 2022-03-01 DIAGNOSIS — I10 ESSENTIAL HYPERTENSION: ICD-10-CM

## 2022-03-01 DIAGNOSIS — Z86.73 HISTORY OF STROKE: ICD-10-CM

## 2022-03-01 DIAGNOSIS — M25.511 ACUTE PAIN OF RIGHT SHOULDER: ICD-10-CM

## 2022-03-01 RX ORDER — AMLODIPINE BESYLATE 10 MG/1
TABLET ORAL
Qty: 30 TABLET | Refills: 5 | Status: SHIPPED | OUTPATIENT
Start: 2022-03-01 | End: 2022-08-16

## 2022-03-01 RX ORDER — METOPROLOL TARTRATE 100 MG/1
TABLET ORAL
Qty: 60 TABLET | Refills: 0 | Status: SHIPPED | OUTPATIENT
Start: 2022-03-01 | End: 2022-04-08

## 2022-03-01 RX ORDER — CLOPIDOGREL BISULFATE 75 MG/1
TABLET ORAL
Qty: 30 TABLET | Refills: 5 | Status: SHIPPED | OUTPATIENT
Start: 2022-03-01 | End: 2022-08-26

## 2022-03-01 RX ORDER — IBUPROFEN 800 MG/1
TABLET ORAL
Qty: 120 TABLET | Refills: 3 | Status: SHIPPED | OUTPATIENT
Start: 2022-03-01 | End: 2022-06-14

## 2022-03-01 NOTE — TELEPHONE ENCOUNTER
Please address the medication refill and close the encounter. If I can be of assistance, please route to the applicable pool. Thank you. Last visit: 08/26/21  Last Med refill: 02/08/22  Does patient have enough medication for 72 hours: Yes - ATTEMPTED TO CONTACT PATIENT TO SCHEDULE AAPT. PHONE CURRENTLY NOT IN SERVICE. Next Visit Date:  No future appointments. Health Maintenance   Topic Date Due    COVID-19 Vaccine (1) Never done    Depression Screen  Never done    Hepatitis B vaccine (1 of 3 - Risk 3-dose series) Never done    Shingles Vaccine (1 of 2) Never done    Diabetic retinal exam  03/30/2016    Diabetic foot exam  09/18/2018    Annual Wellness Visit (AWV)  Never done    Flu vaccine (1) Never done    A1C test (Diabetic or Prediabetic)  11/26/2021    Diabetic microalbuminuria test  08/26/2022    Lipid screen  08/26/2022    Potassium monitoring  08/26/2022    Creatinine monitoring  08/26/2022    Colorectal Cancer Screen  08/25/2024    Pneumococcal 0-64 years Vaccine (2 of 2 - PPSV23) 04/27/2027    DTaP/Tdap/Td vaccine (2 - Td or Tdap) 06/16/2027    Hepatitis C screen  Addressed    HIV screen  Addressed    Hepatitis A vaccine  Aged Out    Hib vaccine  Aged Out    Meningococcal (ACWY) vaccine  Aged Out       Hemoglobin A1C (%)   Date Value   08/26/2021 9.0   05/03/2021 8.8   01/11/2021 103             ( goal A1C is < 7)   Microalb/Crt.  Ratio (mcg/mg creat)   Date Value   08/26/2021 693 (H)     LDL Cholesterol (mg/dL)   Date Value   08/26/2021 52   02/11/2016 89     LDL Calculated (mg/dL)   Date Value   07/24/2013 120       (goal LDL is <100)   AST (U/L)   Date Value   08/26/2021 10     ALT (U/L)   Date Value   08/26/2021 10     BUN (mg/dL)   Date Value   08/26/2021 18     BP Readings from Last 3 Encounters:   08/26/21 (!) 142/76   07/26/21 (!) 161/84   05/03/21 132/78          (goal 120/80)    All Future Testing planned in [facility]  Lab Frequency Next Occurrence Patient Active Problem List:     HLD (hyperlipidemia)     Essential hypertension     DM (diabetes mellitus) ([facility] [address]     Chronic back pain     Thoracic aortic aneurysm diagnosed  2010?  at [facility]     Acute pain of right shoulder     Other male erectile dysfunction     Type 2 diabetes mellitus with hyperglycemia

## 2022-03-01 NOTE — TELEPHONE ENCOUNTER
Please address the medication refill and close the encounter. If I can be of assistance, please route to the applicable pool. Thank you. Last visit: 08/26/21  Last Med refill: 01/22/24  Does patient have enough medication for 72 hours: No: ATTEMPTED TO CONTACT PATIENT TO SCHEDULE APPT. PATIENT NUMBER NOT IN SERVICE    Next Visit Date:  No future appointments. Health Maintenance   Topic Date Due    COVID-19 Vaccine (1) Never done    Depression Screen  Never done    Hepatitis B vaccine (1 of 3 - Risk 3-dose series) Never done    Shingles Vaccine (1 of 2) Never done    Diabetic retinal exam  03/30/2016    Diabetic foot exam  09/18/2018    Annual Wellness Visit (AWV)  Never done    Flu vaccine (1) Never done    A1C test (Diabetic or Prediabetic)  11/26/2021    Diabetic microalbuminuria test  08/26/2022    Lipid screen  08/26/2022    Potassium monitoring  08/26/2022    Creatinine monitoring  08/26/2022    Colorectal Cancer Screen  08/25/2024    Pneumococcal 0-64 years Vaccine (2 of 2 - PPSV23) 04/27/2027    DTaP/Tdap/Td vaccine (2 - Td or Tdap) 06/16/2027    Hepatitis C screen  Addressed    HIV screen  Addressed    Hepatitis A vaccine  Aged Out    Hib vaccine  Aged Out    Meningococcal (ACWY) vaccine  Aged Out       Hemoglobin A1C (%)   Date Value   08/26/2021 9.0   05/03/2021 8.8   01/11/2021 103             ( goal A1C is < 7)   Microalb/Crt.  Ratio (mcg/mg creat)   Date Value   08/26/2021 693 (H)     LDL Cholesterol (mg/dL)   Date Value   08/26/2021 52   02/11/2016 89     LDL Calculated (mg/dL)   Date Value   07/24/2013 120       (goal LDL is <100)   AST (U/L)   Date Value   08/26/2021 10     ALT (U/L)   Date Value   08/26/2021 10     BUN (mg/dL)   Date Value   08/26/2021 18     BP Readings from Last 3 Encounters:   08/26/21 (!) 142/76   07/26/21 (!) 161/84   05/03/21 132/78          (goal 120/80)    All Future Testing planned in [facility]  Lab Frequency Next Occurrence               Patient Active Problem List:     HLD (hyperlipidemia)     Essential hypertension     DM (diabetes mellitus) ([facility] [address]     Chronic back pain     Thoracic aortic aneurysm diagnosed  2010?  at [facility]     Acute pain of right shoulder     Other male erectile dysfunction     Type 2 diabetes mellitus with hyperglycemia

## 2022-03-17 ENCOUNTER — TELEPHONE (OUTPATIENT)
Dept: FAMILY MEDICINE CLINIC | Age: 62
End: 2022-03-17

## 2022-03-17 NOTE — TELEPHONE ENCOUNTER
[facility] contacted office to verfiy if patient was a patient at this location, dont show [facility] as his insurance. She stated will fax over paperwork.

## 2022-04-08 RX ORDER — METOPROLOL TARTRATE 100 MG/1
TABLET ORAL
Qty: 60 TABLET | Refills: 10 | Status: SHIPPED | OUTPATIENT
Start: 2022-04-08

## 2022-04-08 RX ORDER — GLIPIZIDE 10 MG/1
TABLET, FILM COATED, EXTENDED RELEASE ORAL
Qty: 60 TABLET | Refills: 0 | Status: SHIPPED | OUTPATIENT
Start: 2022-04-08 | End: 2022-05-18 | Stop reason: SDUPTHER

## 2022-04-08 NOTE — TELEPHONE ENCOUNTER
Please review and address medication refill , if i can be an assistance be route to acceptable pool. Thank you. Last visit: 08/26/2021  Last Med refill: 12/03/2021  Does patient have enough medication for 72 hours: No: attempted to contact patient to schedule appointment. Patient's phone disconnected     Next Visit Date:  No future appointments. Health Maintenance   Topic Date Due    COVID-19 Vaccine (1) Never done    Hepatitis B vaccine (1 of 3 - Risk 3-dose series) Never done    Shingles Vaccine (1 of 2) Never done    Diabetic retinal exam  03/30/2016    Diabetic foot exam  09/18/2018    Annual Wellness Visit (AWV)  Never done    A1C test (Diabetic or Prediabetic)  11/26/2021    Depression Screen  01/11/2022    Diabetic microalbuminuria test  08/26/2022    Lipid screen  08/26/2022    Potassium monitoring  08/26/2022    Creatinine monitoring  08/26/2022    Flu vaccine (Season Ended) 09/01/2022    Colorectal Cancer Screen  08/25/2024    Pneumococcal 0-64 years Vaccine (2 of 2 - PPSV23) 04/27/2027    DTaP/Tdap/Td vaccine (2 - Td or Tdap) 06/16/2027    Hepatitis C screen  Addressed    HIV screen  Addressed    Hepatitis A vaccine  Aged Out    Hib vaccine  Aged Out    Meningococcal (ACWY) vaccine  Aged Out       Hemoglobin A1C (%)   Date Value   08/26/2021 9.0   05/03/2021 8.8   01/11/2021 103             ( goal A1C is < 7)   Microalb/Crt.  Ratio (mcg/mg creat)   Date Value   08/26/2021 693 (H)     LDL Cholesterol (mg/dL)   Date Value   08/26/2021 52   02/11/2016 89     LDL Calculated (mg/dL)   Date Value   07/24/2013 120       (goal LDL is <100)   AST (U/L)   Date Value   08/26/2021 10     ALT (U/L)   Date Value   08/26/2021 10     BUN (mg/dL)   Date Value   08/26/2021 18     BP Readings from Last 3 Encounters:   08/26/21 (!) 142/76   07/26/21 (!) 161/84   05/03/21 132/78          (goal 120/80)    All Future Testing planned in [facility]  Lab Frequency Next Occurrence               Patient Active Problem List:     HLD (hyperlipidemia)     Essential hypertension     DM (diabetes mellitus) ([facility] [address]     Chronic back pain     Thoracic aortic aneurysm diagnosed  2010?  at [facility]     Acute pain of right shoulder     Other male erectile dysfunction     Type 2 diabetes mellitus with hyperglycemia

## 2022-04-08 NOTE — TELEPHONE ENCOUNTER
E-scribe request for med refill. Please review and e-scribe if applicable. Last Visit Date:  8/26/21  Next Visit Date:  Visit date not found    Hemoglobin A1C (%)   Date Value   08/26/2021 9.0   05/03/2021 8.8   01/11/2021 103             ( goal A1C is < 7)   Microalb/Crt. Ratio (mcg/mg creat)   Date Value   08/26/2021 693 (H)     LDL Cholesterol (mg/dL)   Date Value   08/26/2021 52     LDL Calculated (mg/dL)   Date Value   07/24/2013 120       (goal LDL is <100)   AST (U/L)   Date Value   08/26/2021 10     ALT (U/L)   Date Value   08/26/2021 10     BUN (mg/dL)   Date Value   08/26/2021 18     BP Readings from Last 3 Encounters:   08/26/21 (!) 142/76   07/26/21 (!) 161/84   05/03/21 132/78          (goal 120/80)        Patient Active Problem List:     HLD (hyperlipidemia)     Essential hypertension     DM (diabetes mellitus) ([facility] [address]     Chronic back pain     Thoracic aortic aneurysm diagnosed  2010?  at [facility]     Acute pain of right shoulder     Other male erectile dysfunction     Type 2 diabetes mellitus with hyperglycemia      ----Leonard Hull

## 2022-05-05 DIAGNOSIS — I10 ESSENTIAL HYPERTENSION: ICD-10-CM

## 2022-05-06 RX ORDER — GLIPIZIDE 10 MG/1
TABLET, FILM COATED, EXTENDED RELEASE ORAL
Qty: 60 TABLET | Refills: 10 | OUTPATIENT
Start: 2022-05-06

## 2022-05-06 RX ORDER — HYDROCHLOROTHIAZIDE 25 MG/1
TABLET ORAL
Qty: 30 TABLET | Refills: 10 | OUTPATIENT
Start: 2022-05-06

## 2022-05-06 NOTE — TELEPHONE ENCOUNTER
E-scribe request for med refill. Please review and e-scribe if applicable. Last Visit Date:  08/26/2021  Next Visit Date:  5/12/2022    Hemoglobin A1C (%)   Date Value   08/26/2021 9.0   05/03/2021 8.8   01/11/2021 103             ( goal A1C is < 7)   Microalb/Crt. Ratio (mcg/mg creat)   Date Value   08/26/2021 693 (H)     LDL Cholesterol (mg/dL)   Date Value   08/26/2021 52     LDL Calculated (mg/dL)   Date Value   07/24/2013 120       (goal LDL is <100)   AST (U/L)   Date Value   08/26/2021 10     ALT (U/L)   Date Value   08/26/2021 10     BUN (mg/dL)   Date Value   08/26/2021 18     BP Readings from Last 3 Encounters:   08/26/21 (!) 142/76   07/26/21 (!) 161/84   05/03/21 132/78          (goal 120/80)        Patient Active Problem List:     HLD (hyperlipidemia)     Essential hypertension     DM (diabetes mellitus) ([facility] [address]     Chronic back pain     Thoracic aortic aneurysm diagnosed  2010?  at [facility]     Acute pain of right shoulder     Other male erectile dysfunction     Type 2 diabetes mellitus with hyperglycemia      ----Crystal Raya

## 2022-05-12 ENCOUNTER — OFFICE VISIT (OUTPATIENT)
Dept: FAMILY MEDICINE CLINIC | Age: 62
End: 2022-05-12
Payer: MEDICARE

## 2022-05-12 VITALS
SYSTOLIC BLOOD PRESSURE: 141 MMHG | TEMPERATURE: 97.1 F | BODY MASS INDEX: 29.1 KG/M2 | HEIGHT: 68 IN | HEART RATE: 67 BPM | WEIGHT: 192 LBS | DIASTOLIC BLOOD PRESSURE: 80 MMHG

## 2022-05-12 DIAGNOSIS — Z79.4 TYPE 2 DIABETES MELLITUS WITH HYPERGLYCEMIA, WITH LONG-TERM CURRENT USE OF INSULIN (HCC): Primary | ICD-10-CM

## 2022-05-12 DIAGNOSIS — M54.50 CHRONIC LUMBOSACRAL PAIN: ICD-10-CM

## 2022-05-12 DIAGNOSIS — I10 ESSENTIAL HYPERTENSION: ICD-10-CM

## 2022-05-12 DIAGNOSIS — G89.29 CHRONIC LUMBOSACRAL PAIN: ICD-10-CM

## 2022-05-12 DIAGNOSIS — E11.65 TYPE 2 DIABETES MELLITUS WITH HYPERGLYCEMIA, WITH LONG-TERM CURRENT USE OF INSULIN (HCC): Primary | ICD-10-CM

## 2022-05-12 DIAGNOSIS — L60.0 INGROWN TOENAIL OF RIGHT FOOT: ICD-10-CM

## 2022-05-12 LAB — HBA1C MFR BLD: 6.5 %

## 2022-05-12 PROCEDURE — 1036F TOBACCO NON-USER: CPT | Performed by: STUDENT IN AN ORGANIZED HEALTH CARE EDUCATION/TRAINING PROGRAM

## 2022-05-12 PROCEDURE — 3017F COLORECTAL CA SCREEN DOC REV: CPT | Performed by: STUDENT IN AN ORGANIZED HEALTH CARE EDUCATION/TRAINING PROGRAM

## 2022-05-12 PROCEDURE — 2022F DILAT RTA XM EVC RTNOPTHY: CPT | Performed by: STUDENT IN AN ORGANIZED HEALTH CARE EDUCATION/TRAINING PROGRAM

## 2022-05-12 PROCEDURE — G8427 DOCREV CUR MEDS BY ELIG CLIN: HCPCS | Performed by: STUDENT IN AN ORGANIZED HEALTH CARE EDUCATION/TRAINING PROGRAM

## 2022-05-12 PROCEDURE — 3044F HG A1C LEVEL LT 7.0%: CPT | Performed by: STUDENT IN AN ORGANIZED HEALTH CARE EDUCATION/TRAINING PROGRAM

## 2022-05-12 PROCEDURE — G8417 CALC BMI ABV UP PARAM F/U: HCPCS | Performed by: STUDENT IN AN ORGANIZED HEALTH CARE EDUCATION/TRAINING PROGRAM

## 2022-05-12 PROCEDURE — 99213 OFFICE O/P EST LOW 20 MIN: CPT | Performed by: STUDENT IN AN ORGANIZED HEALTH CARE EDUCATION/TRAINING PROGRAM

## 2022-05-12 PROCEDURE — 83036 HEMOGLOBIN GLYCOSYLATED A1C: CPT | Performed by: STUDENT IN AN ORGANIZED HEALTH CARE EDUCATION/TRAINING PROGRAM

## 2022-05-12 RX ORDER — ACETAMINOPHEN 500 MG
1000 TABLET ORAL EVERY 6 HOURS PRN
Qty: 120 TABLET | Refills: 3 | Status: SHIPPED | OUTPATIENT
Start: 2022-05-12 | End: 2022-06-14 | Stop reason: SDUPTHER

## 2022-05-12 RX ORDER — LIDOCAINE 50 MG/G
1 PATCH TOPICAL DAILY
Qty: 30 PATCH | Refills: 1 | Status: SHIPPED | OUTPATIENT
Start: 2022-05-12 | End: 2022-06-30 | Stop reason: SDUPTHER

## 2022-05-12 RX ORDER — HYDROCHLOROTHIAZIDE 50 MG/1
TABLET ORAL
Qty: 90 TABLET | Refills: 1 | Status: SHIPPED | OUTPATIENT
Start: 2022-05-12 | End: 2022-10-15

## 2022-05-12 ASSESSMENT — ENCOUNTER SYMPTOMS
BACK PAIN: 1
ABDOMINAL PAIN: 0
SHORTNESS OF BREATH: 0
WHEEZING: 0
SORE THROAT: 0
CONSTIPATION: 0
NAUSEA: 0
COUGH: 0
RHINORRHEA: 0
VOMITING: 0
DIARRHEA: 0

## 2022-05-12 ASSESSMENT — PATIENT HEALTH QUESTIONNAIRE - PHQ9
SUM OF ALL RESPONSES TO PHQ QUESTIONS 1-9: 0
1. LITTLE INTEREST OR PLEASURE IN DOING THINGS: 0
SUM OF ALL RESPONSES TO PHQ9 QUESTIONS 1 & 2: 0
2. FEELING DOWN, DEPRESSED OR HOPELESS: 0
SUM OF ALL RESPONSES TO PHQ QUESTIONS 1-9: 0

## 2022-05-12 NOTE — PATIENT INSTRUCTIONS
HYDRALAZINE - TAKE 1 PILL A DAY FOR 1 WEEK, THEN STOP    HYDROCHLOROTHIAZIDE WAS INCREASED TO 50MG           Thank you for letting us take care of you today. We hope all your questions were addressed. If a question was overlooked or something else comes to mind after you return home, please contact a member of your Care Team listed below. Your Care Team at [place] [address] is Team #2  Sylvia Livingston DO (Faculty)  Veronica Irizarry (Faculty)  Rosibel Edwards MD (Resident)  Yann Ordoñez MD (Resident)  Clary Meraz MD (Resident)  Suman Amezcua MD (Resident)  Win Kennedy., A  Monique Allen.,  MA  Han Robles., [facility] office)  Solis Morillo, [address] [address] (Clinical Practice Manager)  Joby Alvarado [facility] (Clinical Pharmacist)     Office phone number: [phone]    If you need to get in right away due to illness, please be advised we have \"Same Day\" appointments available Monday-Friday. Please call us at [phone] option #3 to schedule your \"Same Day\" appointment.

## 2022-05-12 NOTE — PROGRESS NOTES
Attending Physician Statement  I have discussed the care of Mary Espinal, 63 y.o. male,including pertinent history and exam findings,  with the resident Dr. Miriam Panda MD.  History:  Chief Complaint   Patient presents with    Back Pain     Lower left side     Diabetes     Patient is here for follow up on type II DM and hypertension. I have reviewed the key elements of the encounter with the resident. Examination was done by resident as documented in residents note. BP Readings from Last 3 Encounters:   05/12/22 (!) 141/80   08/26/21 (!) 142/76   07/26/21 (!) 161/84     BP (!) 141/80 (Site: Right Upper Arm, Position: Sitting, Cuff Size: Large Adult)   Pulse 67   Temp 97.1 °F (36.2 °C) (Temporal)   Ht 5' 7.99\" (1.727 m)   Wt 192 lb (87.1 kg)   BMI 29.20 kg/m²   Lab Results   Component Value Date    WBC 7.4 02/24/2018    HGB 11.5 (L) 02/24/2018    HCT 37.7 (L) 02/24/2018     02/24/2018    CHOL 112 08/26/2021    TRIG 137 08/26/2021    HDL 33 (L) 08/26/2021    ALT 10 08/26/2021    AST 10 08/26/2021     08/26/2021    K 4.4 08/26/2021     08/26/2021    CREATININE 1.07 08/26/2021    BUN 18 08/26/2021    CO2 24 08/26/2021    LABA1C 6.5 05/12/2022    LABMICR 693 (H) 08/26/2021     Lab Results   Component Value Date    CALCIUM 9.5 08/26/2021     Lab Results   Component Value Date    LDLCALC 120 07/24/2013    LDLCHOLESTEROL 52 08/26/2021     I agree with the assessment, plan and diagnosis of    Diagnosis Orders   1. Type 2 diabetes mellitus with hyperglycemia, with long-term current use of insulin (HCC)  POCT glycosylated hemoglobin (Hb A1C)   2. Essential hypertension  hydroCHLOROthiazide (HYDRODIURIL) 50 MG tablet   3.  Chronic lumbosacral pain  [facility]    acetaminophen (APAP EXTRA STRENGTH) 500 MG tablet    lidocaine (LIDODERM) 5 %    XR LUMBAR SPINE (2-3 VIEWS)   4. Ingrown toenail of right foot  [address]     I agree with  orders as documented by the resident. Recommendations:   Patient was counseled, exam is benign, antihypertensive regimen adjusted with addition of HCTZ and weaning off Hydralazine. Back pain discussed, appears mechanical without red flags and supportive treatment started. Return in about 4 weeks (around 6/9/2022) for HTN GENO.    (Carin Bustamante ) Dr. Eliecer Hall MD

## 2022-05-12 NOTE — PROGRESS NOTES
[address] [address] - Outpatient Note        Delma Murphy is a 63 y.o. male  presented to the office on 05/12/22:        History of T2DM on glipizide and metformin, A1c is down to 6.5 today. BP is slightly elevated but patient did not take morning BP med. Patient is on lisinopril, metoprolol, amlodipine, hydralazine, HCTZ. We discussed tapering and discontinuing hydralazine and increasing HCTZ. Pt is agreeable. History of back strain patient sustained when he picked up heavy box, this was 10-15 years ago and has not gotten better. Patient denies sciatica, denies tingling, numbness, saddle anesthesia. Denies bladder or bowel dysfunction. History of ingrown toenail and bunion, would like to see podiatry. Diagnosis Orders   1. Type 2 diabetes mellitus with hyperglycemia, with long-term current use of insulin ([facility])  POCT glycosylated hemoglobin (Hb A1C)   2. Essential hypertension  hydroCHLOROthiazide (HYDRODIURIL) 50 MG tablet   3. Chronic lumbosacral pain  [facility]    acetaminophen (APAP EXTRA STRENGTH) 500 MG tablet    lidocaine (LIDODERM) 5 %    XR LUMBAR SPINE (2-3 VIEWS)   4. Ingrown toenail of right foot  [facility] [address]:  1. Taper and stop hydralazine  2. Increase HCTZ, consider aldactone if BP needs better control  3. Imaging of lumbar spine           Review of Systems   Constitutional: Negative for chills, diaphoresis and fever. HENT: Negative for congestion, rhinorrhea and sore throat. Respiratory: Negative for cough, shortness of breath and wheezing. Cardiovascular: Negative for chest pain, palpitations and leg swelling. Gastrointestinal: Negative for abdominal pain, constipation, diarrhea, nausea and vomiting. Genitourinary: Negative for difficulty urinating, dysuria and frequency. Musculoskeletal: Positive for back pain.    Neurological: Negative for dizziness, light-headedness and headaches. Vitals:    05/12/22 1102   BP: (!) 141/80   Pulse:    Temp:              Physical Exam  Vitals and nursing note reviewed. Constitutional:       General: He is not in acute distress. Eyes:      Extraocular Movements: Extraocular movements intact. Conjunctiva/sclera: Conjunctivae normal.   Cardiovascular:      Rate and Rhythm: Normal rate and regular rhythm. Pulses: Normal pulses. Heart sounds: Murmur (loud systolic murmur) heard. Pulmonary:      Effort: Pulmonary effort is normal.      Breath sounds: Normal breath sounds. Abdominal:      General: Bowel sounds are normal. There is no distension. Palpations: Abdomen is soft. Tenderness: There is no abdominal tenderness. There is no guarding. Musculoskeletal:      Right lower leg: No edema. Left lower leg: No edema. Comments: Straight leg raise positive on the left for lower back pain   Neurological:      General: No focal deficit present. Mental Status: He is alert. Return in about 4 weeks (around 6/9/2022) for HTN GENO. Brie Murillo MD  Family Medicine PGY-3  05/12/22 at 11:20 AM 03-Jan-2017 17:37

## 2022-05-12 NOTE — PROGRESS NOTES
Visit Information    Have you changed or started any medications since your last visit including any over-the-counter medicines, vitamins, or herbal medicines? no   Have you stopped taking any of your medications? Is so, why? -  no  Are you having any side effects from any of your medications? - no    Have you seen any other physician or provider since your last visit?  no   Have you had any other diagnostic tests since your last visit?  no   Have you been seen in the emergency room and/or had an admission in a hospital since we last saw you?  no   Have you had your routine dental cleaning in the past 6 months?  no     Do you have an active MyChart account? If no, what is the barrier?   No: inavtive    Patient Care Team:  Huel Severe, MD as PCP - General (Family Medicine)    Medical History Review  Past Medical, Family, and Social History reviewed and does not contribute to the patient presenting condition    Health Maintenance   Topic Date Due    Annual Wellness Visit (AWV)  Never done    COVID-19 Vaccine (1) Never done    Shingles vaccine (1 of 2) Never done    Diabetic retinal exam  03/30/2016    Pneumococcal 0-64 years Vaccine (2 - PCV) 06/16/2018    Diabetic foot exam  09/18/2018    A1C test (Diabetic or Prediabetic)  11/26/2021    Depression Screen  01/11/2022    Diabetic microalbuminuria test  08/26/2022    Lipids  08/26/2022    Flu vaccine (Season Ended) 09/01/2022    Colorectal Cancer Screen  08/25/2024    DTaP/Tdap/Td vaccine (2 - Td or Tdap) 06/16/2027    Hepatitis C screen  Addressed    HIV screen  Addressed    Hepatitis A vaccine  Aged Out    Hib vaccine  Aged Out    Meningococcal (ACWY) vaccine  Aged Out

## 2022-05-16 DIAGNOSIS — I10 ESSENTIAL HYPERTENSION: ICD-10-CM

## 2022-05-16 RX ORDER — GLIPIZIDE 10 MG/1
TABLET, FILM COATED, EXTENDED RELEASE ORAL
Qty: 60 TABLET | Refills: 10 | OUTPATIENT
Start: 2022-05-16

## 2022-05-16 RX ORDER — HYDROCHLOROTHIAZIDE 25 MG/1
TABLET ORAL
Qty: 30 TABLET | Refills: 10 | OUTPATIENT
Start: 2022-05-16

## 2022-05-18 RX ORDER — GLIPIZIDE 10 MG/1
TABLET, FILM COATED, EXTENDED RELEASE ORAL
Qty: 60 TABLET | Refills: 0 | Status: SHIPPED | OUTPATIENT
Start: 2022-05-18 | End: 2022-06-30 | Stop reason: SDUPTHER

## 2022-05-18 NOTE — TELEPHONE ENCOUNTER
Last visit:   Last Med refill:   Does patient have enough medication for 72 hours: No:     Next Visit Date:  Future Appointments   Date Time Provider [place] Lu   5/23/2022  1:15 PM Ashwin Brown DPM [email or web address]   6/14/2022  2:30 PM Ruddy Ferrari, [address] [facility] [facility]   6/30/2022  2:00 PM Shawnie Canavan, MD [address] [facility] Maintenance   Topic Date Due    Annual Wellness Visit (AWV)  Never done    COVID-19 Vaccine (1) Never done    Shingles vaccine (1 of 2) Never done    Diabetic retinal exam  03/30/2016    Pneumococcal 0-64 years Vaccine (2 - PCV) 06/16/2018    Diabetic foot exam  09/18/2018    Diabetic microalbuminuria test  08/26/2022    Lipids  08/26/2022    Flu vaccine (Season Ended) 09/01/2022    A1C test (Diabetic or Prediabetic)  05/12/2023    Depression Screen  05/12/2023    Colorectal Cancer Screen  08/25/2024    DTaP/Tdap/Td vaccine (2 - Td or Tdap) 06/16/2027    Hepatitis C screen  Addressed    HIV screen  Addressed    Hepatitis A vaccine  Aged Out    Hib vaccine  Aged Out    Meningococcal (ACWY) vaccine  Aged Out       Hemoglobin A1C (%)   Date Value   05/12/2022 6.5   08/26/2021 9.0   05/03/2021 8.8             ( goal A1C is < 7)   Microalb/Crt.  Ratio (mcg/mg creat)   Date Value   08/26/2021 693 (H)     LDL Cholesterol (mg/dL)   Date Value   08/26/2021 52   02/11/2016 89     LDL Calculated (mg/dL)   Date Value   07/24/2013 120       (goal LDL is <100)   AST (U/L)   Date Value   08/26/2021 10     ALT (U/L)   Date Value   08/26/2021 10     BUN (mg/dL)   Date Value   08/26/2021 18     BP Readings from Last 3 Encounters:   05/12/22 (!) 141/80   08/26/21 (!) 142/76   07/26/21 (!) 161/84          (goal 120/80)    All Future Testing planned in [facility]  Lab Frequency Next Occurrence   XR LUMBAR SPINE (2-3 VIEWS) Once 05/12/2022               Patient Active Problem List:     HLD (hyperlipidemia)     Essential hypertension     DM (diabetes mellitus) [facility])     Chronic back pain     Thoracic aortic aneurysm diagnosed  2010? at [facility]     Acute pain of right shoulder     Other male erectile dysfunction     Type 2 diabetes mellitus with hyperglycemia           Please address the medication refill and close the encounter. If I can be of assistance, please route to the applicable pool. Thank you.

## 2022-05-19 NOTE — PROGRESS NOTES
Patient instructed to remove shoes and socks and instructed to sit in exam chair. Current PCP is Loris Dancer, MD and date of last visit was 5/12/22. Do you have a follow up visit scheduled? Yes  If yes, the date is 6/14/22    Diabetic visit information    Blood pressure (Control is BP <140/90)  BP Readings from Last 3 Encounters:   05/12/22 (!) 141/80   08/26/21 (!) 142/76   07/26/21 (!) 161/84       BP taken with correct size cuff? - Yes   Repeated if > 140/90 Yes      Tobacco use:  Patient  reports that he quit smoking about 23 years ago. He has a 2.50 pack-year smoking history. He has never used smokeless tobacco.  If Smoker - Cessation materials given? - Yes       Diabetic Health Maintenance Items due  Diabetes Management   Topic Date Due    Diabetic retinal exam  03/30/2016    Diabetic foot exam  09/18/2018       Diabetic retinal exam done in last year? - Yes   If No: remind patient that it is due and they should schedule an exam    Medications  Is patient taking any medications for diabetes? -   Yes  Have blood sugars been controlled? Fasting blood sugars under 120   -   Yes   Random home sugars or today's POCT glucose is under 180 -   Yes   []  If No to the above then patient should schedule appt with PCP.      Diabetic Plan    A1C Plan  Lab Results   Component Value Date    LABA1C 6.5 05/12/2022    LABA1C 9.0 08/26/2021    LABA1C 8.8 05/03/2021      []  If A1C over 8 and last result >3 months ago - Order A1C and refer to PCP   []  If last A1C over 6 months ago - Order A1C and refer to PCP for follow up   []  If elevated blood sugars > 180 - refer to PCP for follow up    []  Blood sugar controlled - A1C under 8 and last check was < 6 months      Cholesterol Plan   Lab Results   Component Value Date    LDLCALC 120 07/24/2013    LDLCHOLESTEROL 52 08/26/2021      []  If LDL > 100 and last result >3 months ago - order Fasting lipids and refer to PCP for follow up   []  If LDL < 100 and over 1 year ago - Order Fasting lipids and refer to PCP for follow up   [] LDL is controlled. LDL < 100 and checked within the last year     Blood Pressure  BP Readings from Last 3 Encounters:   05/12/22 (!) 141/80   08/26/21 (!) 142/76   07/26/21 (!) 161/84      []  If SBP >140 mmhg - refer to PCP for follow up   []  If DBP > 90 mmhg - refer to PCP for follow up   [] BP is controlled <140/90     Order labs as PCP ordered.   (ie: Lipids, A1C, CMP)

## 2022-05-20 ENCOUNTER — TELEPHONE (OUTPATIENT)
Dept: FAMILY MEDICINE CLINIC | Age: 62
End: 2022-05-20

## 2022-05-23 ENCOUNTER — OFFICE VISIT (OUTPATIENT)
Dept: PODIATRY | Age: 62
End: 2022-05-23
Payer: MEDICARE

## 2022-05-23 VITALS
HEIGHT: 69 IN | HEART RATE: 67 BPM | WEIGHT: 192 LBS | SYSTOLIC BLOOD PRESSURE: 157 MMHG | DIASTOLIC BLOOD PRESSURE: 82 MMHG | BODY MASS INDEX: 28.44 KG/M2

## 2022-05-23 DIAGNOSIS — M20.22 HALLUX RIGIDUS OF LEFT FOOT: ICD-10-CM

## 2022-05-23 DIAGNOSIS — M20.12 HALLUX VALGUS OF LEFT FOOT: ICD-10-CM

## 2022-05-23 DIAGNOSIS — M79.672 PAIN IN LEFT FOOT: Primary | ICD-10-CM

## 2022-05-23 DIAGNOSIS — M79.671 PAIN IN RIGHT FOOT: ICD-10-CM

## 2022-05-23 PROCEDURE — 99203 OFFICE O/P NEW LOW 30 MIN: CPT | Performed by: PODIATRIST

## 2022-05-23 PROCEDURE — 3017F COLORECTAL CA SCREEN DOC REV: CPT | Performed by: PODIATRIST

## 2022-05-23 PROCEDURE — G8427 DOCREV CUR MEDS BY ELIG CLIN: HCPCS | Performed by: PODIATRIST

## 2022-05-23 PROCEDURE — 1036F TOBACCO NON-USER: CPT | Performed by: PODIATRIST

## 2022-05-23 PROCEDURE — G8417 CALC BMI ABV UP PARAM F/U: HCPCS | Performed by: PODIATRIST

## 2022-05-23 NOTE — PROGRESS NOTES
[address]  [address] [place] [address],  S Redd Muñiz  Tel: [phone]   Fax: [phone]    Subjective     CC: Left foot pain    HPI:  Reji Amos is a 63y.o. year old male who presents to clinic today complaining of left foot pain located on his first MPJ which has been present for over 3 months and is starting to limit his ambulation. He rates the pain a 10 out of 10. He has not tried any shoe gear modifications or changes or other treatments. It is a throbbing pain that is worse after significant activity. He also complains of occasional nail pain admits to cutting his toenails far too low which is causing him pain and believes it may be a cause of ingrown nails. He does not want to focus on this problem today but is open to intervention in the future if it becomes contributory. Patient has no other pedal complaints at this time. Primary care physician is Ina Maguire MD.    ROS:    Constitutional: Denies nausea, vomiting, fever, chills. Neurologic: Denies numbness, tingling, and burning in the feet. Vascular: Denies symptoms of lower extremity claudication. Skin: Denies open wounds. Otherwise negative except as noted in the HPI. PMH:  Past Medical History:   Diagnosis Date    Arthritis     general    Cerebral artery occlusion with cerebral infarction (HCC)     mild    Chronic back pain     Elbow pain, chronic     Left    Full dentures     Fungus infection     bilat.  feet    Hyperlipidemia     Hypertension     Thoracic aortic aneurysm diagnosed  2010 at [facility] Type II or unspecified type diabetes mellitus without mention of complication, not stated as uncontrolled        Surgical History:   Past Surgical History:   Procedure Laterality Date    COLONOSCOPY  8/25/14    biopsy    DENTAL SURGERY      all teeth removed    ELBOW ARTHROSCOPY Right 02/21/2018    with loose body removal    LA ELBOW ARTHROSCOP,DIAGNOSTIC Left 2/21/2018 ELBOW ARTHROSCOPY, REMOVAL OF LOOSE BODY AND DEBRIDEMENT ([address], NORMAL-SCOPE EQUIPMENT, ARM RYAN, LATERAL W/BEAN BAG, 3080 TABLE, SIMPLE SLING) performed by Vicki Montez DO at [address]  2012    AORTIC ANEURYSM REPAIR       Social History:  Social History     Tobacco Use    Smoking status: Former Smoker     Packs/day: 0.25     Years: 10.00     Pack years: 2.50     Quit date: 10/1/1998     Years since quittin.6    Smokeless tobacco: Never Used   Vaping Use    Vaping Use: Never used   Substance Use Topics    Alcohol use: Yes     Alcohol/week: 3.0 standard drinks     Types: 3 Cans of beer per week     Comment: every weekend    Drug use: Yes     Types: Marijuana Antoinette Dasen)     Comment: last use 2017       Medications:  Prior to Admission medications    Medication Sig Start Date End Date Taking?  Authorizing Provider   glipiZIDE (GLUCOTROL XL) 10 MG extended release tablet TAKE ONE (1) TABLET BY MOUTH TWICE DAILY 22   Keron Fam MD   hydroCHLOROthiazide (HYDRODIURIL) 50 MG tablet take 1 tablet by mouth once daily 22   Adrienne Mosquera MD   acetaminophen (APAP EXTRA STRENGTH) 500 MG tablet Take 2 tablets by mouth every 6 hours as needed for Pain 22   Adrienne Mosquera MD   lidocaine (LIDODERM) 5 % Place 1 patch onto the skin daily 12 hours on, 12 hours off. 22   Adrienne Mosquera MD   metoprolol (LOPRESSOR) 100 MG tablet TAKE ONE (1) TABLET BY MOUTH TWICE DAILY 22   Adrienne Mosquera MD   metFORMIN (GLUCOPHAGE) 1000 MG tablet TAKE 1 TABLET BY MOUTH TWICE DAILY WITH FOOD 3/1/22   Carmelo Jefferson MD   clopidogrel (PLAVIX) 75 MG tablet TAKE 1 TABLET BY MOUTH EVERY DAY *PATIENT NEEDS APPOINTMENT* 3/1/22   Marcelino Virk MD   amLODIPine (NORVASC) 10 MG tablet TAKE 1 TABLET BY MOUTH EVERY DAY 3/1/22   Marcelino Virk MD   ibuprofen (ADVIL;MOTRIN) 800 MG tablet TAKE 1 TABLET BY MOUTH EVERY 6 HOURS AS NEEDED FOR PAIN *PATIENT NEEDS APPOINTMENT* 3/1/22   Crystal Cuello MD   lisinopril (PRINIVIL;ZESTRIL) 40 MG tablet TAKE 1 TABLET BY MOUTH DAILY 1/24/22   Vivian Lucero MD   INVOKANA 300 MG TABS tablet TAKE 1 TABLET BY MOUTH EVERY MORNING (BEFORE BREAKFAST). TAKE 1/2 TABLET FOR THE FIRST WEEK, THEN TAKE A FULL TABLET AFTER 1 WEEK. 10/8/21   Keron Fam MD   aspirin EC 81 MG EC tablet Take 1 tablet by mouth daily 8/26/21   nEa Arias MD   simvastatin (ZOCOR) 40 MG tablet Take 40 mg by mouth daily    Historical Provider, MD   terbinafine (ATHLETES FOOT, TERBINAFINE,) 1 % cream Apply topically 2 times daily. 7/26/21   Ena Arias MD   LANCETS ULTRA THIN MISC 1 each by Does not apply route as needed (blood glc check) 10/2/17   Bettina Cochran MD   Alcohol Swabs (ALCOHOL PREP) PADS 1 each by Does not apply route as needed (glc check) 10/2/17   Bettina Cochran MD   glucose blood VI test strips (ASCENSIA AUTODISC VI;ONE TOUCH ULTRA TEST VI) strip Dx: DM type 2, use daily with insulin 10/2/17   Bettina Cochran MD   Elastic Bandages & Supports (ELBOW BRACE) MISC 1 Device by Does not apply route as needed (elbow pain) 9/18/17   Bettina Cochran MD   ketoconazole (NIZORAL) 2 % cream Apply topically to bottoms of both feet daily. Patient not taking: Reported on 5/12/2022 5/24/17   Reba Corrales DPM       Objective     Vitals:    05/23/22 1409   BP: (!) 157/82   Pulse: 67       Lab Results   Component Value Date    LABA1C 6.5 05/12/2022       No results found for: SEDRATE        Physical Exam:  General:  Alert and oriented x3. In no acute distress. Lower Extremity Physical Exam:    Vascular: DP and PT pulses are palpable. CFT <3 seconds to all digits. No pedal edema. Hair growth is absent to the level of all pedal digits. Neuro: Saph/sural/SP/DP/plantar sensation present to light touch. Protective sensation is intact to all sites as tested with a 5.07g SWMF to both feet.      Musculoskeletal: EHL/FHL/GS/TA gross motor intact to the right and left LE. Palpation of left first MPJ medially and dorsally elicits pain with motion no notable tracking or crepitus. Gross deformity is limited to slight hallux abductovalgus left foot. Dermatologic: No open lesions, Bilateral.  Interdigital maceration absent, Bilateral.  Nails 1-10 are thickened, yellow, crumbly. Left hallucal nail medially is ingrown with no surrounding erythema or acute signs of infection. Biomechanical Exam:    R Hallux Dflex: 65 Pflex: >10  R 1st Ray D/Pflex: 5mm      R Ankle DF knee extended: 13  Right foot: Ankle DF knee flexed: >10  L Hallux Dflex: 65 Pflex: 5  L 1st Ray D/Pflex: 5mm  L Ankle DF knee extended: 13  L Ankle DF knee flexed: >10  Gait Analysis: Antalgic favoring right with limited toe off/propulsion  Tx plan: X-rays, shoe modifications, ice 20 minutes 3 times per day, possible custom foot orthoses with Kearney's extension    Imaging:   XR FOOT LEFT (MIN 3 VIEWS)    (Results Pending)   XR FOOT RIGHT (MIN 3 VIEWS)    (Results Pending)       Assessment   Nisha Mar is a 63 y.o. male with     Diagnosis Orders   1. Pain in left foot  XR FOOT LEFT (MIN 3 VIEWS)   2. Hallux valgus of left foot  XR FOOT LEFT (MIN 3 VIEWS)   3. Hallux rigidus of left foot  XR FOOT LEFT (MIN 3 VIEWS)   4. Pain in right foot  XR FOOT RIGHT (MIN 3 VIEWS)        Plan   Patient examined and evaluated  Diagnosis and treatment options discussed in detail including exhausting conservative options prior to surgical intervention. Those options consist of icing 20 minutes 3 times per day and shoewear modification or alteration. Referred patient to [address] [place] [address] [employer] for possible [place] or other wider shoe.   Discussed custom foot orthoses with patient possibility of Kearney's extension limit the range of motion of the left first MPJ  Educated pt on the importance of limiting his at home debridements to avoid the potential for pedal ulceration and infection  Orders placed weightbearing x-rays right and left foot patient will obtain these prior to next visit  Patient to RTC in 2 weeks  Discussed with Dr. JesusA lberto Guajardo  Please, call the office with any questions or concerns     No orders of the defined types were placed in this encounter. Orders Placed This Encounter   Procedures    XR FOOT LEFT (MIN 3 VIEWS)     Standing Status:   Future     Standing Expiration Date:   5/23/2023     Scheduling Instructions:      Weight bearing please     Order Specific Question:   Reason for exam:     Answer:   Pain    XR FOOT RIGHT (MIN 3 VIEWS)     Weight bearing     Standing Status:   Future     Standing Expiration Date:   5/23/2023     Scheduling Instructions:      Weight bearing     Order Specific Question:   Reason for exam:     Answer:   pain       Anitra Canada DPM   Podiatric Medicine & Surgery   5/23/2022 at 2:58 PM    This note is created with the assistance of a speech recognition program.  While intending to generate a document that actually reflects the content of the visit, the document can still have some errors including those of syntax and sound a like substitutions which may escape proof reading. In such instances, actual meaning can be extrapolated by contextual diversion.

## 2022-06-02 DIAGNOSIS — I10 ESSENTIAL HYPERTENSION: ICD-10-CM

## 2022-06-03 RX ORDER — HYDRALAZINE HYDROCHLORIDE 10 MG/1
TABLET, FILM COATED ORAL
Qty: 90 TABLET | Refills: 10 | OUTPATIENT
Start: 2022-06-03

## 2022-06-03 NOTE — TELEPHONE ENCOUNTER
E-scribe request for med refills. Please review and e-scribe if applicable. Last Visit Date:  5/12/22  Next Visit Date:  6/14/2022    Hemoglobin A1C (%)   Date Value   05/12/2022 6.5   08/26/2021 9.0   05/03/2021 8.8             ( goal A1C is < 7)   Microalb/Crt. Ratio (mcg/mg creat)   Date Value   08/26/2021 693 (H)     LDL Cholesterol (mg/dL)   Date Value   08/26/2021 52     LDL Calculated (mg/dL)   Date Value   07/24/2013 120       (goal LDL is <100)   AST (U/L)   Date Value   08/26/2021 10     ALT (U/L)   Date Value   08/26/2021 10     BUN (mg/dL)   Date Value   08/26/2021 18     BP Readings from Last 3 Encounters:   05/23/22 (!) 157/82   05/12/22 (!) 141/80   08/26/21 (!) 142/76          (goal 120/80)        Patient Active Problem List:     HLD (hyperlipidemia)     Essential hypertension     DM (diabetes mellitus) ([facility] [address]     Chronic back pain     Thoracic aortic aneurysm diagnosed  2010?  at [facility]     Acute pain of right shoulder     Other male erectile dysfunction     Type 2 diabetes mellitus with hyperglycemia      ----Rolanda Hightower

## 2022-06-13 DIAGNOSIS — I10 ESSENTIAL HYPERTENSION: ICD-10-CM

## 2022-06-14 ENCOUNTER — OFFICE VISIT (OUTPATIENT)
Dept: FAMILY MEDICINE CLINIC | Age: 62
End: 2022-06-14
Payer: MEDICARE

## 2022-06-14 VITALS
DIASTOLIC BLOOD PRESSURE: 78 MMHG | TEMPERATURE: 97 F | HEART RATE: 67 BPM | BODY MASS INDEX: 28.06 KG/M2 | WEIGHT: 190 LBS | SYSTOLIC BLOOD PRESSURE: 150 MMHG

## 2022-06-14 DIAGNOSIS — I10 ESSENTIAL HYPERTENSION: ICD-10-CM

## 2022-06-14 DIAGNOSIS — E11.65 TYPE 2 DIABETES MELLITUS WITH HYPERGLYCEMIA, WITHOUT LONG-TERM CURRENT USE OF INSULIN (HCC): Primary | ICD-10-CM

## 2022-06-14 DIAGNOSIS — M54.50 CHRONIC LUMBOSACRAL PAIN: ICD-10-CM

## 2022-06-14 DIAGNOSIS — Z12.5 SCREENING PSA (PROSTATE SPECIFIC ANTIGEN): ICD-10-CM

## 2022-06-14 DIAGNOSIS — G89.29 CHRONIC LUMBOSACRAL PAIN: ICD-10-CM

## 2022-06-14 PROCEDURE — 3017F COLORECTAL CA SCREEN DOC REV: CPT | Performed by: STUDENT IN AN ORGANIZED HEALTH CARE EDUCATION/TRAINING PROGRAM

## 2022-06-14 PROCEDURE — 99213 OFFICE O/P EST LOW 20 MIN: CPT | Performed by: STUDENT IN AN ORGANIZED HEALTH CARE EDUCATION/TRAINING PROGRAM

## 2022-06-14 PROCEDURE — 1036F TOBACCO NON-USER: CPT | Performed by: STUDENT IN AN ORGANIZED HEALTH CARE EDUCATION/TRAINING PROGRAM

## 2022-06-14 PROCEDURE — G8417 CALC BMI ABV UP PARAM F/U: HCPCS | Performed by: STUDENT IN AN ORGANIZED HEALTH CARE EDUCATION/TRAINING PROGRAM

## 2022-06-14 PROCEDURE — G8428 CUR MEDS NOT DOCUMENT: HCPCS | Performed by: STUDENT IN AN ORGANIZED HEALTH CARE EDUCATION/TRAINING PROGRAM

## 2022-06-14 PROCEDURE — 2022F DILAT RTA XM EVC RTNOPTHY: CPT | Performed by: STUDENT IN AN ORGANIZED HEALTH CARE EDUCATION/TRAINING PROGRAM

## 2022-06-14 PROCEDURE — 3044F HG A1C LEVEL LT 7.0%: CPT | Performed by: STUDENT IN AN ORGANIZED HEALTH CARE EDUCATION/TRAINING PROGRAM

## 2022-06-14 RX ORDER — CYCLOBENZAPRINE HCL 10 MG
10 TABLET ORAL NIGHTLY PRN
Qty: 30 TABLET | Refills: 0 | Status: SHIPPED | OUTPATIENT
Start: 2022-06-14 | End: 2022-07-01

## 2022-06-14 RX ORDER — HYDRALAZINE HYDROCHLORIDE 10 MG/1
TABLET, FILM COATED ORAL
Qty: 90 TABLET | Refills: 10 | OUTPATIENT
Start: 2022-06-14

## 2022-06-14 RX ORDER — ACETAMINOPHEN 500 MG
1000 TABLET ORAL EVERY 6 HOURS PRN
Qty: 120 TABLET | Refills: 3 | Status: SHIPPED | OUTPATIENT
Start: 2022-06-14

## 2022-06-14 ASSESSMENT — ENCOUNTER SYMPTOMS
SHORTNESS OF BREATH: 0
ABDOMINAL PAIN: 0
BACK PAIN: 1

## 2022-06-14 NOTE — PROGRESS NOTES
I have reviewed and discussed key elements of [address] [place] [address] Maryellen with the resident including plan of care and follow up and agree with the care bina plan. Was supposed to double HCTZ after last visit. Did not. Past Medical History:   Diagnosis Date    Arthritis     general    Cerebral artery occlusion with cerebral infarction (HCC)     mild    Chronic back pain     Elbow pain, chronic     Left    Full dentures     Fungus infection     bilat. feet    Hyperlipidemia     Hypertension     Thoracic aortic aneurysm diagnosed  2010 at [facility] Type II or unspecified type diabetes mellitus without mention of complication, not stated as uncontrolled         Diagnosis Orders   1. Type 2 diabetes mellitus with hyperglycemia, without long-term current use of insulin (HCC)  AFL - Author MD Addie, Ophthalmology, [facility]   2. Chronic lumbosacral pain  acetaminophen (APAP EXTRA STRENGTH) 500 MG tablet   3. Screening PSA (prostate specific antigen)  PSA Screening   4.  Essential hypertension

## 2022-06-14 NOTE — PROGRESS NOTES
[address] [facility] Residency Program - Outpatient Note        Leila Buerger is a 63 y.o. male  presented to the office on 06/14/22:    Pt has HTN, we previously discussed increasing HCTZ to 50mg, though patient has not done so yet because he wanted to finish pills he had at home, which are 25mg. Pt can take 2 pills at once. Patient continues to have severe left-sided back pain, is been going on for several years, x-ray lumbar spine was ordered previously due to positive straight leg raise, patient has not had time to get that done we discussed importance of imaging. Patient also has a follow-up with pain management at the end of the month. Diagnosis Orders   1. Type 2 diabetes mellitus with hyperglycemia, without long-term current use of insulin (HCC)  KRISS - Eduardo Riddle MD, Ophthalmology, [place]   2. Chronic lumbosacral pain  acetaminophen (APAP EXTRA STRENGTH) 500 MG tablet   3. Screening PSA (prostate specific antigen)  PSA Screening   4. Essential hypertension         Plan:  1. Flexeril Tylenol  2. Lumbar  3. Patient will take his hydrochlorothiazide and 50 mg and recheck in 1 month. .  He does have a blood pressure cuff at home and will check himself as well. Review of Systems   Respiratory: Negative for shortness of breath. Cardiovascular: Negative for chest pain. Gastrointestinal: Negative for abdominal pain. Genitourinary: Negative for difficulty urinating. Musculoskeletal: Positive for back pain. Neurological: Negative for dizziness and headaches. Vitals:    06/14/22 1427   BP: (!) 150/78   Pulse:    Temp:              Physical Exam  Vitals reviewed. Constitutional:       Appearance: Normal appearance. Cardiovascular:      Rate and Rhythm: Normal rate and regular rhythm. Pulses: Normal pulses. Heart sounds: Normal heart sounds.    Pulmonary:      Effort: Pulmonary effort is normal.      Breath sounds: Normal breath sounds. Abdominal:      General: Bowel sounds are normal.      Palpations: Abdomen is soft. Tenderness: There is no abdominal tenderness. Neurological:      Mental Status: He is alert. Return in about 4 weeks (around 7/12/2022) for back pain. Brie Dan MD  Family Medicine PGY-3  06/14/22 at 2:28 PM

## 2022-06-14 NOTE — PATIENT INSTRUCTIONS
Thank you for letting us take care of you today. We hope all your questions were addressed. If a question was overlooked or something else comes to mind after you return home, please contact a member of your Care Team listed below. Your Care Team at [place] [address] is Team #2  Sara Dennison DO (Faculty)  Adama Leary (Faculty)  Vini Cody MD (Resident)  Kinga Godinez MD (Resident)  Edwena Pallas, MD (Resident)  Crispin Duarte MD (Resident)  Diane Ryan MD (Resident)  SHIVA Cee ,[facility] ([facility] office)  Mattie Juarez, [address] (Clinical Practice Manager)  Velvet Simmons [facility] (Clinical Pharmacist)     Office phone number: [phone]    If you need to get in right away due to illness, please be advised we have \"Same Day\" appointments available Monday-Friday. Please call us at [phone] option #3 to schedule your \"Same Day\" appointment. Schedule a Vaccine  When you qualify to receive the vaccine, call the [facility]) COVID-19 Vaccination Hotline to schedule your appointment or to get additional information about the [facility]) locations which are offering the COVID-19 vaccine. To be 94% effective, it's important that you receive two doses of one of the COVID-19 vaccines. -If you are receiving the [employer] vaccine, your second shot will be scheduled as close to 21 days after the first shot as possible. -If you are receiving the Moderna vaccine, your second shot will be scheduled as close to 28 days after the first shot as possible. [facility]) COVID-19 Vaccination Hotline: [phone]    Links to [facility]) website and [facility] website:    [employer]-[email or web address]-[email or web address]    [email or web address]

## 2022-06-14 NOTE — PROGRESS NOTES
HYPERTENSION visit     BP Readings from Last 3 Encounters:   05/23/22 (!) 157/82   05/12/22 (!) 141/80   08/26/21 (!) 142/76       LDL Calculated (mg/dL)   Date Value   07/24/2013 120     LDL Cholesterol (mg/dL)   Date Value   08/26/2021 52     HDL (mg/dL)   Date Value   08/26/2021 33 (L)     BUN (mg/dL)   Date Value   08/26/2021 18     CREATININE (mg/dL)   Date Value   08/26/2021 1.07     Glucose (mg/dL)   Date Value   08/26/2021 233 (H)              Have you changed or started any medications since your last visit including any over-the-counter medicines, vitamins, or herbal medicines? no   Have you stopped taking any of your medications? Is so, why? -  no  Are you having any side effects from any of your medications? - no  How often do you miss doses of your medication? no      Have you seen any other physician or provider since your last visit?  no   Have you had any other diagnostic tests since your last visit?  no   Have you been seen in the emergency room and/or had an admission in a hospital since we last saw you?  no   Have you had your routine dental cleaning in the past 6 months?  no     Do you have an active MyChart account? If no, what is the barrier?   No:     Patient Care Team:  Luis Enrique Crawley MD as PCP - General (Family Medicine)    Medical History Review  Past Medical, Family, and Social History reviewed and does not contribute to the patient presenting condition    Health Maintenance   Topic Date Due    Annual Wellness Visit (AWV)  Never done    COVID-19 Vaccine (1) Never done    Prostate Specific Antigen (PSA) Screening or Monitoring  Never done    Shingles vaccine (1 of 2) Never done    Diabetic retinal exam  03/30/2016    Pneumococcal 0-64 years Vaccine (2 - PCV) 06/16/2018    Diabetic foot exam  09/18/2018    Diabetic microalbuminuria test  08/26/2022    Lipids  08/26/2022    Flu vaccine (Season Ended) 09/01/2022    A1C test (Diabetic or Prediabetic)  05/12/2023    Depression Screen  05/12/2023    Colorectal Cancer Screen  08/25/2024    DTaP/Tdap/Td vaccine (2 - Td or Tdap) 06/16/2027    Hepatitis C screen  Addressed    HIV screen  Addressed    Hepatitis A vaccine  Aged Out    Hib vaccine  Aged Out    Meningococcal (ACWY) vaccine  Aged Out

## 2022-06-15 DIAGNOSIS — I10 ESSENTIAL HYPERTENSION: ICD-10-CM

## 2022-06-15 RX ORDER — HYDRALAZINE HYDROCHLORIDE 10 MG/1
TABLET, FILM COATED ORAL
Qty: 90 TABLET | Refills: 1 | OUTPATIENT
Start: 2022-06-15

## 2022-06-15 NOTE — TELEPHONE ENCOUNTER
E-scribe request for med refills. Please review and e-scribe if applicable. Last Visit Date:  6/14/2022  Next Visit Date:  7/18/2022    Hemoglobin A1C (%)   Date Value   05/12/2022 6.5   08/26/2021 9.0   05/03/2021 8.8             ( goal A1C is < 7)   Microalb/Crt. Ratio (mcg/mg creat)   Date Value   08/26/2021 693 (H)     LDL Cholesterol (mg/dL)   Date Value   08/26/2021 52     LDL Calculated (mg/dL)   Date Value   07/24/2013 120       (goal LDL is <100)   AST (U/L)   Date Value   08/26/2021 10     ALT (U/L)   Date Value   08/26/2021 10     BUN (mg/dL)   Date Value   08/26/2021 18     BP Readings from Last 3 Encounters:   06/14/22 (!) 150/78   05/23/22 (!) 157/82   05/12/22 (!) 141/80          (goal 120/80)        Patient Active Problem List:     HLD (hyperlipidemia)     Essential hypertension     DM (diabetes mellitus) ([facility] [address]     Chronic back pain     Thoracic aortic aneurysm diagnosed  2010?  at [facility]     Acute pain of right shoulder     Other male erectile dysfunction     Type 2 diabetes mellitus with hyperglycemia      ----Darrius Sarmiento

## 2022-06-27 ENCOUNTER — OFFICE VISIT (OUTPATIENT)
Dept: PODIATRY | Age: 62
End: 2022-06-27
Payer: MEDICARE

## 2022-06-27 ENCOUNTER — HOSPITAL ENCOUNTER (OUTPATIENT)
Dept: GENERAL RADIOLOGY | Age: 62
Discharge: HOME | End: 2022-06-29
Payer: MEDICARE

## 2022-06-27 ENCOUNTER — HOSPITAL ENCOUNTER (OUTPATIENT)
Age: 62
Discharge: HOME | End: 2022-06-27
Payer: MEDICARE

## 2022-06-27 ENCOUNTER — HOSPITAL ENCOUNTER (OUTPATIENT)
Age: 62
Discharge: HOME | End: 2022-06-29

## 2022-06-27 VITALS
DIASTOLIC BLOOD PRESSURE: 74 MMHG | HEART RATE: 68 BPM | WEIGHT: 190 LBS | HEIGHT: 68 IN | BODY MASS INDEX: 28.79 KG/M2 | SYSTOLIC BLOOD PRESSURE: 154 MMHG

## 2022-06-27 DIAGNOSIS — M79.671 PAIN IN RIGHT FOOT: ICD-10-CM

## 2022-06-27 DIAGNOSIS — M79.672 PAIN IN LEFT FOOT: ICD-10-CM

## 2022-06-27 DIAGNOSIS — M20.22 HALLUX RIGIDUS OF LEFT FOOT: ICD-10-CM

## 2022-06-27 DIAGNOSIS — M25.572 PAIN IN JOINT OF LEFT FOOT: ICD-10-CM

## 2022-06-27 DIAGNOSIS — M20.12 HALLUX VALGUS OF LEFT FOOT: ICD-10-CM

## 2022-06-27 DIAGNOSIS — M25.572 PAIN IN JOINT OF LEFT FOOT: Primary | ICD-10-CM

## 2022-06-27 LAB — URIC ACID: 9.2 MG/DL (ref 3.4–7)

## 2022-06-27 PROCEDURE — 1036F TOBACCO NON-USER: CPT | Performed by: STUDENT IN AN ORGANIZED HEALTH CARE EDUCATION/TRAINING PROGRAM

## 2022-06-27 PROCEDURE — 73630 X-RAY EXAM OF FOOT: CPT

## 2022-06-27 PROCEDURE — G8417 CALC BMI ABV UP PARAM F/U: HCPCS | Performed by: STUDENT IN AN ORGANIZED HEALTH CARE EDUCATION/TRAINING PROGRAM

## 2022-06-27 PROCEDURE — G8427 DOCREV CUR MEDS BY ELIG CLIN: HCPCS | Performed by: STUDENT IN AN ORGANIZED HEALTH CARE EDUCATION/TRAINING PROGRAM

## 2022-06-27 PROCEDURE — 84550 ASSAY OF BLOOD/URIC ACID: CPT

## 2022-06-27 PROCEDURE — 36415 COLL VENOUS BLD VENIPUNCTURE: CPT

## 2022-06-27 PROCEDURE — 99214 OFFICE O/P EST MOD 30 MIN: CPT | Performed by: STUDENT IN AN ORGANIZED HEALTH CARE EDUCATION/TRAINING PROGRAM

## 2022-06-27 PROCEDURE — 3017F COLORECTAL CA SCREEN DOC REV: CPT | Performed by: STUDENT IN AN ORGANIZED HEALTH CARE EDUCATION/TRAINING PROGRAM

## 2022-06-27 NOTE — PROGRESS NOTES
[facility]  [address] [facility] [address] [facility], [address]  Tel: [phone]   Fax: [phone]    Subjective     CC: Left foot pain    HPI:  John Teresa is a 63y.o. year old male who returns to clinic today for follow up of left 1st MPJ pain. The patient explains the pain and swelling has reduced since his last visit. Describes that sometimes his big toe joint gets red, painful, and swollen and it has been occurring off and on for some time. He did forget to obtain the foot xrays previously ordered. Denies known history of gout. No new concerns today. Primary care physician is Trent Blevins MD.    ROS:    Constitutional: Denies nausea, vomiting, fever, chills. Neurologic: Denies numbness, tingling, and burning in the feet. Vascular: Denies symptoms of lower extremity claudication. Skin: Denies open wounds. Otherwise negative except as noted in the HPI. PMH:  Past Medical History:   Diagnosis Date    Arthritis     general    Cerebral artery occlusion with cerebral infarction (HCC)     mild    Chronic back pain     Elbow pain, chronic     Left    Full dentures     Fungus infection     bilat.  feet    Hyperlipidemia     Hypertension     Thoracic aortic aneurysm diagnosed  2010 at [facility] Type II or unspecified type diabetes mellitus without mention of complication, not stated as uncontrolled        Surgical History:   Past Surgical History:   Procedure Laterality Date    COLONOSCOPY  8/25/14    biopsy    DENTAL SURGERY      all teeth removed    ELBOW ARTHROSCOPY Right 02/21/2018    with loose body removal    [facility] ELBOW ARTHROSCOP,DIAGNOSTIC Left 2/21/2018    ELBOW ARTHROSCOPY, REMOVAL OF LOOSE BODY AND DEBRIDEMENT ([address], NORMAL-SCOPE EQUIPMENT, ARM RYAN, LATERAL W/BEAN BAG, 3080 TABLE, SIMPLE SLING) performed by Kenny Hollins DO at [address]  march 14th 2012    AORTIC ANEURYSM REPAIR       Social History:  Social History     Tobacco Use    Smoking status: Former Smoker     Packs/day: 0.25     Years: 10.00     Pack years: 2.50     Quit date: 10/1/1998     Years since quittin.7    Smokeless tobacco: Never Used   Vaping Use    Vaping Use: Never used   Substance Use Topics    Alcohol use: Yes     Alcohol/week: 3.0 standard drinks     Types: 3 Cans of beer per week     Comment: every weekend    Drug use: Yes     Types: Marijuana Meagan Reilly)     Comment: last use 2017       Medications:  Prior to Admission medications    Medication Sig Start Date End Date Taking? Authorizing Provider   acetaminophen (APAP EXTRA STRENGTH) 500 MG tablet Take 2 tablets by mouth every 6 hours as needed for Pain 22   Delon Ruiz MD   glipiZIDE (GLUCOTROL XL) 10 MG extended release tablet TAKE ONE (1) TABLET BY MOUTH TWICE DAILY 22   Keron Fam MD   hydroCHLOROthiazide (HYDRODIURIL) 50 MG tablet take 1 tablet by mouth once daily 22   Delon Ruiz MD   lidocaine (LIDODERM) 5 % Place 1 patch onto the skin daily 12 hours on, 12 hours off. 22   Delon Ruiz MD   metoprolol (LOPRESSOR) 100 MG tablet TAKE ONE (1) TABLET BY MOUTH TWICE DAILY 22   Delon Ruiz MD   metFORMIN (GLUCOPHAGE) 1000 MG tablet TAKE 1 TABLET BY MOUTH TWICE DAILY WITH FOOD 3/1/22   Carmelo Choi MD   clopidogrel (PLAVIX) 75 MG tablet TAKE 1 TABLET BY MOUTH EVERY DAY *PATIENT NEEDS APPOINTMENT* 3/1/22   Carmelo Choi MD   amLODIPine (NORVASC) 10 MG tablet TAKE 1 TABLET BY MOUTH EVERY DAY 3/1/22   Adrian Sweeney MD   lisinopril (PRINIVIL;ZESTRIL) 40 MG tablet TAKE 1 TABLET BY MOUTH DAILY 22   Mahin Vargas MD   INVOKANA 300 MG TABS tablet TAKE 1 TABLET BY MOUTH EVERY MORNING (BEFORE BREAKFAST). TAKE 1/2 TABLET FOR THE FIRST WEEK, THEN TAKE A FULL TABLET AFTER 1 WEEK.  10/8/21   Keron Fam MD   aspirin EC 81 MG EC tablet Take 1 tablet by mouth daily 21   Delon Ruiz MD   simvastatin (ZOCOR) 40 MG tablet Take 40 mg by mouth daily    Historical Provider, MD   terbinafine (ATHLETES FOOT, TERBINAFINE,) 1 % cream Apply topically 2 times daily. 7/26/21   Alanis Olsen MD   LANCETS ULTRA THIN MISC 1 each by Does not apply route as needed (blood glc check) 10/2/17   Anam Gomez MD   Alcohol Swabs (ALCOHOL PREP) PADS 1 each by Does not apply route as needed (glc check) 10/2/17   Anam Gomez MD   glucose blood VI test strips (ASCENSIA AUTODISC VI;ONE TOUCH ULTRA TEST VI) strip Dx: DM type 2, use daily with insulin 10/2/17   Anam Gomez MD   Elastic Bandages & Supports (ELBOW BRACE) MISC 1 Device by Does not apply route as needed (elbow pain) 9/18/17   Anam Gomez MD   ketoconazole (NIZORAL) 2 % cream Apply topically to bottoms of both feet daily. 5/24/17   Mukesh Torri, DPDANIEL       Objective     Vitals:    06/27/22 1345   BP: (!) 154/74   Pulse: 68       Lab Results   Component Value Date    LABA1C 6.5 05/12/2022       No results found for: SEDRATE        Physical Exam:  General:  Alert and oriented x3. In no acute distress. Lower Extremity Physical Exam:    Vascular: DP and PT pulses are palpable. CFT <3 seconds to all digits. No pedal edema. Hair growth is absent to the level of all pedal digits. Neuro: Saph/sural/SP/DP/plantar sensation present to light touch. Protective sensation is intact to all sites as tested with a 5.07g SWMF to both feet. Musculoskeletal: EHL/FHL/GS/TA gross motor intact to the right and left LE. Pain & limited ROM of left 1st MPJ. HAV b/l    Dermatologic: No open lesions, Bilateral.  Interdigital maceration absent, Bilateral.  Nails 1-10 are thickened, yellow, not elongated.      Biomechanical Exam:    R Hallux Dflex: 65 Pflex: >10  R 1st Ray D/Pflex: 5mm      R Ankle DF knee extended: 13  Right foot: Ankle DF knee flexed: >10  L Hallux Dflex: 65 Pflex: 5  L 1st Ray D/Pflex: 5mm  L Ankle DF knee extended: 13  L Ankle DF knee flexed: >10  Gait Analysis: Antalgic favoring right with limited toe off/propulsion  Tx plan: X-rays, shoe modifications, ice 20 minutes 3 times per day, possible custom foot orthoses with [place]'s extension    Imaging:   No orders to display       Assessment   Bettina Tee is a 63 y.o. male with     Diagnosis Orders   1. Pain in joint of left foot  Uric Acid    DME Order for Orthosis as OP   2. Hallux valgus of left foot  DME Order for Orthosis as OP   3. Hallux rigidus of left foot  DME Order for Orthosis as OP        Plan   Patient examined and evaluated  Explained to patient that he may be having gout attacks versus hallux rigidus of the left 1st MPJ. Will obtain uric acid level lab draw and encouraged he obtain the foot xrays for further evaluation. Will obtain prior authorization for custom orthotics as well. Order for uric acid level  Script for custom orthotics from   Patient to RTC in 2 months for nail care  Discussed with Dr. Lazaro Guardado  Please, call the office with any questions or concerns     No orders of the defined types were placed in this encounter.     Orders Placed This Encounter   Procedures    Uric Acid     Standing Status:   Future     Number of Occurrences:   1     Standing Expiration Date:   6/27/2023    DME Order for Orthosis as OP       Eunice Smith DPM   Podiatric Medicine & Surgery   6/29/2022 at 7:17 AM

## 2022-06-30 ENCOUNTER — HOSPITAL ENCOUNTER (OUTPATIENT)
Dept: PAIN MANAGEMENT | Age: 62
Discharge: HOME | End: 2022-06-30
Payer: MEDICARE

## 2022-06-30 VITALS
SYSTOLIC BLOOD PRESSURE: 154 MMHG | OXYGEN SATURATION: 97 % | HEART RATE: 73 BPM | HEIGHT: 68 IN | DIASTOLIC BLOOD PRESSURE: 68 MMHG | WEIGHT: 190 LBS | BODY MASS INDEX: 28.79 KG/M2

## 2022-06-30 DIAGNOSIS — M25.511 ACUTE PAIN OF RIGHT SHOULDER: ICD-10-CM

## 2022-06-30 DIAGNOSIS — E11.65 POORLY CONTROLLED DIABETES MELLITUS (HCC): ICD-10-CM

## 2022-06-30 DIAGNOSIS — G89.29 CHRONIC BILATERAL LOW BACK PAIN WITHOUT SCIATICA: Primary | Chronic | ICD-10-CM

## 2022-06-30 DIAGNOSIS — G89.29 CHRONIC LUMBOSACRAL PAIN: ICD-10-CM

## 2022-06-30 DIAGNOSIS — M54.50 CHRONIC LUMBOSACRAL PAIN: ICD-10-CM

## 2022-06-30 DIAGNOSIS — Z79.02 LONG TERM (CURRENT) USE OF ANTITHROMBOTICS/ANTIPLATELETS: Chronic | ICD-10-CM

## 2022-06-30 DIAGNOSIS — M54.50 CHRONIC BILATERAL LOW BACK PAIN WITHOUT SCIATICA: Primary | Chronic | ICD-10-CM

## 2022-06-30 PROCEDURE — 99204 OFFICE O/P NEW MOD 45 MIN: CPT | Performed by: ANESTHESIOLOGY

## 2022-06-30 PROCEDURE — 99203 OFFICE O/P NEW LOW 30 MIN: CPT

## 2022-06-30 RX ORDER — LIDOCAINE 50 MG/G
PATCH TOPICAL
Qty: 30 PATCH | Refills: 10 | OUTPATIENT
Start: 2022-06-30

## 2022-06-30 RX ORDER — GLIPIZIDE 10 MG/1
TABLET, FILM COATED, EXTENDED RELEASE ORAL
Qty: 60 TABLET | Refills: 10 | OUTPATIENT
Start: 2022-06-30

## 2022-06-30 RX ORDER — IBUPROFEN 800 MG/1
TABLET ORAL
Qty: 120 TABLET | Refills: 10 | Status: SHIPPED | OUTPATIENT
Start: 2022-06-30

## 2022-06-30 RX ORDER — LIDOCAINE 50 MG/G
1 PATCH TOPICAL DAILY
Qty: 30 PATCH | Refills: 1 | Status: SHIPPED | OUTPATIENT
Start: 2022-06-30 | End: 2022-08-16

## 2022-06-30 RX ORDER — GLIPIZIDE 10 MG/1
TABLET, FILM COATED, EXTENDED RELEASE ORAL
Qty: 60 TABLET | Refills: 0 | Status: SHIPPED | OUTPATIENT
Start: 2022-06-30 | End: 2022-07-29

## 2022-06-30 RX ORDER — NORTRIPTYLINE HYDROCHLORIDE 25 MG/1
25 CAPSULE ORAL NIGHTLY
Qty: 30 CAPSULE | Refills: 1 | Status: SHIPPED | OUTPATIENT
Start: 2022-06-30 | End: 2022-07-30

## 2022-06-30 ASSESSMENT — ENCOUNTER SYMPTOMS
SHORTNESS OF BREATH: 0
VOMITING: 0
ABDOMINAL PAIN: 0
NAUSEA: 0
BACK PAIN: 1
SINUS PRESSURE: 0
EYE PAIN: 0
SINUS PAIN: 0
DIARRHEA: 0
CHEST TIGHTNESS: 0
COUGH: 0
EYE REDNESS: 0
CONSTIPATION: 0

## 2022-06-30 ASSESSMENT — PAIN SCALES - GENERAL: PAINLEVEL_OUTOF10: 9

## 2022-06-30 NOTE — TELEPHONE ENCOUNTER
Last visit: 6/14/22  Last Med refill:   Does patient have enough medication for 72 hours: No:     Next Visit Date:  Future Appointments   Date Time Provider Cuca Leigh   6/30/2022  2:00 PM Lolis Lopez MD [address]   7/18/2022  1:30 PM SCHEDULE, [facility] L[facility] Maintenance   Topic Date Due    Annual Wellness Visit (AWV)  Never done    COVID-19 Vaccine (1) Never done    Prostate Specific Antigen (PSA) Screening or Monitoring  Never done    Shingles vaccine (1 of 2) Never done    Diabetic retinal exam  03/30/2016    Pneumococcal 0-64 years Vaccine (2 - PCV) 06/16/2018    Diabetic foot exam  09/18/2018    Diabetic microalbuminuria test  08/26/2022    Lipids  08/26/2022    Flu vaccine (Season Ended) 09/01/2022    A1C test (Diabetic or Prediabetic)  05/12/2023    Depression Screen  05/12/2023    Colorectal Cancer Screen  08/25/2024    DTaP/Tdap/Td vaccine (2 - Td or Tdap) 06/16/2027    Hepatitis C screen  Addressed    HIV screen  Addressed    Hepatitis A vaccine  Aged Out    Hib vaccine  Aged Out    Meningococcal (ACWY) vaccine  Aged Out       Hemoglobin A1C (%)   Date Value   05/12/2022 6.5   08/26/2021 9.0   05/03/2021 8.8             ( goal A1C is < 7)   Microalb/Crt.  Ratio (mcg/mg creat)   Date Value   08/26/2021 693 (H)     LDL Cholesterol (mg/dL)   Date Value   08/26/2021 52   02/11/2016 89     LDL Calculated (mg/dL)   Date Value   07/24/2013 120       (goal LDL is <100)   AST (U/L)   Date Value   08/26/2021 10     ALT (U/L)   Date Value   08/26/2021 10     BUN (mg/dL)   Date Value   08/26/2021 18     BP Readings from Last 3 Encounters:   06/27/22 (!) 154/74   06/14/22 (!) 150/78   05/23/22 (!) 157/82          (goal 120/80)    All Future Testing planned in [facility]  Lab Frequency Next Occurrence   XR LUMBAR SPINE (2-3 VIEWS) Once 05/12/2022   PSA Screening Once 06/14/2022               Patient Active Problem List:     HLD (hyperlipidemia)     Essential hypertension     DM (diabetes mellitus) (HCC)     Chronic back pain     Thoracic aortic aneurysm diagnosed  2010?  at [facility][1]     Acute pain of right shoulder     Other male erectile dysfunction     Type 2 diabetes mellitus with hyperglycemia

## 2022-06-30 NOTE — PROGRESS NOTES
The patient is a 63 y. o. Non- / non  male. Chief Complaint   Patient presents with    Back Pain     consult        HPI  Requesting physician for the evaluation of Josy Pain 1960: Josefa Husain MD    Pain History  Pain score today  9  1. Location:lower back  2. Radiation:no  3. Character:aching, cramping  5. Duration:constant  6. Onset: years  7. Did an injury cause pain: yes, lifting injury 10 yrs ago  8. Aggravating factors:bending, twisting, standing, laying down  9. Alleviating factors: ibuprofen,   10. Associated symptoms (numbness / tingling / weakness):  no  -Where at:n/a  -Down into finger tips or toes (specify which finger or toes):n/a  -constant or intermitting: n/a  11. Red Flags: (weight loss / chills / loss of bladder or bowel control):none    Previous management history  1. Previous diagnostic workup: (Imaging/EMG)   CT, MRI, or Xray: Xray  What part of the body:lumbar spine  What facility did they have it at: [facility]  What year or specific date: ordered 05/12/2022  EMG:  no    2. Previous non interventional treatments tried:  chiropractor or physical therapy: PT  What part of the body:back  What facility was it done at: Unsure  How long ago was it last tried:years  Did it work: No  Did they complete it:Yes    3. Previous Medications tried  NSAID's: yes  Neurontin: no  Lyrica: no  Trycyclic antidepressant (Ellavil / Pamelor ): no  Cymbalta: no  Opioids (Ultram / Vicodin / Percocet / Morphine / Dilaudid / Oramorph/ Fentanyl etc.):none  Last Pain medication taken (name of med and date): IBU 6/29/22    4. Previous Interventional pain procedures tried:  What kind of injection:none  Who did the injection: n/a  did the injection help: n/a  Last time injection was done:N/A    5.  Previous surgeries for pain  What part of the body did they have the surgery:none  What physician did the surgery:n/a  What Facility did they have the surgery done:n/a  Date of Surgery:N/A    Social History:  Marital status:  Employment History: Cleaning services  Working  Yes  Full time Or Part time: Full time  Disability  No   Legal Issues related to pain complaint: No         Lab Results   Component Value Date    LABA1C 6.5 2022     Lab Results   Component Value Date     2016         Informant: patient        Past Medical History:   Diagnosis Date    Arthritis     general    Cerebral artery occlusion with cerebral infarction (HCC)     mild    Chronic back pain     Elbow pain, chronic     Left    Full dentures     Fungus infection     bilat. feet    Hyperlipidemia     Hypertension     Thoracic aortic aneurysm diagnosed   at [facility] Type II or unspecified type diabetes mellitus without mention of complication, not stated as uncontrolled         Past Surgical History:   Procedure Laterality Date    COLONOSCOPY  14    biopsy    DENTAL SURGERY      all teeth removed    ELBOW ARTHROSCOPY Right 2018    with loose body removal    [facility] ELBOW ARTHROSCOP,DIAGNOSTIC Left 2018    ELBOW ARTHROSCOPY, REMOVAL OF LOOSE BODY AND DEBRIDEMENT ([address], NORMAL-SCOPE EQUIPMENT, ARM RYAN, LATERAL W/BEAN BAG, 3080 TABLE, SIMPLE SLING) performed by Antonette Pelayo DO at [address]  2012    AORTIC ANEURYSM REPAIR       Social History     Socioeconomic History    Marital status:      Spouse name: Not on file    Number of children: Not on file    Years of education: Not on file    Highest education level: Not on file   Occupational History    Not on file   Tobacco Use    Smoking status: Former Smoker     Packs/day: 0.25     Years: 10.00     Pack years: 2.50     Quit date: 10/1/1998     Years since quittin.7    Smokeless tobacco: Never Used   Vaping Use    Vaping Use: Never used   Substance and Sexual Activity    Alcohol use:  Yes     Alcohol/week: 3.0 standard drinks     Types: 3 Cans of beer per week     Comment: every weekend    Drug use: Yes     Types: Marijuana Juanetta Shonto)     Comment: last use 2/2017    Sexual activity: Yes     Partners: Female   Other Topics Concern    Not on file   Social History Narrative    Not on file     Social Determinants of Health     Financial Resource Strain: Low Risk     Difficulty of Paying Living Expenses: Not hard at all   Food Insecurity: No Food Insecurity    Worried About [address] in the Last Year: Never true    [facility] in the Last Year: Never true   Transportation Needs:     Lack of Transportation (Medical): Not on file    Lack of Transportation (Non-Medical):  Not on file   Physical Activity:     Days of Exercise per Week: Not on file    Minutes of Exercise per Session: Not on file   Stress:     Feeling of Stress : Not on file   Social Connections:     Frequency of Communication with Friends and Family: Not on file    Frequency of Social Gatherings with Friends and Family: Not on file    Attends Sikhism Services: Not on file    Active Member of [address] [place] [address] or Organizations: Not on file    Attends Club or Organization Meetings: Not on file    Marital Status: Not on file   Intimate Partner Violence:     Fear of Current or Ex-Partner: Not on file    Emotionally Abused: Not on file    Physically Abused: Not on file    Sexually Abused: Not on file   Housing Stability:     Unable to Pay for Housing in the Last Year: Not on file    Number of [place] in the Last Year: Not on file    Unstable Housing in the Last Year: Not on file       Family History   Problem Relation Age of Onset    High Blood Pressure Mother     Diabetes Mother     Cancer Father     Heart Disease Father     Stroke Brother     Cirrhosis Brother        No Known Allergies    Vitals:    06/30/22 1415   BP: (!) 154/68   Pulse: 73   SpO2: 97%       Current Outpatient Medications   Medication Sig Dispense Refill    ibuprofen (ADVIL;MOTRIN) 800 MG tablet TAKE 1 TABLET BY MOUTH EVERY 6 HOURS AS NEEDED FOR PAIN *PATIENT NEEDS APPOINTMENT* 120 tablet 10    lidocaine (LIDODERM) 5 % Place 1 patch onto the skin daily 12 hours on, 12 hours off. (Patient not taking: Reported on 6/30/2022) 30 patch 1    glipiZIDE (GLUCOTROL XL) 10 MG extended release tablet TAKE ONE (1) TABLET BY MOUTH TWICE DAILY 60 tablet 0    acetaminophen (APAP EXTRA STRENGTH) 500 MG tablet Take 2 tablets by mouth every 6 hours as needed for Pain 120 tablet 3    hydroCHLOROthiazide (HYDRODIURIL) 50 MG tablet take 1 tablet by mouth once daily 90 tablet 1    metoprolol (LOPRESSOR) 100 MG tablet TAKE ONE (1) TABLET BY MOUTH TWICE DAILY 60 tablet 10    metFORMIN (GLUCOPHAGE) 1000 MG tablet TAKE 1 TABLET BY MOUTH TWICE DAILY WITH FOOD 60 tablet 5    clopidogrel (PLAVIX) 75 MG tablet TAKE 1 TABLET BY MOUTH EVERY DAY *PATIENT NEEDS APPOINTMENT* (Patient not taking: Reported on 6/30/2022) 30 tablet 5    amLODIPine (NORVASC) 10 MG tablet TAKE 1 TABLET BY MOUTH EVERY DAY 30 tablet 5    lisinopril (PRINIVIL;ZESTRIL) 40 MG tablet TAKE 1 TABLET BY MOUTH DAILY 90 tablet 1    INVOKANA 300 MG TABS tablet TAKE 1 TABLET BY MOUTH EVERY MORNING (BEFORE BREAKFAST). TAKE 1/2 TABLET FOR THE FIRST WEEK, THEN TAKE A FULL TABLET AFTER 1 WEEK. 30 tablet 1    aspirin EC 81 MG EC tablet Take 1 tablet by mouth daily 90 tablet 1    simvastatin (ZOCOR) 40 MG tablet Take 40 mg by mouth daily      terbinafine (ATHLETES FOOT, TERBINAFINE,) 1 % cream Apply topically 2 times daily.  (Patient not taking: Reported on 6/30/2022) 1 Tube 1    LANCETS ULTRA THIN MISC 1 each by Does not apply route as needed (blood glc check) 100 each 1    Alcohol Swabs (ALCOHOL PREP) PADS 1 each by Does not apply route as needed (glc check) 100 each 1    glucose blood VI test strips (ASCENSIA AUTODISC VI;ONE TOUCH ULTRA TEST VI) strip Dx: DM type 2, use daily with insulin 100 each 3    Elastic Bandages & Supports (ELBOW BRACE) MISC 1 Device by Does not apply route as needed (elbow pain) 1 each 0    ketoconazole (NIZORAL) 2 % cream Apply topically to bottoms of both feet daily. (Patient not taking: Reported on 6/30/2022) 30 g 1     No current facility-administered medications for this encounter. Review of Systems   Constitutional: Negative for chills, fatigue and fever. HENT: Negative for congestion, sinus pressure and sinus pain. Eyes: Negative for pain and redness. Respiratory: Negative for cough, chest tightness and shortness of breath. Cardiovascular: Negative for chest pain and leg swelling. Gastrointestinal: Negative for abdominal pain, constipation, diarrhea, nausea and vomiting. Genitourinary: Negative for difficulty urinating, frequency and urgency. Musculoskeletal: Positive for back pain and joint swelling. Negative for neck pain and neck stiffness. Neurological: Positive for weakness. Negative for dizziness, light-headedness, numbness and headaches. Psychiatric/Behavioral: Negative for agitation, confusion and hallucinations. The patient is not nervous/anxious. Objective:  Vital signs: (most recent): Blood pressure (!) 154/68, pulse 73, height 5' 8\" (1.727 m), weight 190 lb (86.2 kg), SpO2 97 %. No fever. Assessment & Plan  No diagnosis found. No orders of the defined types were placed in this encounter. No orders of the defined types were placed in this encounter.            Electronically signed by Subha Funes MA on 6/30/2022 at 2:17 PM

## 2022-06-30 NOTE — PROGRESS NOTES
The patient is a 63 y. o. Non- / non  male. Chief Complaint   Patient presents with    Back Pain     consult        Back Pain  Associated symptoms include weakness. Pertinent negatives include no abdominal pain, chest pain, fever, headaches or numbness. Requesting physician for the evaluation of Ian Kessler 1960: Willian Barros MD    Pain History  Pain  Located in the lower lumbar area  Onset many years ago  Relates onset to a lifting injury 10 years ago  Pain is constant  Symptoms are progressively worsening  Aggravated with bending twisting turning lifting  Alleviating factors are rest and ibuprofen  No dermatomal radiation in leg  No associated numbness or paresthesia  No changes in bladder or bowel control  No previous diagnostic work-up  No previous physical therapy  No previous lumbar spine injection or surgical history  Pain score today  9  1. Location:lower back  2. Radiation:no  3. Character:aching, cramping  5. Duration:constant  6. Onset: years  7. Did an injury cause pain: yes, lifting injury 10 yrs ago  8. Aggravating factors:bending, twisting, standing, laying down  9. Alleviating factors: ibuprofen,   10. Associated symptoms (numbness / tingling / weakness):  no  -Where at:n/a  -Down into finger tips or toes (specify which finger or toes):n/a  -constant or intermitting: n/a  11. Red Flags: (weight loss / chills / loss of bladder or bowel control):none    Previous management history  1. Previous diagnostic workup: (Imaging/EMG)   CT, MRI, or Xray: Xray  What part of the body:lumbar spine  What facility did they have it at: [facility]  What year or specific date: ordered 05/12/2022  EMG:  no    2. Previous non interventional treatments tried:  chiropractor or physical therapy: PT  What part of the body:back  What facility was it done at: Unsure  How long ago was it last tried:years  Did it work: No  Did they complete it:Yes    3.  Previous Medications tried  NSAID's: yes  Neurontin: no  Lyrica: no  Trycyclic antidepressant (Ellavil / Pamelor ): no  Cymbalta: no  Opioids (Ultram / Vicodin / Percocet / Morphine / Dilaudid / Oramorph/ Fentanyl etc.):none  Last Pain medication taken (name of med and date): IBU 6/29/22    4. Previous Interventional pain procedures tried:  What kind of injection:none  Who did the injection: n/a  did the injection help: n/a  Last time injection was done:N/A    5. Previous surgeries for pain  What part of the body did they have the surgery:none  What physician did the surgery:n/a  What Facility did they have the surgery done:n/a  Date of Surgery:N/A    Social History:  Marital status:  Employment History: Cleaning services  Working  Yes  Full time Or Part time: Full time  Disability  No   Legal Issues related to pain complaint: No         Lab Results   Component Value Date    LABA1C 6.5 05/12/2022     Lab Results   Component Value Date     02/11/2016         Informant: patient        Past Medical History:   Diagnosis Date    Arthritis     general    Cerebral artery occlusion with cerebral infarction (HCC)     mild    Chronic back pain     Elbow pain, chronic     Left    Full dentures     Fungus infection     bilat.  feet    Hyperlipidemia     Hypertension     Thoracic aortic aneurysm diagnosed  2010 at [facility] Type II or unspecified type diabetes mellitus without mention of complication, not stated as uncontrolled         Past Surgical History:   Procedure Laterality Date    COLONOSCOPY  8/25/14    biopsy    DENTAL SURGERY      all teeth removed    ELBOW ARTHROSCOPY Right 02/21/2018    with loose body removal    NH ELBOW ARTHROSCOP,DIAGNOSTIC Left 2/21/2018    ELBOW ARTHROSCOPY, REMOVAL OF LOOSE BODY AND DEBRIDEMENT ([address], NORMAL-SCOPE EQUIPMENT, ARM RYAN, LATERAL W/BEAN BAG, [address] TABLE, SIMPLE SLING) performed by Ruddy Redmond DO at [address]  march 14th 2012    AORTIC ANEURYSM REPAIR       Social History     Socioeconomic History    Marital status:      Spouse name: Not on file    Number of children: Not on file    Years of education: Not on file    Highest education level: Not on file   Occupational History    Not on file   Tobacco Use    Smoking status: Former Smoker     Packs/day: 0.25     Years: 10.00     Pack years: 2.50     Quit date: 10/1/1998     Years since quittin.7    Smokeless tobacco: Never Used   Vaping Use    Vaping Use: Never used   Substance and Sexual Activity    Alcohol use: Yes     Alcohol/week: 3.0 standard drinks     Types: 3 Cans of beer per week     Comment: every weekend    Drug use: Yes     Types: Marijuana Brittany Bell)     Comment: last use 2017    Sexual activity: Yes     Partners: Female   Other Topics Concern    Not on file   Social History Narrative    Not on file     Social Determinants of Health     Financial Resource Strain: Low Risk     Difficulty of Paying Living Expenses: Not hard at all   Food Insecurity: No Food Insecurity    Worried About [address] in the Last Year: Never true    [facility] in the Last Year: Never true   Transportation Needs:     Lack of Transportation (Medical): Not on file    Lack of Transportation (Non-Medical):  Not on file   Physical Activity:     Days of Exercise per Week: Not on file    Minutes of Exercise per Session: Not on file   Stress:     Feeling of Stress : Not on file   Social Connections:     Frequency of Communication with Friends and Family: Not on file    Frequency of Social Gatherings with Friends and Family: Not on file    Attends Quaker Services: Not on file    Active Member of Clubs or Organizations: Not on file    Attends Club or Organization Meetings: Not on file    Marital Status: Not on file   Intimate Partner Violence:     Fear of Current or Ex-Partner: Not on file    Emotionally Abused: Not on file    Physically Abused: Not on file   Noman Sexually Abused: Not on file   Housing Stability:     Unable to Pay for Housing in the Last Year: Not on file    Number of Places Lived in the Last Year: Not on file    Unstable Housing in the Last Year: Not on file       Family History   Problem Relation Age of Onset    High Blood Pressure Mother     Diabetes Mother     Cancer Father     Heart Disease Father     Stroke Brother     Cirrhosis Brother        No Known Allergies    Vitals:    06/30/22 1415   BP: (!) 154/68   Pulse: 73   SpO2: 97%       Current Outpatient Medications   Medication Sig Dispense Refill    nortriptyline (PAMELOR) 25 MG capsule Take 1 capsule by mouth nightly 30 capsule 1    ibuprofen (ADVIL;MOTRIN) 800 MG tablet TAKE 1 TABLET BY MOUTH EVERY 6 HOURS AS NEEDED FOR PAIN *PATIENT NEEDS APPOINTMENT* 120 tablet 10    lidocaine (LIDODERM) 5 % Place 1 patch onto the skin daily 12 hours on, 12 hours off. (Patient not taking: Reported on 6/30/2022) 30 patch 1    glipiZIDE (GLUCOTROL XL) 10 MG extended release tablet TAKE ONE (1) TABLET BY MOUTH TWICE DAILY 60 tablet 0    acetaminophen (APAP EXTRA STRENGTH) 500 MG tablet Take 2 tablets by mouth every 6 hours as needed for Pain 120 tablet 3    hydroCHLOROthiazide (HYDRODIURIL) 50 MG tablet take 1 tablet by mouth once daily 90 tablet 1    metoprolol (LOPRESSOR) 100 MG tablet TAKE ONE (1) TABLET BY MOUTH TWICE DAILY 60 tablet 10    metFORMIN (GLUCOPHAGE) 1000 MG tablet TAKE 1 TABLET BY MOUTH TWICE DAILY WITH FOOD 60 tablet 5    clopidogrel (PLAVIX) 75 MG tablet TAKE 1 TABLET BY MOUTH EVERY DAY *PATIENT NEEDS APPOINTMENT* (Patient not taking: Reported on 6/30/2022) 30 tablet 5    amLODIPine (NORVASC) 10 MG tablet TAKE 1 TABLET BY MOUTH EVERY DAY 30 tablet 5    lisinopril (PRINIVIL;ZESTRIL) 40 MG tablet TAKE 1 TABLET BY MOUTH DAILY 90 tablet 1    INVOKANA 300 MG TABS tablet TAKE 1 TABLET BY MOUTH EVERY MORNING (BEFORE BREAKFAST).  TAKE 1/2 TABLET FOR THE FIRST WEEK, THEN TAKE A FULL TABLET AFTER 1 WEEK. 30 tablet 1    aspirin EC 81 MG EC tablet Take 1 tablet by mouth daily 90 tablet 1    simvastatin (ZOCOR) 40 MG tablet Take 40 mg by mouth daily      terbinafine (ATHLETES FOOT, TERBINAFINE,) 1 % cream Apply topically 2 times daily. (Patient not taking: Reported on 6/30/2022) 1 Tube 1    LANCETS ULTRA THIN MISC 1 each by Does not apply route as needed (blood glc check) 100 each 1    Alcohol Swabs (ALCOHOL PREP) PADS 1 each by Does not apply route as needed (glc check) 100 each 1    glucose blood VI test strips (ASCENSIA AUTODISC VI;ONE TOUCH ULTRA TEST VI) strip Dx: DM type 2, use daily with insulin 100 each 3    Elastic Bandages & Supports (ELBOW BRACE) MISC 1 Device by Does not apply route as needed (elbow pain) 1 each 0    ketoconazole (NIZORAL) 2 % cream Apply topically to bottoms of both feet daily. (Patient not taking: Reported on 6/30/2022) 30 g 1     No current facility-administered medications for this encounter. Review of Systems   Constitutional: Negative for chills, fatigue and fever. HENT: Negative for congestion, sinus pressure and sinus pain. Eyes: Negative for pain and redness. Respiratory: Negative for cough, chest tightness and shortness of breath. Cardiovascular: Negative for chest pain and leg swelling. Gastrointestinal: Negative for abdominal pain, constipation, diarrhea, nausea and vomiting. Genitourinary: Negative for difficulty urinating, frequency and urgency. Musculoskeletal: Positive for back pain and joint swelling. Negative for neck pain and neck stiffness. Neurological: Positive for weakness. Negative for dizziness, light-headedness, numbness and headaches. Psychiatric/Behavioral: Negative for agitation, confusion and hallucinations. The patient is not nervous/anxious. Objective:  General Appearance:  Uncomfortable, in pain, well-appearing and in no acute distress.     Vital signs: (most recent): Blood pressure (!) 154/68, pulse 73, height 5' 8\" (1.727 m), weight 190 lb (86.2 kg), SpO2 97 %. Vital signs are normal.  No fever. Output: Producing urine and producing stool. HEENT: Normal HEENT exam.    Lungs:  Normal effort and normal respiratory rate. He is not in respiratory distress. Heart: Normal rate. Extremities: Normal range of motion. There is no deformity. Neurological: Patient is alert and oriented to person, place and time. Patient has normal coordination. Pupils:  Pupils are equal, round, and reactive to light. Pupils are equal.   Skin:  Warm and dry. No rash or cyanosis. Lumbar spine examination  No apparent deformity and inspection  Range of motion is moderately limited particularly spine extension  Positive tenderness to palpation in midline and paramidline area  Lumbar facet loading maneuver positive  Gait is stable  Straight leg raise negative    Assessment & Plan   Pain History  Pain  Located in the lower lumbar area  Onset many years ago  Relates onset to a lifting injury 10 years ago  Pain is constant  Symptoms are progressively worsening  Aggravated with bending twisting turning lifting  Alleviating factors are rest and ibuprofen  No dermatomal radiation in leg  No associated numbness or paresthesia  No changes in bladder or bowel control  No previous diagnostic work-up  No previous physical therapy  No previous lumbar spine injection or surgical history  Pain score today  9    1. Chronic bilateral low back pain without sciatica    2. Long term (current) use of antithrombotics/antiplatelets    3.  Poorly controlled diabetes mellitus ([facility] [address]        Orders Placed This Encounter   Procedures    Ambulatory referral to Physical Therapy      Orders Placed This Encounter   Medications    nortriptyline (PAMELOR) 25 MG capsule     Sig: Take 1 capsule by mouth nightly     Dispense:  30 capsule     Refill:  1      Advised patient to discontinue Motrin for risk of interaction with Plavix    Will try nortriptyline  Consider trial of gabapentin in future    Consider obtaining MRI lumbar spine after therapy if symptoms persist    Consultation note sent to the referring physician        Electronically signed by Ruchi Godinez MD on 6/30/2022 at 2:55 PM

## 2022-07-01 RX ORDER — CYCLOBENZAPRINE HCL 10 MG
10 TABLET ORAL NIGHTLY PRN
Qty: 30 TABLET | Refills: 10 | Status: SHIPPED | OUTPATIENT
Start: 2022-07-01 | End: 2022-07-11

## 2022-07-01 RX ORDER — HYDRALAZINE HYDROCHLORIDE 10 MG/1
TABLET, FILM COATED ORAL
Qty: 90 TABLET | Refills: 5 | Status: SHIPPED | OUTPATIENT
Start: 2022-07-01

## 2022-07-01 NOTE — TELEPHONE ENCOUNTER
Please address the medication refill and close the encounter. If I can be of assistance, please route to the applicable pool. Thank you. Last visit: 6-14-22  Last Med refill: 6-15-22  Does patient have enough medication for 72 hours: No:     Next Visit Date:  Future Appointments   Date Time Provider Cuca Leigh   7/18/2022  1:30 PM SCHEDULE, [facility]   8/15/2022  1:40 PM DELANEY Ledbetter - CNP [facility] [address] Maintenance   Topic Date Due    Annual Wellness Visit (AWV)  Never done    COVID-19 Vaccine (1) Never done    Prostate Specific Antigen (PSA) Screening or Monitoring  Never done    Shingles vaccine (1 of 2) Never done    Diabetic retinal exam  03/30/2016    Pneumococcal 0-64 years Vaccine (2 - PCV) 06/16/2018    Diabetic foot exam  09/18/2018    Diabetic microalbuminuria test  08/26/2022    Lipids  08/26/2022    Flu vaccine (1) 09/01/2022    A1C test (Diabetic or Prediabetic)  05/12/2023    Depression Screen  05/12/2023    Colorectal Cancer Screen  08/25/2024    DTaP/Tdap/Td vaccine (2 - Td or Tdap) 06/16/2027    Hepatitis C screen  Addressed    HIV screen  Addressed    Hepatitis A vaccine  Aged Out    Hib vaccine  Aged Out    Meningococcal (ACWY) vaccine  Aged Out       Hemoglobin A1C (%)   Date Value   05/12/2022 6.5   08/26/2021 9.0   05/03/2021 8.8             ( goal A1C is < 7)   Microalb/Crt.  Ratio (mcg/mg creat)   Date Value   08/26/2021 693 (H)     LDL Cholesterol (mg/dL)   Date Value   08/26/2021 52   02/11/2016 89     LDL Calculated (mg/dL)   Date Value   07/24/2013 120       (goal LDL is <100)   AST (U/L)   Date Value   08/26/2021 10     ALT (U/L)   Date Value   08/26/2021 10     BUN (mg/dL)   Date Value   08/26/2021 18     BP Readings from Last 3 Encounters:   06/30/22 (!) 154/68   06/27/22 (!) 154/74   06/14/22 (!) 150/78          (goal 120/80)    All Future Testing planned in [facility]  Lab Frequency Next Occurrence   XR LUMBAR SPINE (2-3 VIEWS) Once 05/12/2022   PSA Screening Once 06/14/2022               Patient Active Problem List:     HLD (hyperlipidemia)     Essential hypertension     DM (diabetes mellitus) ([facility] [address]     Chronic back pain     Thoracic aortic aneurysm diagnosed  2010?  at [facility]     Acute pain of right shoulder     Other male erectile dysfunction     Poorly controlled diabetes mellitus ([facility] [address]     Long term (current) use of antithrombotics/antiplatelets     Chronic bilateral low back pain without sciatica

## 2022-07-01 NOTE — TELEPHONE ENCOUNTER
Please address the medication refill and close the encounter. If I can be of assistance, please route to the applicable pool. Thank you. Last visit: 6-14-22  Last Med refill: 6-17-22  Does patient have enough medication for 72 hours: Yes    Next Visit Date:  Future Appointments   Date Time Provider Cuca Leigh   7/18/2022  1:30 PM SCHEDULE, [facility]   8/15/2022  1:40 PM DELANEY Castro - CNP [address] Maintenance   Topic Date Due    Annual Wellness Visit (AWV)  Never done    COVID-19 Vaccine (1) Never done    Prostate Specific Antigen (PSA) Screening or Monitoring  Never done    Shingles vaccine (1 of 2) Never done    Diabetic retinal exam  03/30/2016    Pneumococcal 0-64 years Vaccine (2 - PCV) 06/16/2018    Diabetic foot exam  09/18/2018    Diabetic microalbuminuria test  08/26/2022    Lipids  08/26/2022    Flu vaccine (1) 09/01/2022    A1C test (Diabetic or Prediabetic)  05/12/2023    Depression Screen  05/12/2023    Colorectal Cancer Screen  08/25/2024    DTaP/Tdap/Td vaccine (2 - Td or Tdap) 06/16/2027    Hepatitis C screen  Addressed    HIV screen  Addressed    Hepatitis A vaccine  Aged Out    Hib vaccine  Aged Out    Meningococcal (ACWY) vaccine  Aged Out       Hemoglobin A1C (%)   Date Value   05/12/2022 6.5   08/26/2021 9.0   05/03/2021 8.8             ( goal A1C is < 7)   Microalb/Crt.  Ratio (mcg/mg creat)   Date Value   08/26/2021 693 (H)     LDL Cholesterol (mg/dL)   Date Value   08/26/2021 52   02/11/2016 89     LDL Calculated (mg/dL)   Date Value   07/24/2013 120       (goal LDL is <100)   AST (U/L)   Date Value   08/26/2021 10     ALT (U/L)   Date Value   08/26/2021 10     BUN (mg/dL)   Date Value   08/26/2021 18     BP Readings from Last 3 Encounters:   06/30/22 (!) 154/68   06/27/22 (!) 154/74   06/14/22 (!) 150/78          (goal 120/80)    All Future Testing planned in [facility]  Lab Frequency Next Occurrence   XR LUMBAR SPINE (2-3 VIEWS) Once 05/12/2022   PSA Screening Once 06/14/2022               Patient Active Problem List:     HLD (hyperlipidemia)     Essential hypertension     DM (diabetes mellitus) ([facility] [address]     Chronic back pain     Thoracic aortic aneurysm diagnosed  2010?  at [facility]     Acute pain of right shoulder     Other male erectile dysfunction     Poorly controlled diabetes mellitus ([facility] [address]     Long term (current) use of antithrombotics/antiplatelets     Chronic bilateral low back pain without sciatica

## 2022-07-18 ENCOUNTER — OFFICE VISIT (OUTPATIENT)
Dept: FAMILY MEDICINE CLINIC | Age: 62
End: 2022-07-18
Payer: MEDICARE

## 2022-07-18 VITALS
WEIGHT: 190.8 LBS | BODY MASS INDEX: 28.92 KG/M2 | HEART RATE: 72 BPM | HEIGHT: 68 IN | DIASTOLIC BLOOD PRESSURE: 80 MMHG | SYSTOLIC BLOOD PRESSURE: 135 MMHG

## 2022-07-18 DIAGNOSIS — Z00.00 INITIAL MEDICARE ANNUAL WELLNESS VISIT: ICD-10-CM

## 2022-07-18 DIAGNOSIS — Z00.00 MEDICARE ANNUAL WELLNESS VISIT, SUBSEQUENT: Primary | ICD-10-CM

## 2022-07-18 PROCEDURE — 3017F COLORECTAL CA SCREEN DOC REV: CPT | Performed by: FAMILY MEDICINE

## 2022-07-18 PROCEDURE — G0439 PPPS, SUBSEQ VISIT: HCPCS | Performed by: FAMILY MEDICINE

## 2022-07-18 ASSESSMENT — PATIENT HEALTH QUESTIONNAIRE - PHQ9
SUM OF ALL RESPONSES TO PHQ QUESTIONS 1-9: 0
SUM OF ALL RESPONSES TO PHQ QUESTIONS 1-9: 0
SUM OF ALL RESPONSES TO PHQ9 QUESTIONS 1 & 2: 0
2. FEELING DOWN, DEPRESSED OR HOPELESS: 0
1. LITTLE INTEREST OR PLEASURE IN DOING THINGS: 0
SUM OF ALL RESPONSES TO PHQ QUESTIONS 1-9: 0
SUM OF ALL RESPONSES TO PHQ QUESTIONS 1-9: 0

## 2022-07-18 ASSESSMENT — LIFESTYLE VARIABLES
HOW MANY STANDARD DRINKS CONTAINING ALCOHOL DO YOU HAVE ON A TYPICAL DAY: 1 OR 2
HOW OFTEN DO YOU HAVE A DRINK CONTAINING ALCOHOL: 2-4 TIMES A MONTH

## 2022-07-18 NOTE — PROGRESS NOTES
Medicare Annual Wellness Visit    Luis Miguel Jordan is here for Medicare AWV (annual)    Assessment & Plan   Medicare annual wellness visit, subsequent  Initial Medicare annual wellness visit    Recommendations for Preventive Services Due: see orders and patient instructions/AVS.  Recommended screening schedule for the next 5-10 years is provided to the patient in written form: see Patient Instructions/AVS.     No follow-ups on file. Subjective   The following acute and/or chronic problems were also addressed today:  hypertension    Patient's complete Health Risk Assessment and screening values have been reviewed and are found in Flowsheets. The following problems were reviewed today and where indicated follow up appointments were made and/or referrals ordered.     Positive Risk Factor Screenings with Interventions:         Drug Use Screening:    DAST-10 Score Interpretation:  1-2: Low level - Monitor, re-assess at a later date; 3-5: Moderate level - Further Investigation; 6-8: Substantial level - Intensive Assessment; 9-10: Severe level - Intensive Assessment  Substance Use - Drug Use Interventions:  patient is not ready to change his/her recreational drug use behavior at this time       General Health and ACP:  General  In general, how would you say your health is?: Good  In the past 7 days, have you experienced any of the following: New or Increased Pain, New or Increased Fatigue, Loneliness, Social Isolation, Stress or Anger?: No  Do you get the social and emotional support that you need?: Yes  Do you have a Living Will?: (!) No    Advance Directives       Power of  Living Will ACP-Advance Directive ACP-Power of     Not on File Not on File Not on File Not on File          General Health Risk Interventions:  No Living Will: Patient referred to [place] Habits/Nutrition:  Physical Activity: Insufficiently Active    Days of Exercise per Week: 7 days    Minutes of Exercise per tablet by mouth daily Yes Gladys Hendrickson MD   simvastatin (ZOCOR) 40 MG tablet Take 40 mg by mouth daily Yes Historical Provider, MD   [address] [place] [address] 1 each by Does not apply route as needed (blood glc check) Yes Kingsley Xiao MD   Alcohol Swabs (ALCOHOL PREP) PADS 1 each by Does not apply route as needed (glc check) Yes Kingsley Xiao MD   glucose blood VI test strips (ASCENSIA AUTODISC VI;ONE TOUCH ULTRA TEST VI) strip Dx: DM type 2, use daily with insulin Yes Kingsley Xiao MD   Elastic Bandages & Supports (ELBOW BRACE) MISC 1 Device by Does not apply route as needed (elbow pain) Yes Kingsley Xiao MD   ibuprofen (ADVIL;MOTRIN) 800 MG tablet TAKE 1 TABLET BY MOUTH EVERY 6 HOURS AS NEEDED FOR PAIN *PATIENT NEEDS APPOINTMENT*  Gabbie Hardy MD   lidocaine (LIDODERM) 5 % Place 1 patch onto the skin daily 12 hours on, 12 hours off. Patient not taking: Reported on 6/30/2022  Gabbie Hardy MD   nortriptyline (PAMELOR) 25 MG capsule Take 1 capsule by mouth nightly  Patient not taking: Reported on 7/18/2022  Rashid Santos MD   acetaminophen (APAP EXTRA STRENGTH) 500 MG tablet Take 2 tablets by mouth every 6 hours as needed for Pain  Patient not taking: Reported on 7/18/2022  Gladys Hendrickson MD   clopidogrel (PLAVIX) 75 MG tablet TAKE 1 TABLET BY MOUTH EVERY DAY *PATIENT NEEDS APPOINTMENT*  Patient not taking: No sig reported  Luis Portillo MD   INVOKANA 300 MG TABS tablet TAKE 1 TABLET BY MOUTH EVERY MORNING (BEFORE BREAKFAST). TAKE 1/2 TABLET FOR THE FIRST WEEK, THEN TAKE A FULL TABLET AFTER 1 WEEK. Patient not taking: Reported on 7/18/2022  Keron Fam MD   terbinafine (ATHLETES FOOT, TERBINAFINE,) 1 % cream Apply topically 2 times daily. Patient not taking: Reported on 6/30/2022  Gladys Hendrickson MD   ketoconazole (NIZORAL) 2 % cream Apply topically to bottoms of both feet daily.   Patient not taking: Reported on 6/30/2022  Bettie Sandhu DPM       CareTeam (Including outside providers/suppliers regularly involved in providing care):   Patient Care Team:  Edith Garcia MD as PCP - General (Family Medicine)     Reviewed and updated this visit:  Tobacco  Allergies  Meds  Med Hx  Surg Hx  Soc Hx  Fam Hx            I, Colonel Redd LPN, 3/93/6673, performed the documented evaluation under the direct supervision of the attending physician.

## 2022-07-18 NOTE — PATIENT INSTRUCTIONS
Personalized Preventive Plan for Dayna Perez - 7/18/2022  Medicare offers a range of preventive health benefits. Some of the tests and screenings are paid in full while other may be subject to a deductible, co-insurance, and/or copay. Some of these benefits include a comprehensive review of your medical history including lifestyle, illnesses that may run in your family, and various assessments and screenings as appropriate. After reviewing your medical record and screening and assessments performed today your provider may have ordered immunizations, labs, imaging, and/or referrals for you. A list of these orders (if applicable) as well as your Preventive Care list are included within your After Visit Summary for your review. Other Preventive Recommendations:    A preventive eye exam performed by an eye specialist is recommended every 1-2 years to screen for glaucoma; cataracts, macular degeneration, and other eye disorders. A preventive dental visit is recommended every 6 months. Try to get at least 150 minutes of exercise per week or 10,000 steps per day on a pedometer . Order or download the FREE \"Exercise & Physical Activity: Your Everyday Guide\" from [employer]. Call 7-167.100.5893 or search [employer] online. You need 3529-8444 mg of calcium and 7135-0670 IU of vitamin D per day. It is possible to meet your calcium requirement with diet alone, but a vitamin D supplement is usually necessary to meet this goal.  When exposed to the sun, use a sunscreen that protects against both UVA and UVB radiation with an SPF of 30 or greater. Reapply every 2 to 3 hours or after sweating, drying off with a towel, or swimming. Always wear a seat belt when traveling in a car. Always wear a helmet when riding a bicycle or motorcycle.

## 2022-07-22 ENCOUNTER — TELEPHONE (OUTPATIENT)
Dept: PODIATRY | Age: 62
End: 2022-07-22

## 2022-07-22 NOTE — TELEPHONE ENCOUNTER
Pt called in stating he was seen on 06/27 and was told someone would look into seeing if inserts for his shoes would be covered by his insurance.  Pt states he has not heard back if inserts would be covered or not
I personally performed the service described in the documentation recorded by the scribe in my presence, and it accurately and completely records my words and actions.

## 2022-07-29 DIAGNOSIS — G89.29 CHRONIC BILATERAL LOW BACK PAIN WITHOUT SCIATICA: Chronic | ICD-10-CM

## 2022-07-29 DIAGNOSIS — M54.50 CHRONIC BILATERAL LOW BACK PAIN WITHOUT SCIATICA: Chronic | ICD-10-CM

## 2022-07-29 RX ORDER — LISINOPRIL 40 MG/1
TABLET ORAL
Qty: 30 TABLET | Refills: 10 | Status: SHIPPED | OUTPATIENT
Start: 2022-07-29

## 2022-07-29 RX ORDER — GLIPIZIDE 10 MG/1
TABLET, FILM COATED, EXTENDED RELEASE ORAL
Qty: 60 TABLET | Refills: 10 | Status: SHIPPED | OUTPATIENT
Start: 2022-07-29

## 2022-07-29 NOTE — TELEPHONE ENCOUNTER
Please address the medication refill and close the encounter. If I can be of assistance, please route to the applicable pool. Thank you. Last visit: 6-14-22  Last Med refill: 7-14-22  Does patient have enough medication for 72 hours: No:     Next Visit Date:  Future Appointments   Date Time Provider Cuca Leigh   8/1/2022  1:15 PM Aleida Razo DPM [facility]   8/15/2022  1:40 PM DELANEY Garcia - CNP [facility] [address] Maintenance   Topic Date Due    COVID-19 Vaccine (1) Never done    Prostate Specific Antigen (PSA) Screening or Monitoring  Never done    Shingles vaccine (1 of 2) Never done    Diabetic retinal exam  03/30/2016    Pneumococcal 0-64 years Vaccine (2 - PCV) 06/16/2018    Diabetic foot exam  09/18/2018    Diabetic microalbuminuria test  08/26/2022    Lipids  08/26/2022    Flu vaccine (1) 09/01/2022    A1C test (Diabetic or Prediabetic)  05/12/2023    Depression Screen  07/18/2023    Annual Wellness Visit (AWV)  07/19/2023    Colorectal Cancer Screen  08/25/2024    DTaP/Tdap/Td vaccine (2 - Td or Tdap) 06/16/2027    Hepatitis C screen  Addressed    HIV screen  Addressed    Hepatitis A vaccine  Aged Out    Hib vaccine  Aged Out    Meningococcal (ACWY) vaccine  Aged Out       Hemoglobin A1C (%)   Date Value   05/12/2022 6.5   08/26/2021 9.0   05/03/2021 8.8             ( goal A1C is < 7)   Microalb/Crt.  Ratio (mcg/mg creat)   Date Value   08/26/2021 693 (H)     LDL Cholesterol (mg/dL)   Date Value   08/26/2021 52   02/11/2016 89     LDL Calculated (mg/dL)   Date Value   07/24/2013 120       (goal LDL is <100)   AST (U/L)   Date Value   08/26/2021 10     ALT (U/L)   Date Value   08/26/2021 10     BUN (mg/dL)   Date Value   08/26/2021 18     BP Readings from Last 3 Encounters:   07/18/22 135/80   06/30/22 (!) 154/68   06/27/22 (!) 154/74          (goal 120/80)    All Future Testing planned in [facility]  Lab Frequency Next Occurrence   XR LUMBAR SPINE (2-3 VIEWS) Once 05/12/2022   PSA Screening Once 06/14/2022               Patient Active Problem List:     HLD (hyperlipidemia)     Essential hypertension     DM (diabetes mellitus) ([facility] [address]     Chronic back pain     Thoracic aortic aneurysm diagnosed  2010?  at [facility]     Acute pain of right shoulder     Other male erectile dysfunction     Poorly controlled diabetes mellitus ([facility] [address]     Long term (current) use of antithrombotics/antiplatelets     Chronic bilateral low back pain without sciatica

## 2022-07-29 NOTE — TELEPHONE ENCOUNTER
E-scribe request for med refills. Please review and e-scribe if applicable. Last Visit Date:  7/18/22  Next Visit Date:  Visit date not found    Hemoglobin A1C (%)   Date Value   05/12/2022 6.5   08/26/2021 9.0   05/03/2021 8.8             ( goal A1C is < 7)   Microalb/Crt. Ratio (mcg/mg creat)   Date Value   08/26/2021 693 (H)     LDL Cholesterol (mg/dL)   Date Value   08/26/2021 52     LDL Calculated (mg/dL)   Date Value   07/24/2013 120       (goal LDL is <100)   AST (U/L)   Date Value   08/26/2021 10     ALT (U/L)   Date Value   08/26/2021 10     BUN (mg/dL)   Date Value   08/26/2021 18     BP Readings from Last 3 Encounters:   07/18/22 135/80   06/30/22 (!) 154/68   06/27/22 (!) 154/74          (goal 120/80)        Patient Active Problem List:     HLD (hyperlipidemia)     Essential hypertension     DM (diabetes mellitus) ([address]     Chronic back pain     Thoracic aortic aneurysm diagnosed  2010?  at [facility]     Acute pain of right shoulder     Other male erectile dysfunction     Poorly controlled diabetes mellitus ([address]     Long term (current) use of antithrombotics/antiplatelets     Chronic bilateral low back pain without sciatica      ----Beti Patel

## 2022-07-29 NOTE — PROGRESS NOTES
Patient instructed to remove shoes and socks and instructed to sit in exam chair. Current PCP is Zechariah Sierra MD and date of last visit was 07/18/2022. Do you have a follow up visit scheduled?   No  If yes, the date is

## 2022-08-01 ENCOUNTER — OFFICE VISIT (OUTPATIENT)
Dept: PODIATRY | Age: 62
End: 2022-08-01
Payer: COMMERCIAL

## 2022-08-01 VITALS
WEIGHT: 190 LBS | SYSTOLIC BLOOD PRESSURE: 180 MMHG | DIASTOLIC BLOOD PRESSURE: 82 MMHG | HEIGHT: 68 IN | HEART RATE: 68 BPM | BODY MASS INDEX: 28.79 KG/M2

## 2022-08-01 DIAGNOSIS — M20.42 HAMMER TOE OF LEFT FOOT: ICD-10-CM

## 2022-08-01 DIAGNOSIS — M20.41 HAMMER TOE OF RIGHT FOOT: ICD-10-CM

## 2022-08-01 DIAGNOSIS — B35.1 ONYCHOMYCOSIS: Primary | ICD-10-CM

## 2022-08-01 DIAGNOSIS — M10.9 ACUTE GOUT INVOLVING TOE OF LEFT FOOT, UNSPECIFIED CAUSE: ICD-10-CM

## 2022-08-01 DIAGNOSIS — M79.672 LEFT FOOT PAIN: ICD-10-CM

## 2022-08-01 DIAGNOSIS — M79.674 PAIN IN TOES OF BOTH FEET: ICD-10-CM

## 2022-08-01 DIAGNOSIS — M20.5X2 HALLUX LIMITUS OF LEFT FOOT: ICD-10-CM

## 2022-08-01 DIAGNOSIS — M79.675 PAIN IN TOES OF BOTH FEET: ICD-10-CM

## 2022-08-01 PROCEDURE — G8417 CALC BMI ABV UP PARAM F/U: HCPCS | Performed by: STUDENT IN AN ORGANIZED HEALTH CARE EDUCATION/TRAINING PROGRAM

## 2022-08-01 PROCEDURE — 3017F COLORECTAL CA SCREEN DOC REV: CPT | Performed by: STUDENT IN AN ORGANIZED HEALTH CARE EDUCATION/TRAINING PROGRAM

## 2022-08-01 PROCEDURE — 1036F TOBACCO NON-USER: CPT | Performed by: STUDENT IN AN ORGANIZED HEALTH CARE EDUCATION/TRAINING PROGRAM

## 2022-08-01 PROCEDURE — G8427 DOCREV CUR MEDS BY ELIG CLIN: HCPCS | Performed by: STUDENT IN AN ORGANIZED HEALTH CARE EDUCATION/TRAINING PROGRAM

## 2022-08-01 PROCEDURE — 11721 DEBRIDE NAIL 6 OR MORE: CPT | Performed by: STUDENT IN AN ORGANIZED HEALTH CARE EDUCATION/TRAINING PROGRAM

## 2022-08-01 PROCEDURE — 99213 OFFICE O/P EST LOW 20 MIN: CPT | Performed by: STUDENT IN AN ORGANIZED HEALTH CARE EDUCATION/TRAINING PROGRAM

## 2022-08-01 RX ORDER — NORTRIPTYLINE HYDROCHLORIDE 25 MG/1
25 CAPSULE ORAL NIGHTLY
Qty: 30 CAPSULE | Refills: 10 | OUTPATIENT
Start: 2022-08-01 | End: 2022-08-31

## 2022-08-01 NOTE — PROGRESS NOTES
[facility]  [address] [place] [address] [address] [facility], [address]  Tel: [phone]   Fax: [phone]    Subjective     CC: Left foot pain    Interval History: 8/1/2022  Patient returns to clinic today for follow up of left 1st MPJ pain. He did obtain the xray and uric acid level but was unable to start the process for obtaining orthotics. He explains the pain has not subsided entirely. The patient states he would also like to have his nails debrided today. HPI:  Trudy Solo is a 63y.o. year old male who returns to clinic today for follow up of left 1st MPJ pain. The patient explains the pain and swelling has reduced since his last visit. Describes that sometimes his big toe joint gets red, painful, and swollen and it has been occurring off and on for some time. He did forget to obtain the foot xrays previously ordered. Denies known history of gout. No new concerns today. Primary care physician is Deedee Wynne MD.    ROS:    Constitutional: Denies nausea, vomiting, fever, chills. Neurologic: Denies numbness, tingling, and burning in the feet. Vascular: Denies symptoms of lower extremity claudication. Skin: Denies open wounds. Otherwise negative except as noted in the HPI. Objective     Vitals:    08/01/22 1354   BP: (!) 180/82   Pulse: 68       Lab Results   Component Value Date    LABA1C 6.5 05/12/2022       No results found for: SEDRATE    Physical Exam:  General:  Alert and oriented x3. In no acute distress. Lower Extremity Physical Exam:    Vascular: DP and PT pulses are palpable. CFT <3 seconds to all digits. No pedal edema. Hair growth is absent to the level of all pedal digits. Neuro: Saph/sural/SP/DP/plantar sensation present to light touch. Protective sensation is intact to all sites as tested with a 5.07g SWMF to both feet. Musculoskeletal: EHL/FHL/GS/TA gross motor intact to the right and left LE. Pain & limited ROM of left 1st MPJ. Hammertoes 2-3, b/l. HAV b/l    Dermatologic: No open lesions, Bilateral.  Interdigital maceration absent, Bilateral.  Nails 1-10 are thickened, yellow, with subungual debris. Skin is shiny and atrophic. Biomechanical Exam:    R Hallux Dflex: 65 Pflex: >10  R 1st Ray D/Pflex: 5mm      R Ankle DF knee extended: 13  Right foot: Ankle DF knee flexed: >10  L Hallux Dflex: 65 Pflex: 5  L 1st Ray D/Pflex: 5mm  L Ankle DF knee extended: 13  L Ankle DF knee flexed: >10  Gait Analysis: Antalgic favoring right with limited toe off/propulsion  Tx plan: X-rays, shoe modifications, ice 20 minutes 3 times per day, possible custom foot orthoses with [employer]'s extension    Imaging:   No orders to display       Assessment   Josy Steinberg is a 63 y.o. male with     Diagnosis Orders   1. Onychomycosis        2. Hammer toe of right foot        3. Hammer toe of left foot        4. Acute gout involving toe of left foot, unspecified cause        5. Hallux limitus of left foot        6. Left foot pain            Plan   Patient examined and evaluated  Xrays reviewed; mild degenerative changes seen in the 1st MPJ b/l. Uric acid level reviewed: given it's elevated level explained to the patient that he would likely benefit from a prescription to help better control those levels, advised he see his PCP. Script for custom orthotics dispensed. Nails 1-10 debrided WOI with sterile nippers. Patient to [facility] in 4 months for nail care  Discussed with Dr. Wei Somers  Please, call the office with any questions or concerns     No orders of the defined types were placed in this encounter. No orders of the defined types were placed in this encounter.       Jesus Raya DPM   Podiatric Medicine & Surgery   8/1/2022 at 2:29 PM

## 2022-08-15 DIAGNOSIS — I10 ESSENTIAL HYPERTENSION: ICD-10-CM

## 2022-08-15 DIAGNOSIS — M54.50 CHRONIC LUMBOSACRAL PAIN: ICD-10-CM

## 2022-08-15 DIAGNOSIS — G89.29 CHRONIC BILATERAL LOW BACK PAIN WITHOUT SCIATICA: Chronic | ICD-10-CM

## 2022-08-15 DIAGNOSIS — G89.29 CHRONIC LUMBOSACRAL PAIN: ICD-10-CM

## 2022-08-15 DIAGNOSIS — M54.50 CHRONIC BILATERAL LOW BACK PAIN WITHOUT SCIATICA: Chronic | ICD-10-CM

## 2022-08-15 RX ORDER — NORTRIPTYLINE HYDROCHLORIDE 25 MG/1
25 CAPSULE ORAL NIGHTLY
Qty: 30 CAPSULE | Refills: 10 | OUTPATIENT
Start: 2022-08-15 | End: 2022-09-14

## 2022-08-16 RX ORDER — AMLODIPINE BESYLATE 10 MG/1
TABLET ORAL
Qty: 30 TABLET | Refills: 10 | Status: SHIPPED | OUTPATIENT
Start: 2022-08-16 | End: 2022-08-26

## 2022-08-16 RX ORDER — LIDOCAINE 50 MG/G
PATCH TOPICAL
Qty: 30 PATCH | Refills: 1 | Status: SHIPPED | OUTPATIENT
Start: 2022-08-16 | End: 2022-10-15

## 2022-08-16 NOTE — TELEPHONE ENCOUNTER
E-scribe request for med refills. Please review and e-scribe if applicable. Last Visit Date:  7/18/22  Next Visit Date:  8/16/2022    Hemoglobin A1C (%)   Date Value   05/12/2022 6.5   08/26/2021 9.0   05/03/2021 8.8             ( goal A1C is < 7)   Microalb/Crt. Ratio (mcg/mg creat)   Date Value   08/26/2021 693 (H)     LDL Cholesterol (mg/dL)   Date Value   08/26/2021 52     LDL Calculated (mg/dL)   Date Value   07/24/2013 120       (goal LDL is <100)   AST (U/L)   Date Value   08/26/2021 10     ALT (U/L)   Date Value   08/26/2021 10     BUN (mg/dL)   Date Value   08/26/2021 18     BP Readings from Last 3 Encounters:   08/01/22 (!) 180/82   07/18/22 135/80   06/30/22 (!) 154/68          (goal 120/80)        Patient Active Problem List:     HLD (hyperlipidemia)     Essential hypertension     DM (diabetes mellitus) ([address]     Chronic back pain     Thoracic aortic aneurysm diagnosed  2010?  at [facility]     Acute pain of right shoulder     Other male erectile dysfunction     Poorly controlled diabetes mellitus ([address]     Long term (current) use of antithrombotics/antiplatelets     Chronic bilateral low back pain without sciatica      ----Mary Crawford

## 2022-08-26 DIAGNOSIS — Z86.73 HISTORY OF STROKE: ICD-10-CM

## 2022-08-26 DIAGNOSIS — I10 ESSENTIAL HYPERTENSION: ICD-10-CM

## 2022-08-26 RX ORDER — AMLODIPINE BESYLATE 10 MG/1
TABLET ORAL
Qty: 30 TABLET | Refills: 10 | Status: SHIPPED | OUTPATIENT
Start: 2022-08-26

## 2022-08-26 RX ORDER — CLOPIDOGREL BISULFATE 75 MG/1
TABLET ORAL
Qty: 30 TABLET | Refills: 10 | Status: SHIPPED | OUTPATIENT
Start: 2022-08-26

## 2022-08-26 NOTE — TELEPHONE ENCOUNTER
Last visit: 07/18/2022  Last Med refill: 08/01/2022  Does patient have enough medication for 72 hours: No:     Next Visit Date:  Future Appointments   Date Time Provider Cuca Lu   9/1/2022  1:30 PM MD Avril Flor [id number]   12/5/2022  1:15 PM Ryder Garcia DPM [facility]   Topic Date Due    COVID-19 Vaccine (1) Never done    Shingles vaccine (1 of 2) Never done    Diabetic retinal exam  03/30/2016    Pneumococcal 0-64 years Vaccine (2 - PCV) 06/16/2018    Diabetic foot exam  09/18/2018    Diabetic microalbuminuria test  08/26/2022    Lipids  08/26/2022    Flu vaccine (1) 09/01/2022    A1C test (Diabetic or Prediabetic)  05/12/2023    Depression Screen  07/18/2023    Annual Wellness Visit (AWV)  07/19/2023    Colorectal Cancer Screen  08/25/2024    DTaP/Tdap/Td vaccine (2 - Td or Tdap) 06/16/2027    Hepatitis C screen  Addressed    HIV screen  Addressed    Hepatitis A vaccine  Aged Out    Hib vaccine  Aged Out    Meningococcal (ACWY) vaccine  Aged Out       Hemoglobin A1C (%)   Date Value   05/12/2022 6.5   08/26/2021 9.0   05/03/2021 8.8             ( goal A1C is < 7)   Microalb/Crt.  Ratio (mcg/mg creat)   Date Value   08/26/2021 693 (H)     LDL Cholesterol (mg/dL)   Date Value   08/26/2021 52   02/11/2016 89     LDL Calculated (mg/dL)   Date Value   07/24/2013 120       (goal LDL is <100)   AST (U/L)   Date Value   08/26/2021 10     ALT (U/L)   Date Value   08/26/2021 10     BUN (mg/dL)   Date Value   08/26/2021 18     BP Readings from Last 3 Encounters:   08/01/22 (!) 180/82   07/18/22 135/80   06/30/22 (!) 154/68          (goal 120/80)    All Future Testing planned in [facility]  Lab Frequency Next Occurrence   XR LUMBAR SPINE (2-3 VIEWS) Once 05/12/2022   PSA Screening Once 06/14/2022               Patient Active Problem List:     HLD (hyperlipidemia)     Essential hypertension     DM (diabetes mellitus) (H[facility])     Chronic back pain     Thoracic aortic aneurysm diagnosed  2010?  at [facility]     Acute pain of right shoulder     Other male erectile dysfunction     Poorly controlled diabetes mellitus ([facility] [address]     Long term (current) use of antithrombotics/antiplatelets     Chronic bilateral low back pain without sciatica

## 2022-09-01 ENCOUNTER — OFFICE VISIT (OUTPATIENT)
Dept: FAMILY MEDICINE CLINIC | Age: 62
End: 2022-09-01
Payer: COMMERCIAL

## 2022-09-01 VITALS
BODY MASS INDEX: 29.35 KG/M2 | DIASTOLIC BLOOD PRESSURE: 86 MMHG | SYSTOLIC BLOOD PRESSURE: 153 MMHG | HEART RATE: 75 BPM | WEIGHT: 193 LBS

## 2022-09-01 DIAGNOSIS — E11.65 TYPE 2 DIABETES MELLITUS WITH HYPERGLYCEMIA, WITHOUT LONG-TERM CURRENT USE OF INSULIN (HCC): Primary | ICD-10-CM

## 2022-09-01 LAB — HBA1C MFR BLD: 6.9 %

## 2022-09-01 PROCEDURE — 3044F HG A1C LEVEL LT 7.0%: CPT

## 2022-09-01 PROCEDURE — 2022F DILAT RTA XM EVC RTNOPTHY: CPT

## 2022-09-01 PROCEDURE — 99213 OFFICE O/P EST LOW 20 MIN: CPT

## 2022-09-01 PROCEDURE — 83036 HEMOGLOBIN GLYCOSYLATED A1C: CPT

## 2022-09-01 PROCEDURE — 3017F COLORECTAL CA SCREEN DOC REV: CPT

## 2022-09-01 PROCEDURE — G8427 DOCREV CUR MEDS BY ELIG CLIN: HCPCS

## 2022-09-01 PROCEDURE — G8417 CALC BMI ABV UP PARAM F/U: HCPCS

## 2022-09-01 PROCEDURE — 1036F TOBACCO NON-USER: CPT

## 2022-09-01 SDOH — ECONOMIC STABILITY: FOOD INSECURITY: WITHIN THE PAST 12 MONTHS, YOU WORRIED THAT YOUR FOOD WOULD RUN OUT BEFORE YOU GOT MONEY TO BUY MORE.: NEVER TRUE

## 2022-09-01 SDOH — ECONOMIC STABILITY: FOOD INSECURITY: WITHIN THE PAST 12 MONTHS, THE FOOD YOU BOUGHT JUST DIDN'T LAST AND YOU DIDN'T HAVE MONEY TO GET MORE.: NEVER TRUE

## 2022-09-01 ASSESSMENT — SOCIAL DETERMINANTS OF HEALTH (SDOH): HOW HARD IS IT FOR YOU TO PAY FOR THE VERY BASICS LIKE FOOD, HOUSING, MEDICAL CARE, AND HEATING?: NOT VERY HARD

## 2022-09-01 NOTE — PROGRESS NOTES
Subjective:    Samantha Schneider is a 63 y.o. male with  has a past medical history of Arthritis, Cerebral artery occlusion with cerebral infarction [facility]), Chronic back pain, Elbow pain, chronic, Full dentures, Fungus infection, Hyperlipidemia, Hypertension, Thoracic aortic aneurysm diagnosed  2010 at [facility], and Type II or unspecified type diabetes mellitus without mention of complication, not stated as uncontrolled. Presented to the office today for:  Chief Complaint   Patient presents with    Diabetes    Hypertension    Other     Every day activities, loosing balance        HPI    Patient is a 62 yo M with history of diabetes, hypertension presented to the clinic with complaints of balance issues that have been going on for the past week. Patient stated this mainly occurs when the patient gets up from a seated to standing position. Denies vertigo, falls, trauma to the head, also denies tinnitus. Review of Systems   Constitutional:  Negative for chills and fever. Respiratory:  Negative for shortness of breath. Cardiovascular:  Negative for chest pain. Gastrointestinal:  Negative for abdominal pain, constipation, diarrhea, nausea and vomiting. Neurological:  Negative for headaches. The patient has a   Family History   Problem Relation Age of Onset    High Blood Pressure Mother     Diabetes Mother     Cancer Father     Heart Disease Father     Stroke Brother     Cirrhosis Brother        Objective:    BP (!) 146/82   Pulse 68   Wt 193 lb (87.5 kg)   BMI 29.35 kg/m²    BP Readings from Last 3 Encounters:   09/01/22 (!) 146/82   08/01/22 (!) 180/82   07/18/22 135/80       Physical Exam  Constitutional:       Appearance: Normal appearance. HENT:      Head: Normocephalic and atraumatic. Cardiovascular:      Rate and Rhythm: Normal rate and regular rhythm. Pulmonary:      Effort: Pulmonary effort is normal. No respiratory distress. Breath sounds: Normal breath sounds.  No stridor. No wheezing or rales. Abdominal:      General: Abdomen is flat. There is no distension. Palpations: Abdomen is soft. There is no mass. Tenderness: There is no abdominal tenderness. There is no guarding. Hernia: No hernia is present. Neurological:      General: No focal deficit present. Mental Status: He is alert. Motor: No weakness. Lab Results   Component Value Date    WBC 7.4 02/24/2018    HGB 11.5 (L) 02/24/2018    HCT 37.7 (L) 02/24/2018     02/24/2018    CHOL 112 08/26/2021    TRIG 137 08/26/2021    HDL 33 (L) 08/26/2021    ALT 10 08/26/2021    AST 10 08/26/2021     08/26/2021    K 4.4 08/26/2021     08/26/2021    CREATININE 1.07 08/26/2021    BUN 18 08/26/2021    CO2 24 08/26/2021    LABA1C 6.5 05/12/2022    LABMICR 693 (H) 08/26/2021     Lab Results   Component Value Date    CALCIUM 9.5 08/26/2021     Lab Results   Component Value Date    LDLCALC 120 07/24/2013    LDLCHOLESTEROL 52 08/26/2021       Assessment and Plan:    1. Type 2 diabetes mellitus with hyperglycemia, without long-term current use of insulin ([facility])  - POCT glycosylated hemoglobin (Hb A1C) 6.9  - Reffered patient to ophthalmology for annual eye exam  - Lipid panel ordered    2. Orthostatic hypotension  - Orthostatic vitals were negative in the office  - Advised patient that before he stand up in the morning that she should wait 2-3minutes before getting up. Advised patient to stay well hydrated              Requested Prescriptions      No prescriptions requested or ordered in this encounter       There are no discontinued medications. Weston received counseling on the following healthy behaviors: nutrition, exercise and medication adherence    Discussed use,benefit, and side effects of prescribed medications. Barriers to medication compliance addressed. All patient questions answered. Pt voiced understanding. No follow-ups on file.         Disclaimer: Some orall of this note was transcribed using voice-recognition software. This may cause typographical errors occasionally. Although all effort is made to fix these errors, please do not hesitate to contact our office if there Roman Jordan concern with the understanding of this note.

## 2022-09-04 ASSESSMENT — ENCOUNTER SYMPTOMS
DIARRHEA: 0
CONSTIPATION: 0
SHORTNESS OF BREATH: 0
ABDOMINAL PAIN: 0
VOMITING: 0
NAUSEA: 0

## 2022-09-13 DIAGNOSIS — M54.50 CHRONIC BILATERAL LOW BACK PAIN WITHOUT SCIATICA: Chronic | ICD-10-CM

## 2022-09-13 DIAGNOSIS — G89.29 CHRONIC BILATERAL LOW BACK PAIN WITHOUT SCIATICA: Chronic | ICD-10-CM

## 2022-09-14 NOTE — TELEPHONE ENCOUNTER
Please advise pt last seen on 06/30/2022 no follow up in the chart 08/15/2022 no show with Corinne Verduzco

## 2022-09-15 RX ORDER — NORTRIPTYLINE HYDROCHLORIDE 25 MG/1
CAPSULE ORAL
Qty: 30 CAPSULE | Refills: 10 | OUTPATIENT
Start: 2022-09-15

## 2022-09-27 DIAGNOSIS — M54.50 CHRONIC BILATERAL LOW BACK PAIN WITHOUT SCIATICA: Chronic | ICD-10-CM

## 2022-09-27 DIAGNOSIS — G89.29 CHRONIC BILATERAL LOW BACK PAIN WITHOUT SCIATICA: Chronic | ICD-10-CM

## 2022-09-28 RX ORDER — NORTRIPTYLINE HYDROCHLORIDE 25 MG/1
CAPSULE ORAL
Qty: 30 CAPSULE | Refills: 10 | OUTPATIENT
Start: 2022-09-28

## 2022-10-13 DIAGNOSIS — G89.29 CHRONIC LUMBOSACRAL PAIN: ICD-10-CM

## 2022-10-13 DIAGNOSIS — I10 ESSENTIAL HYPERTENSION: ICD-10-CM

## 2022-10-13 DIAGNOSIS — M54.50 CHRONIC LUMBOSACRAL PAIN: ICD-10-CM

## 2022-10-14 DIAGNOSIS — M54.50 CHRONIC BILATERAL LOW BACK PAIN WITHOUT SCIATICA: Chronic | ICD-10-CM

## 2022-10-14 DIAGNOSIS — G89.29 CHRONIC BILATERAL LOW BACK PAIN WITHOUT SCIATICA: Chronic | ICD-10-CM

## 2022-10-14 RX ORDER — NORTRIPTYLINE HYDROCHLORIDE 25 MG/1
CAPSULE ORAL
Qty: 30 CAPSULE | Refills: 10 | OUTPATIENT
Start: 2022-10-14

## 2022-10-14 NOTE — TELEPHONE ENCOUNTER
Please address the medication refill and close the encounter. If I can be of assistance, please route to the applicable pool. Thank you. Last visit: 9-1-22  Last Med refill: 10-12-22  Does patient have enough medication for 72 hours: Yes    Next Visit Date:  Future Appointments   Date Time Provider Cuca Leigh   12/5/2022  1:15 PM Gume Hoyt DPM [facility]   Topic Date Due    COVID-19 Vaccine (1) Never done    Shingles vaccine (1 of 2) Never done    Diabetic retinal exam  03/30/2016    Diabetic foot exam  09/18/2018    Flu vaccine (1) Never done    Diabetic microalbuminuria test  08/26/2022    Lipids  08/26/2022    Depression Screen  07/18/2023    Annual Wellness Visit (AWV)  07/19/2023    A1C test (Diabetic or Prediabetic)  09/01/2023    Colorectal Cancer Screen  08/25/2024    DTaP/Tdap/Td vaccine (2 - Td or Tdap) 06/16/2027    Pneumococcal 0-64 years Vaccine  Completed    Hepatitis C screen  Addressed    HIV screen  Addressed    Hepatitis A vaccine  Aged Out    Hib vaccine  Aged Out    Meningococcal (ACWY) vaccine  Aged Out       Hemoglobin A1C (%)   Date Value   09/01/2022 6.9   05/12/2022 6.5   08/26/2021 9.0             ( goal A1C is < 7)   Microalb/Crt.  Ratio (mcg/mg creat)   Date Value   08/26/2021 693 (H)     LDL Cholesterol (mg/dL)   Date Value   08/26/2021 52   02/11/2016 89     LDL Calculated (mg/dL)   Date Value   07/24/2013 120       (goal LDL is <100)   AST (U/L)   Date Value   08/26/2021 10     ALT (U/L)   Date Value   08/26/2021 10     BUN (mg/dL)   Date Value   08/26/2021 18     BP Readings from Last 3 Encounters:   09/01/22 (!) 153/86   08/01/22 (!) 180/82   07/18/22 135/80          (goal 120/80)    All Future Testing planned in [facility]  Lab Frequency Next Occurrence   XR LUMBAR SPINE (2-3 VIEWS) Once 05/12/2022   PSA Screening Once 06/14/2022   Lipid Panel Once 09/01/2022   Microalbumin, Ur Once 09/01/2022               Patient Active Problem List:     HLD (hyperlipidemia)     Essential hypertension     DM (diabetes mellitus) ([facility] [address]     Chronic back pain     Thoracic aortic aneurysm diagnosed  2010?  at [facility]     Acute pain of right shoulder     Other male erectile dysfunction     Poorly controlled diabetes mellitus ([facility] [address]     Long term (current) use of antithrombotics/antiplatelets     Chronic bilateral low back pain without sciatica

## 2022-10-14 NOTE — TELEPHONE ENCOUNTER
Please address the medication refill and close the encounter. If I can be of assistance, please route to the applicable pool. Thank you. Last visit: 9-1-22  Last Med refill: 10-12-22  Does patient have enough medication for 72 hours: No:     Next Visit Date:  Future Appointments   Date Time Provider Cuca Leigh   12/5/2022  1:15 PM Nino Diego DPM [facility]   Topic Date Due    COVID-19 Vaccine (1) Never done    Shingles vaccine (1 of 2) Never done    Diabetic retinal exam  03/30/2016    Diabetic foot exam  09/18/2018    Flu vaccine (1) Never done    Diabetic microalbuminuria test  08/26/2022    Lipids  08/26/2022    Depression Screen  07/18/2023    Annual Wellness Visit (AWV)  07/19/2023    A1C test (Diabetic or Prediabetic)  09/01/2023    Colorectal Cancer Screen  08/25/2024    DTaP/Tdap/Td vaccine (2 - Td or Tdap) 06/16/2027    Pneumococcal 0-64 years Vaccine  Completed    Hepatitis C screen  Addressed    HIV screen  Addressed    Hepatitis A vaccine  Aged Out    Hib vaccine  Aged Out    Meningococcal (ACWY) vaccine  Aged Out       Hemoglobin A1C (%)   Date Value   09/01/2022 6.9   05/12/2022 6.5   08/26/2021 9.0             ( goal A1C is < 7)   Microalb/Crt.  Ratio (mcg/mg creat)   Date Value   08/26/2021 693 (H)     LDL Cholesterol (mg/dL)   Date Value   08/26/2021 52   02/11/2016 89     LDL Calculated (mg/dL)   Date Value   07/24/2013 120       (goal LDL is <100)   AST (U/L)   Date Value   08/26/2021 10     ALT (U/L)   Date Value   08/26/2021 10     BUN (mg/dL)   Date Value   08/26/2021 18     BP Readings from Last 3 Encounters:   09/01/22 (!) 153/86   08/01/22 (!) 180/82   07/18/22 135/80          (goal 120/80)    All Future Testing planned in [facility]  Lab Frequency Next Occurrence   XR LUMBAR SPINE (2-3 VIEWS) Once 05/12/2022   PSA Screening Once 06/14/2022   Lipid Panel Once 09/01/2022   Microalbumin, Ur Once 09/01/2022               Patient Active

## 2022-10-15 RX ORDER — LIDOCAINE 50 MG/G
PATCH TOPICAL
Qty: 30 PATCH | Refills: 10 | Status: SHIPPED | OUTPATIENT
Start: 2022-10-15

## 2022-10-15 RX ORDER — HYDROCHLOROTHIAZIDE 50 MG/1
TABLET ORAL
Qty: 30 TABLET | Refills: 10 | Status: SHIPPED | OUTPATIENT
Start: 2022-10-15

## 2022-12-05 ENCOUNTER — OFFICE VISIT (OUTPATIENT)
Dept: PODIATRY | Age: 62
End: 2022-12-05
Payer: MEDICARE

## 2022-12-05 VITALS
BODY MASS INDEX: 29.1 KG/M2 | DIASTOLIC BLOOD PRESSURE: 86 MMHG | HEART RATE: 64 BPM | HEIGHT: 68 IN | WEIGHT: 192 LBS | SYSTOLIC BLOOD PRESSURE: 156 MMHG

## 2022-12-05 DIAGNOSIS — B35.1 ONYCHOMYCOSIS: Primary | ICD-10-CM

## 2022-12-05 DIAGNOSIS — M20.5X2 HALLUX LIMITUS OF LEFT FOOT: ICD-10-CM

## 2022-12-05 DIAGNOSIS — M20.41 HAMMER TOE OF RIGHT FOOT: ICD-10-CM

## 2022-12-05 DIAGNOSIS — E11.51 DIABETES MELLITUS WITH PERIPHERAL VASCULAR DISEASE (HCC): ICD-10-CM

## 2022-12-05 DIAGNOSIS — I73.9 PERIPHERAL VASCULAR DISORDER (HCC): ICD-10-CM

## 2022-12-05 DIAGNOSIS — M79.672 LEFT FOOT PAIN: ICD-10-CM

## 2022-12-05 DIAGNOSIS — M20.42 HAMMER TOE OF LEFT FOOT: ICD-10-CM

## 2022-12-05 PROCEDURE — 11721 DEBRIDE NAIL 6 OR MORE: CPT

## 2022-12-05 NOTE — PROGRESS NOTES
Patient instructed to remove shoes and socks and instructed to sit in exam chair. Current PCP is Simone Johnson MD and date of last visit was 09/19/2022. Do you have a follow up visit scheduled? No  If yes, the date is unknown. Diabetic visit information    Blood pressure (Control is BP <140/90)  BP Readings from Last 3 Encounters:   09/01/22 (!) 153/86   08/01/22 (!) 180/82   07/18/22 135/80       BP taken with correct size cuff? - Yes   Repeated if > 140/90 Yes      Tobacco use:  Patient  reports that he quit smoking about 24 years ago. His smoking use included cigarettes. He has a 2.50 pack-year smoking history. He has never used smokeless tobacco.  If Smoker - Cessation materials given? - Yes       Diabetic Health Maintenance Items due  Diabetes Management   Topic Date Due    Diabetic retinal exam  03/30/2016    Diabetic foot exam  09/18/2018    Diabetic microalbuminuria test  08/26/2022    Lipids  08/26/2022       Diabetic retinal exam done in last year? - Yes   If No: remind patient that it is due and they should schedule an exam    Medications  Is patient taking any medications for diabetes? -   Yes  Have blood sugars been controlled? Fasting blood sugars under 120   -   Yes   Random home sugars or today's POCT glucose is under 180 -   Yes   []  If No to the above then patient should schedule appt with PCP.      Diabetic Plan    A1C Plan  Lab Results   Component Value Date    LABA1C 6.9 09/01/2022    LABA1C 6.5 05/12/2022    LABA1C 9.0 08/26/2021      []  If A1C over 8 and last result >3 months ago - Order A1C and refer to PCP   []  If last A1C over 6 months ago - Order A1C and refer to PCP for follow up   []  If elevated blood sugars > 180 - refer to PCP for follow up    []  Blood sugar controlled - A1C under 8 and last check was < 6 months      Cholesterol Plan   Lab Results   Component Value Date    LDLCALC 120 07/24/2013    LDLCHOLESTEROL 52 08/26/2021      []  If LDL > 100 and last result >3 months ago - order Fasting lipids and refer to PCP for follow up   []  If LDL < 100 and over 1 year ago - Order Fasting lipids and refer to PCP for follow up   [] LDL is controlled. LDL < 100 and checked within the last year     Blood Pressure  BP Readings from Last 3 Encounters:   09/01/22 (!) 153/86   08/01/22 (!) 180/82   07/18/22 135/80      []  If SBP >140 mmhg - refer to PCP for follow up   []  If DBP > 90 mmhg - refer to PCP for follow up   [] BP is controlled <140/90     Order labs as PCP ordered.   (ie: Lipids, A1C, CMP)

## 2022-12-05 NOTE — PROGRESS NOTES
[facility]  [address] [place] [address], 1 JESSE Muñiz  Tel: [phone]   Fax: [phone]    Subjective     CC: Left foot pain    Interval History: 8/1/2022  Patient returns to clinic today for follow up diabetic foot care. Pt states she had his xrays done a while back. He also states he has left foot pain at the 1st MPJ. He states he has orthotics and has not used them. The patient states he would also like to have his nails debrided today. HPI:  Gregorio Espinal is a 63y.o. year old male who returns to clinic today for follow up of left 1st MPJ pain. The patient explains the pain and swelling has reduced since his last visit. Describes that sometimes his big toe joint gets red, painful, and swollen and it has been occurring off and on for some time. He did forget to obtain the foot xrays previously ordered. Denies known history of gout. No new concerns today. Primary care physician is Lulu Collins MD.    ROS:    Constitutional: Denies nausea, vomiting, fever, chills. Neurologic: Denies numbness, tingling, and burning in the feet. Vascular: Denies symptoms of lower extremity claudication. Skin: Denies open wounds. Otherwise negative except as noted in the HPI. Objective     Vitals:    12/05/22 1327   BP: (!) 156/86   Pulse: 64       Lab Results   Component Value Date    LABA1C 6.9 09/01/2022       No results found for: SEDRATE    Physical Exam:  General:  Alert and oriented x3. In no acute distress. Lower Extremity Physical Exam:    Vascular: DP and PT pulses are non palpable but audible on doppler. CFT <3 seconds to all digits. No pedal edema. Hair growth is absent to the level of all pedal digits. Neuro: Saph/sural/SP/DP/plantar sensation present to light touch. Protective sensation is intact to all sites as tested with a 5.07g SWMF to both feet. Musculoskeletal: EHL/FHL/GS/TA gross motor intact to the right and left LE.  Pain & limited ROM of left 1st MPJ.  Hammertoes 2-3, b/l. HAV b/l    Dermatologic: No open lesions, Bilateral.  Interdigital maceration absent, Bilateral.  Nails 1-10 are thickened, yellow, with subungual debris. Skin is shiny and atrophic. Imaging:   VL ARTERIAL PVR LOWER WO EXERCISE    (Results Pending)       Assessment   Lexi Trevino is a 63 y.o. male with     Diagnosis Orders   1. Onychomycosis        2. Peripheral vascular disorder ([address]  Ca Georges MD, Vascular Surgery, [place]    VL ARTERIAL PVR LOWER WO EXERCISE      3. Hammer toe of right foot        4. Hammer toe of left foot            Plan   Patient examined and evaluated  Xrays reviewed; mild degenerative changes seen in the 1st MPJ b/l. Vascular referred due to decreased pulses and history of open heart surgery  PVRs ordered. Nails 1-10 debrided WOI with sterile nippers. Patient to RTC in 3 months for nail care  Discussed with Dr. Mackenzie Marlow  Please, call the office with any questions or concerns     No orders of the defined types were placed in this encounter.     Orders Placed This Encounter   Procedures    VL ARTERIAL PVR LOWER WO EXERCISE     Standing Status:   Future     Standing Expiration Date:   12/5/2023     Order Specific Question:   Reason for exam:     Answer:   Decreased pulses    Ca Georges MD, Vascular Surgery, [address]     Referral Priority:   Routine     Referral Type:   Eval and Treat     Referral Reason:   Specialty Services Required     Referred to Provider:   Srinivasa Marquez MD     Requested Specialty:   Vascular Surgery     Number of Visits Requested:   [address]   Podiatric Medicine & Surgery   12/5/2022 at 2:50 PM

## 2022-12-08 ENCOUNTER — HOSPITAL ENCOUNTER (OUTPATIENT)
Dept: VASCULAR LAB | Age: 62
Discharge: HOME | End: 2022-12-08
Payer: MEDICARE

## 2022-12-08 DIAGNOSIS — I73.9 PERIPHERAL VASCULAR DISORDER (HCC): ICD-10-CM

## 2022-12-08 PROCEDURE — 93923 UPR/LXTR ART STDY 3+ LVLS: CPT

## 2022-12-13 RX ORDER — HYDRALAZINE HYDROCHLORIDE 10 MG/1
TABLET, FILM COATED ORAL
Qty: 90 TABLET | Refills: 10 | Status: SHIPPED | OUTPATIENT
Start: 2022-12-13

## 2022-12-13 NOTE — TELEPHONE ENCOUNTER
Last visit:   Last Med refill:   Does patient have enough medication for 72 hours: No:     Next Visit Date:  Future Appointments   Date Time Provider Cuca Leigh   3/6/2023 12:45 PM Beto Rider DPM [facility]   Topic Date Due    COVID-19 Vaccine (1) Never done    Shingles vaccine (1 of 2) Never done    Diabetic retinal exam  03/30/2016    Diabetic foot exam  09/18/2018    Flu vaccine (1) Never done    Diabetic microalbuminuria test  08/26/2022    Lipids  08/26/2022    Depression Screen  07/18/2023    Annual Wellness Visit (AWV)  07/19/2023    A1C test (Diabetic or Prediabetic)  09/01/2023    Colorectal Cancer Screen  08/25/2024    DTaP/Tdap/Td vaccine (2 - Td or Tdap) 06/16/2027    Pneumococcal 0-64 years Vaccine  Completed    Hepatitis C screen  Addressed    HIV screen  Addressed    Hepatitis A vaccine  Aged Out    Hib vaccine  Aged Out    Meningococcal (ACWY) vaccine  Aged Out       Hemoglobin A1C (%)   Date Value   09/01/2022 6.9   05/12/2022 6.5   08/26/2021 9.0             ( goal A1C is < 7)   Microalb/Crt.  Ratio (mcg/mg creat)   Date Value   08/26/2021 693 (H)     LDL Cholesterol (mg/dL)   Date Value   08/26/2021 52   02/11/2016 89     LDL Calculated (mg/dL)   Date Value   07/24/2013 120       (goal LDL is <100)   AST (U/L)   Date Value   08/26/2021 10     ALT (U/L)   Date Value   08/26/2021 10     BUN (mg/dL)   Date Value   08/26/2021 18     BP Readings from Last 3 Encounters:   12/05/22 (!) 156/86   09/01/22 (!) 153/86   08/01/22 (!) 180/82          (goal 120/80)    All Future Testing planned in [facility]  Lab Frequency Next Occurrence   XR LUMBAR SPINE (2-3 VIEWS) Once 05/12/2022   PSA Screening Once 06/14/2022   Lipid Panel Once 09/01/2022   Microalbumin, Ur Once 09/01/2022               Patient Active Problem List:     HLD (hyperlipidemia)     Essential hypertension     DM (diabetes mellitus) ([address]     Chronic back pain     Thoracic aortic aneurysm diagnosed 2010? at [facility]     Acute pain of right shoulder     Other male erectile dysfunction     Poorly controlled diabetes mellitus ([facility]     Long term (current) use of antithrombotics/antiplatelets     Chronic bilateral low back pain without sciatica       Please address the medication refill and close the encounter. If I can be of assistance, please route to the applicable pool. Thank you.

## 2022-12-30 ENCOUNTER — INITIAL CONSULT (OUTPATIENT)
Dept: VASCULAR SURGERY | Age: 62
End: 2022-12-30
Payer: MEDICARE

## 2022-12-30 VITALS
DIASTOLIC BLOOD PRESSURE: 85 MMHG | RESPIRATION RATE: 17 BRPM | BODY MASS INDEX: 28.79 KG/M2 | HEART RATE: 64 BPM | OXYGEN SATURATION: 94 % | HEIGHT: 68 IN | TEMPERATURE: 98.9 F | SYSTOLIC BLOOD PRESSURE: 169 MMHG | WEIGHT: 190 LBS

## 2022-12-30 DIAGNOSIS — I70.213 ATHEROSCLER OF NATIVE ARTERY OF BOTH LEGS WITH INTERMIT CLAUDICATION (HCC): Primary | ICD-10-CM

## 2022-12-30 PROCEDURE — G8427 DOCREV CUR MEDS BY ELIG CLIN: HCPCS | Performed by: SURGERY

## 2022-12-30 PROCEDURE — 99203 OFFICE O/P NEW LOW 30 MIN: CPT | Performed by: SURGERY

## 2022-12-30 PROCEDURE — G8417 CALC BMI ABV UP PARAM F/U: HCPCS | Performed by: SURGERY

## 2022-12-30 PROCEDURE — 3077F SYST BP >= 140 MM HG: CPT | Performed by: SURGERY

## 2022-12-30 PROCEDURE — 1036F TOBACCO NON-USER: CPT | Performed by: SURGERY

## 2022-12-30 PROCEDURE — 3017F COLORECTAL CA SCREEN DOC REV: CPT | Performed by: SURGERY

## 2022-12-30 PROCEDURE — 3079F DIAST BP 80-89 MM HG: CPT | Performed by: SURGERY

## 2022-12-30 PROCEDURE — G8484 FLU IMMUNIZE NO ADMIN: HCPCS | Performed by: SURGERY

## 2022-12-30 RX ORDER — CYCLOBENZAPRINE HCL 10 MG
TABLET ORAL
COMMUNITY
Start: 2022-12-12

## 2022-12-30 ASSESSMENT — ENCOUNTER SYMPTOMS
SHORTNESS OF BREATH: 0
EYE PAIN: 0
VOMITING: 0
ABDOMINAL PAIN: 0
COUGH: 0
TROUBLE SWALLOWING: 0
ABDOMINAL DISTENTION: 0
COLOR CHANGE: 0
CHEST TIGHTNESS: 0
VOICE CHANGE: 0

## 2022-12-30 NOTE — PROGRESS NOTES
[facility]  [address] [facility]  [address] [place] [address]  Dept: [phone]     Patient: Breezy See  : 1960  MRN: [id number]  DOS: 2022    Referring provider:  Rahul Mckinnon DPM         HPI:  Breezy See is a 63 y.o. male who comes to the office for the first time regarding abnormal ADAN bilaterally. He has noncompressible ADAN bilaterally but does not complain of claudication rest pain or ulceration. He can walk as far as he wants. He does not really have diabetic neuropathy. He has been diabetic for approximately 10 years. He had an ascending aortic aneurysm repair at [facility] by Dr. Farhat Jacobo which went well approximately 7 years ago. He has not seen him for quite some time as he was discharged from care. He does not smoke but did smoke approximately 20 to 30 years ago. He has high blood pressure and high cholesterol as well. Past Medical History:   Diagnosis Date    Arthritis     general    Cerebral artery occlusion with cerebral infarction (HCC)     mild    Chronic back pain     Elbow pain, chronic     Left    Full dentures     Fungus infection     bilat.  feet    Hyperlipidemia     Hypertension     Thoracic aortic aneurysm diagnosed   at [facility]     Type II or unspecified type diabetes mellitus without mention of complication, not stated as uncontrolled      Family History   Problem Relation Age of Onset    High Blood Pressure Mother     Diabetes Mother     Cancer Father     Heart Disease Father     Stroke Brother     Cirrhosis Brother       Social History     Socioeconomic History    Marital status:      Spouse name: Not on file    Number of children: Not on file    Years of education: Not on file    Highest education level: Not on file   Occupational History    Not on file   Tobacco Use    Smoking status: Former     Packs/day: 0.25     Years: 10.00     Pack years: 2.50 Types: Cigarettes     Quit date: 10/1/1998     Years since quittin.2    Smokeless tobacco: Never   Vaping Use    Vaping Use: Never used   Substance and Sexual Activity    Alcohol use: Yes     Alcohol/week: 3.0 standard drinks     Types: 3 Cans of beer per week     Comment: every weekend    Drug use: Yes     Types: Marijuana [place])     Comment: last use 2017    Sexual activity: Yes     Partners: Female   Other Topics Concern    Not on file   Social History Narrative    Not on file     Social Determinants of Health     Financial Resource Strain: Low Risk     Difficulty of Paying Living Expenses: Not very hard   Food Insecurity: No Food Insecurity    Worried About Running Out of Food in the Last Year: Never true    Ran Out of Food in the Last Year: Never true   Transportation Needs: Not on file   Physical Activity: Insufficiently Active    Days of Exercise per Week: 7 days    Minutes of Exercise per Session: 10 min   Stress: Not on file   Social Connections: Not on file   Intimate Partner Violence: Not on file   Housing Stability: Not on file      Past Surgical History:   Procedure Laterality Date    COLONOSCOPY  14    biopsy    DENTAL SURGERY      all teeth removed    ELBOW ARTHROSCOPY Right 2018    with loose body removal    VT ELBOW ARTHROSCOP,DIAGNOSTIC Left 2018    ELBOW ARTHROSCOPY, REMOVAL OF LOOSE BODY AND DEBRIDEMENT ([address], NORMAL-SCOPE EQUIPMENT, ARM RYAN, LATERAL W/BEAN BAG, [address] TABLE, SIMPLE SLING) performed by Pardeep Menchaca DO at [address] [place] [address]  2012    AORTIC ANEURYSM REPAIR      Review of Systems   Constitutional:  Negative for activity change, appetite change, fever and unexpected weight change. HENT:  Negative for congestion, trouble swallowing and voice change. Eyes:  Negative for pain and visual disturbance. Respiratory:  Negative for cough, chest tightness and shortness of breath.     Cardiovascular:  Negative for chest pain and palpitations. Gastrointestinal:  Negative for abdominal distention, abdominal pain and vomiting. Endocrine: Negative for cold intolerance and heat intolerance. Genitourinary:  Negative for dysuria, flank pain and hematuria. Musculoskeletal:  Negative for joint swelling and neck pain. Skin:  Negative for color change and rash. Allergic/Immunologic: Negative for immunocompromised state. Neurological:  Negative for syncope, speech difficulty, weakness, numbness and headaches. Hematological:  Negative for adenopathy. Psychiatric/Behavioral:  Negative for agitation and confusion. Vitals:    12/30/22 1441   BP: (!) 169/85   Site: Right Upper Arm   Position: Sitting   Cuff Size: Medium Adult   Pulse: 64   Resp: 17   Temp: 98.9 °F (37.2 °C)   TempSrc: Temporal   SpO2: 94%   Weight: 190 lb (86.2 kg)   Height: 5' 8\" (1.727 m)          Physical Exam  Constitutional:       General: He is not in acute distress. Appearance: He is not ill-appearing. HENT:      Mouth/Throat:      Mouth: Mucous membranes are moist.      Pharynx: Oropharynx is clear. Eyes:      General: No scleral icterus. Extraocular Movements: Extraocular movements intact. Conjunctiva/sclera: Conjunctivae normal.   Neck:      Thyroid: No thyroid mass or thyromegaly. Cardiovascular:      Comments: There are no carotid bruits. Carotid pulses are normal.  The chest is clear to auscultation bilaterally. The heart has a regular rate and rhythm with no murmurs rubs or gallops. The abdomen is soft without tenderness. There are no masses. There is no evidence of aneurysmal disease. The femoral pulses are palpable and equal bilaterally. The popliteal pulses are palpable but slightly weak. He does not have palpable dorsalis pedis or posterior tibial pulses in either lower extremity. .  The feet are warm and well perfused without ulceration or gangrene. There is no significant edema.   There are no varicosities. Pulmonary:      Effort: No respiratory distress. Breath sounds: No rales. Abdominal:      General: There is no distension. Palpations: There is no mass. Tenderness: There is no abdominal tenderness. There is no guarding or rebound. Musculoskeletal:      Cervical back: No rigidity or tenderness. Lymphadenopathy:      Cervical: No cervical adenopathy. Skin:     Coloration: Skin is not jaundiced. Findings: No rash. Neurological:      General: No focal deficit present. Mental Status: He is alert and oriented to person, place, and time. Cranial Nerves: No cranial nerve deficit. Psychiatric:         Mood and Affect: Mood normal.       Assessment:  1. Atheroscler of native artery of both legs with intermit claudication ([facility] [address]          Plan: At this point he has no symptoms of arterial disease but does have signs of tibial arterial disease typical of diabetics. I would not offer him any surgical therapy. We will see him at 6-month intervals with lower extremity arterial duplex and lower extremity ADAN and PVR. He understands and agrees and we will see him at that time.     Electronically signed by:  Bartolo Castro MD

## 2023-02-20 ENCOUNTER — OFFICE VISIT (OUTPATIENT)
Dept: FAMILY MEDICINE CLINIC | Age: 63
End: 2023-02-20
Payer: MEDICARE

## 2023-02-20 VITALS
SYSTOLIC BLOOD PRESSURE: 118 MMHG | TEMPERATURE: 97.6 F | HEART RATE: 64 BPM | HEIGHT: 68 IN | DIASTOLIC BLOOD PRESSURE: 77 MMHG | WEIGHT: 187.4 LBS | BODY MASS INDEX: 28.4 KG/M2

## 2023-02-20 DIAGNOSIS — M25.559 HIP PAIN: ICD-10-CM

## 2023-02-20 DIAGNOSIS — I70.213 ATHEROSCLER OF NATIVE ARTERY OF BOTH LEGS WITH INTERMIT CLAUDICATION (HCC): ICD-10-CM

## 2023-02-20 DIAGNOSIS — G89.29 CHRONIC MIDLINE LOW BACK PAIN WITH BILATERAL SCIATICA: Primary | ICD-10-CM

## 2023-02-20 DIAGNOSIS — Z95.1 HX OF CABG: ICD-10-CM

## 2023-02-20 DIAGNOSIS — E11.65 TYPE 2 DIABETES MELLITUS WITH HYPERGLYCEMIA, WITHOUT LONG-TERM CURRENT USE OF INSULIN (HCC): ICD-10-CM

## 2023-02-20 DIAGNOSIS — M54.42 CHRONIC MIDLINE LOW BACK PAIN WITH BILATERAL SCIATICA: Primary | ICD-10-CM

## 2023-02-20 DIAGNOSIS — M54.41 CHRONIC MIDLINE LOW BACK PAIN WITH BILATERAL SCIATICA: Primary | ICD-10-CM

## 2023-02-20 DIAGNOSIS — Z13.220 SCREENING FOR HYPERLIPIDEMIA: ICD-10-CM

## 2023-02-20 LAB — HBA1C MFR BLD: 9.8 %

## 2023-02-20 PROCEDURE — 3017F COLORECTAL CA SCREEN DOC REV: CPT

## 2023-02-20 PROCEDURE — G8417 CALC BMI ABV UP PARAM F/U: HCPCS

## 2023-02-20 PROCEDURE — 99213 OFFICE O/P EST LOW 20 MIN: CPT

## 2023-02-20 PROCEDURE — 99211 OFF/OP EST MAY X REQ PHY/QHP: CPT | Performed by: FAMILY MEDICINE

## 2023-02-20 PROCEDURE — 3074F SYST BP LT 130 MM HG: CPT

## 2023-02-20 PROCEDURE — 3078F DIAST BP <80 MM HG: CPT

## 2023-02-20 PROCEDURE — 83036 HEMOGLOBIN GLYCOSYLATED A1C: CPT

## 2023-02-20 PROCEDURE — 2022F DILAT RTA XM EVC RTNOPTHY: CPT

## 2023-02-20 PROCEDURE — 3046F HEMOGLOBIN A1C LEVEL >9.0%: CPT

## 2023-02-20 PROCEDURE — G8427 DOCREV CUR MEDS BY ELIG CLIN: HCPCS

## 2023-02-20 PROCEDURE — 1036F TOBACCO NON-USER: CPT

## 2023-02-20 PROCEDURE — G8484 FLU IMMUNIZE NO ADMIN: HCPCS

## 2023-02-20 SDOH — ECONOMIC STABILITY: HOUSING INSECURITY
IN THE LAST 12 MONTHS, WAS THERE A TIME WHEN YOU DID NOT HAVE A STEADY PLACE TO SLEEP OR SLEPT IN A SHELTER (INCLUDING NOW)?: NO

## 2023-02-20 SDOH — ECONOMIC STABILITY: INCOME INSECURITY: HOW HARD IS IT FOR YOU TO PAY FOR THE VERY BASICS LIKE FOOD, HOUSING, MEDICAL CARE, AND HEATING?: NOT HARD AT ALL

## 2023-02-20 SDOH — ECONOMIC STABILITY: FOOD INSECURITY: WITHIN THE PAST 12 MONTHS, YOU WORRIED THAT YOUR FOOD WOULD RUN OUT BEFORE YOU GOT MONEY TO BUY MORE.: NEVER TRUE

## 2023-02-20 SDOH — ECONOMIC STABILITY: FOOD INSECURITY: WITHIN THE PAST 12 MONTHS, THE FOOD YOU BOUGHT JUST DIDN'T LAST AND YOU DIDN'T HAVE MONEY TO GET MORE.: NEVER TRUE

## 2023-02-20 ASSESSMENT — ENCOUNTER SYMPTOMS
SHORTNESS OF BREATH: 0
NAUSEA: 0
VOMITING: 0
DIARRHEA: 0
ABDOMINAL PAIN: 0
CONSTIPATION: 0
BACK PAIN: 1

## 2023-02-20 ASSESSMENT — PATIENT HEALTH QUESTIONNAIRE - PHQ9
1. LITTLE INTEREST OR PLEASURE IN DOING THINGS: 0
SUM OF ALL RESPONSES TO PHQ9 QUESTIONS 1 & 2: 0
SUM OF ALL RESPONSES TO PHQ QUESTIONS 1-9: 0
2. FEELING DOWN, DEPRESSED OR HOPELESS: 0

## 2023-02-20 NOTE — PROGRESS NOTES
Attending Physician Statement  I have discussed the care of Erlinda Mcqueen, 63 y.o. male,including pertinent history and exam findings,  with the resident Dr. Home Bonilla MD.  History:  Chief Complaint   Patient presents with    Diabetes    Health Maintenance     Pt stated had DM eye exam done last year on [facility] [address] uncertain of the name, pt declined flu vaccine      I have reviewed the key elements of the encounter with the resident. Examination was done by resident as documented in residents note. BP Readings from Last 3 Encounters:   02/20/23 118/77   12/30/22 (!) 169/85   12/05/22 (!) 156/86     /77 (Site: Left Upper Arm, Position: Sitting, Cuff Size: Medium Adult)   Pulse 64   Temp 97.6 °F (36.4 °C) (Oral)   Ht 5' 8\" (1.727 m)   Wt 187 lb 6.4 oz (85 kg)   BMI 28.49 kg/m²   Lab Results   Component Value Date    WBC 7.4 02/24/2018    HGB 11.5 (L) 02/24/2018    HCT 37.7 (L) 02/24/2018     02/24/2018    CHOL 112 08/26/2021    TRIG 137 08/26/2021    HDL 33 (L) 08/26/2021    ALT 10 08/26/2021    AST 10 08/26/2021     08/26/2021    K 4.4 08/26/2021     08/26/2021    CREATININE 1.07 08/26/2021    BUN 18 08/26/2021    CO2 24 08/26/2021    LABA1C 9.8 02/20/2023    LABMICR 693 (H) 08/26/2021     Lab Results   Component Value Date    CALCIUM 9.5 08/26/2021     Lab Results   Component Value Date    LDLCALC 120 07/24/2013    LDLCHOLESTEROL 52 08/26/2021     I agree with the assessment, plan and diagnosis of    Diagnosis Orders   1. Chronic midline low back pain with bilateral sciatica  XR LUMBAR SPINE (2-3 VIEWS)      2. Type 2 diabetes mellitus with hyperglycemia, without long-term current use of insulin (HCC)  Microalbumin, Ur    POCT glycosylated hemoglobin (Hb A1C)    Basic Metabolic Panel    empagliflozin (JARDIANCE) 10 MG tablet      3. Atheroscler of native artery of both legs with intermit claudication ([address]        4.  Hip pain  XR HIP 3-4 VW W PELVIS BILATERAL    diclofenac sodium (VOLTAREN) 1 % GEL      5. Hx of CABG  AFL - Ruddy, Kvng, DO, Cardiology, [place]      6. Screening for hyperlipidemia  Lipid Panel        I agree with  orders as documented by the resident. Recommendations: Agree with resident A&P. Resident team to perform further hip and back examination at next visit. Resident team to also inquire as to why patient stopped previousely taking medications and checking blood sugars/other barriers. Return in about 4 weeks (around 3/20/2023) for back pain follow up.    (Othelia [employer] ) Dr. Violet Spears MD

## 2023-02-20 NOTE — PROGRESS NOTES
Diabetic visit information    BP Readings from Last 3 Encounters:   12/30/22 (!) 169/85   12/05/22 (!) 156/86   09/01/22 (!) 153/86       Hemoglobin A1C (%)   Date Value   09/01/2022 6.9   05/12/2022 6.5   08/26/2021 9.0     Microalb/Crt. Ratio (mcg/mg creat)   Date Value   08/26/2021 693 (H)     LDL Cholesterol (mg/dL)   Date Value   08/26/2021 52     LDL Calculated (mg/dL)   Date Value   07/24/2013 120               Have you changed or started any medications since your last visit including any over-the-counter medicines, vitamins, or herbal medicines? no   Have you stopped taking any of your medications? Is so, why? -  yes - see list  Are you having any side effects from any of your medications? - no    Have you seen any other physician or provider since your last visit?  no   Have you had any other diagnostic tests since your last visit?  no   Have you been seen in the emergency room and/or had an admission in a hospital since we last saw you?  no     Have you had your annual diabetic retinal (eye) exam? Yes   (ensure copy of exam is in the chart)    Have you had your routine dental cleaning in the past 6 months? no    Do you have an active [email or web address] account? If not, what are your barriers? No: declined    Patient Care Team:  Rema Ventura MD as PCP - General (Family Medicine)  Clay Roque MD as PCP - Empaneled Provider    Medical history Review  Past Medical, Family, and Social History reviewed and does not contribute to the patient presenting condition.     Health Maintenance   Topic Date Due    COVID-19 Vaccine (1) Never done    Shingles vaccine (1 of 2) Never done    Diabetic retinal exam  03/30/2016    Diabetic foot exam  09/18/2018    Flu vaccine (1) Never done    Diabetic Alb to Cr ratio (uACR) test  08/26/2022    Lipids  08/26/2022    GFR test (Diabetes, CKD 3-4, OR last GFR 15-59)  08/26/2022    Depression Screen  07/18/2023    Annual Wellness Visit (AWV)  07/19/2023    A1C test (Diabetic or Prediabetic)  09/01/2023    Colorectal Cancer Screen  08/25/2024    DTaP/Tdap/Td vaccine (2 - Td or Tdap) 06/16/2027    Pneumococcal 0-64 years Vaccine  Completed    Hepatitis C screen  Addressed    HIV screen  Addressed    Hepatitis A vaccine  Aged Out    Hib vaccine  Aged Out    Meningococcal (ACWY) vaccine  Aged Out

## 2023-02-20 NOTE — PATIENT INSTRUCTIONS
Thank you for letting us take care of you today. We hope all your questions were addressed. If a question was overlooked or something else comes to mind after you return home, please contact a member of your Care Team listed below. Your Care Team at [place] [address] is Team #2  Fernando Randall DO (Faculty)  Estephania Sultana (Faculty)  Ju Do MD (Resident)  Digna Duke MD (Resident)  Bethany Quesada MD (Resident)  Patrick Roque MD (Resident)  Shayan Valadez., MARIA C Panda.,  TATI Arias., JEFFYN  Felix Tabares., [facility] office)  Braeden Vaca, [facility] (Clinical Practice Manager)  Jann Joshi [facility] (Clinical Pharmacist)     Office phone number: [phone]    If you need to get in right away due to illness, please be advised we have \"Same Day\" appointments available Monday-Friday. Please call us at [phone] option #3 to schedule your \"Same Day\" appointment.

## 2023-02-20 NOTE — PROGRESS NOTES
Subjective:    Cielo Jackson is a 63 y.o. male with  has a past medical history of Arthritis, Cerebral artery occlusion with cerebral infarction [facility]), Chronic back pain, Elbow pain, chronic, Full dentures, Fungus infection, Hyperlipidemia, Hypertension, Thoracic aortic aneurysm diagnosed  2010 at [facility], and Type II or unspecified type diabetes mellitus without mention of complication, not stated as uncontrolled. Presented to the office today for:  Chief Complaint   Patient presents with    Diabetes    Health Maintenance     Pt stated had DM eye exam done last year on [address] uncertain of the name, pt declined flu vaccine        HPI    Patient is a 81-year-old male with history of CABG, hypertension, diabetes presented to the clinic today for routine follow-up. Patient's main concern today is chronic low back pain. Patient states he injured it 5 years ago and has had the pain ever since. Patient states over the past year has been getting worse. Patient states pain is located in the low lower back in the middle, pain is worse in the morning, patient describes pain as sharp pain with occasional sciatic pain. Patient has been taking NSAIDs with minimal relief. Patient is also having hip pain. Patient went to the ED on 09/30/2022 due to this pain, had x-rays done which was negative for any acute fracture, but showed degenerative changes. Patient is on aspirin and Plavix, patient states last CABG was years ago, he does not follow-up with a cardiologist.   Patient's HbA1c today was 10.8, went up from 6.9, patient is on metformin and glipizide. Patient states he does not check his sugar levels. Review of Systems   Constitutional:  Negative for chills and fever. Respiratory:  Negative for shortness of breath. Cardiovascular:  Negative for chest pain. Gastrointestinal:  Negative for abdominal pain, constipation, diarrhea, nausea and vomiting. Musculoskeletal:  Positive for back pain. Neurological:  Negative for dizziness, light-headedness and headaches. The patient has a   Family History   Problem Relation Age of Onset    High Blood Pressure Mother     Diabetes Mother     Cancer Father     Heart Disease Father     Stroke Brother     Cirrhosis Brother        Objective:    /77 (Site: Left Upper Arm, Position: Sitting, Cuff Size: Medium Adult)   Pulse 64   Temp 97.6 °F (36.4 °C) (Oral)   Ht 5' 8\" (1.727 m)   Wt 187 lb 6.4 oz (85 kg)   BMI 28.49 kg/m²    BP Readings from Last 3 Encounters:   02/20/23 118/77   12/30/22 (!) 169/85   12/05/22 (!) 156/86       Physical Exam  Constitutional:       Appearance: Normal appearance. HENT:      Head: Normocephalic and atraumatic. Cardiovascular:      Rate and Rhythm: Normal rate and regular rhythm. Pulses: Normal pulses. Heart sounds: Normal heart sounds. No murmur heard. No friction rub. No gallop. Pulmonary:      Effort: Pulmonary effort is normal. No respiratory distress. Breath sounds: Normal breath sounds. No stridor. No wheezing or rales. Abdominal:      General: Abdomen is flat. There is no distension. Palpations: Abdomen is soft. There is no mass. Tenderness: There is no abdominal tenderness. There is no guarding or rebound. Neurological:      Mental Status: He is alert.        Lab Results   Component Value Date    WBC 7.4 02/24/2018    HGB 11.5 (L) 02/24/2018    HCT 37.7 (L) 02/24/2018     02/24/2018    CHOL 112 08/26/2021    TRIG 137 08/26/2021    HDL 33 (L) 08/26/2021    ALT 10 08/26/2021    AST 10 08/26/2021     08/26/2021    K 4.4 08/26/2021     08/26/2021    CREATININE 1.07 08/26/2021    BUN 18 08/26/2021    CO2 24 08/26/2021    LABA1C 9.8 02/20/2023    LABMICR 693 (H) 08/26/2021     Lab Results   Component Value Date    CALCIUM 9.5 08/26/2021     Lab Results   Component Value Date    LDLCALC 120 07/24/2013    LDLCHOLESTEROL 52 08/26/2021       Assessment and Plan: 1. Screening for hyperlipidemia  -Continue Zocor 40 mg  - Lipid Panel; Future    2. Type 2 diabetes mellitus with hyperglycemia, without long-term current use of insulin (HCC)  -   - Microalbumin, Ur; Future  - POCT glycosylated hemoglobin (Hb A1C) 9.8, up from 6.9  -Continue metformin, glipizide, will add Jardiance, advised patient to check his sugar levels twice a day and counseled on lifestyle modifications, also counseled patient that if his numbers continue to increase he will need to be put on insulin. - Basic Metabolic Panel; Future  - empagliflozin (JARDIANCE) 10 MG tablet; Take 1 tablet by mouth daily  Dispense: 90 tablet; Refill: 1    3. Chronic midline low back pain with bilateral sciatica    - XR LUMBAR SPINE (2-3 VIEWS); Future      4. Hip pain    - XR HIP 3-4 VW W PELVIS BILATERAL; Future  - diclofenac sodium (VOLTAREN) 1 % GEL; Apply 4 g topically 4 times daily  Dispense: 50 g; Refill: 0    5. Hx of CABG  -Patient is taking dual antiplatelet therapy including aspirin Plavix, patient does not follow-up with cardiologist, patient has a history of CABG in the past, will refer to cardiology to see if patient needs to be on dual platelet antiplatelet therapy  - [facility] - Kvng Fox, , Cardiology, [employer] Prescriptions     Signed Prescriptions Disp Refills    diclofenac sodium (VOLTAREN) 1 % GEL 50 g 0     Sig: Apply 4 g topically 4 times daily    empagliflozin (JARDIANCE) 10 MG tablet 90 tablet 1     Sig: Take 1 tablet by mouth daily       Medications Discontinued During This Encounter   Medication Reason    INVOKANA 300 MG TABS tablet LIST CLEANUP    ketoconazole (NIZORAL) 2 % cream LIST CLEANUP    lidocaine (LIDODERM) 5 % LIST CLEANUP    terbinafine (ATHLETES FOOT, TERBINAFINE,) 1 % cream LIST 133 Searcy Raudel received counseling on the following healthy behaviors: nutrition, exercise and medication adherence    Discussed use,benefit, and side effects of prescribed medications. Barriers to medication compliance addressed. All patient questions answered. Pt voiced understanding. Return in about 4 weeks (around 3/20/2023) for back pain follow up. Disclaimer: Some orall of this note was transcribed using voice-recognition software. This may cause typographical errors occasionally. Although all effort is made to fix these errors, please do not hesitate to contact our office if there Carleen Nichols concern with the understanding of this note.

## 2023-02-23 NOTE — PROGRESS NOTES
Patient instructed to remove shoes and socks and instructed to sit in exam chair. Current PCP is Lora House MD and date of last visit was 02/20/2023. Do you have a follow up visit scheduled? Yes  If yes, the date is 04/07/2023      Diabetic visit information    Blood pressure (Control is BP <140/90)  BP Readings from Last 3 Encounters:   02/20/23 118/77   12/30/22 (!) 169/85   12/05/22 (!) 156/86       BP taken with correct size cuff? - Yes   Repeated if > 140/90 Yes      Tobacco use:  Patient  reports that he quit smoking about 24 years ago. His smoking use included cigarettes. He has a 2.50 pack-year smoking history. He has never used smokeless tobacco.  If Smoker - Cessation materials given? - Yes       Diabetic Health Maintenance Items due  Diabetes Management   Topic Date Due    Diabetic retinal exam  03/30/2016    Diabetic foot exam  09/18/2018    Diabetic Alb to Cr ratio (uACR) test  08/26/2022    Lipids  08/26/2022       Diabetic retinal exam done in last year? - Yes   If No: remind patient that it is due and they should schedule an exam    Medications  Is patient taking any medications for diabetes? -   Yes  Have blood sugars been controlled? Fasting blood sugars under 120   -   Yes   Random home sugars or today's POCT glucose is under 180 -   Yes   []  If No to the above then patient should schedule appt with PCP.      Diabetic Plan    A1C Plan  Lab Results   Component Value Date    LABA1C 9.8 02/20/2023    LABA1C 6.9 09/01/2022    LABA1C 6.5 05/12/2022      []  If A1C over 8 and last result >3 months ago - Order A1C and refer to PCP   []  If last A1C over 6 months ago - Order A1C and refer to PCP for follow up   []  If elevated blood sugars > 180 - refer to PCP for follow up    []  Blood sugar controlled - A1C under 8 and last check was < 6 months      Cholesterol Plan   Lab Results   Component Value Date    LDLCALC 120 07/24/2013    LDLCHOLESTEROL 52 08/26/2021      []  If LDL > 100 and last result >3 months ago - order Fasting lipids and refer to PCP for follow up   []  If LDL < 100 and over 1 year ago - Order Fasting lipids and refer to PCP for follow up   [] LDL is controlled. LDL < 100 and checked within the last year     Blood Pressure  BP Readings from Last 3 Encounters:   02/20/23 118/77   12/30/22 (!) 169/85   12/05/22 (!) 156/86      []  If SBP >140 mmhg - refer to PCP for follow up   []  If DBP > 90 mmhg - refer to PCP for follow up   [] BP is controlled <140/90     Order labs as PCP ordered.   (ie: Lipids, A1C, CMP)

## 2023-03-03 ENCOUNTER — TELEPHONE (OUTPATIENT)
Dept: FAMILY MEDICINE CLINIC | Age: 63
End: 2023-03-03

## 2023-03-03 NOTE — TELEPHONE ENCOUNTER
Harsha Rosenbaum from [facility] called in stating that the patient would benefit from statin therapy, requesting that the physician call back with information on accepting or declining authorization.     Please advise

## 2023-03-06 ENCOUNTER — HOSPITAL ENCOUNTER (OUTPATIENT)
Dept: GENERAL RADIOLOGY | Age: 63
Discharge: HOME | End: 2023-03-08
Payer: MEDICARE

## 2023-03-06 ENCOUNTER — HOSPITAL ENCOUNTER (OUTPATIENT)
Age: 63
Discharge: HOME | End: 2023-03-06
Payer: MEDICARE

## 2023-03-06 ENCOUNTER — HOSPITAL ENCOUNTER (OUTPATIENT)
Age: 63
Discharge: HOME | End: 2023-03-08
Payer: MEDICARE

## 2023-03-06 ENCOUNTER — OFFICE VISIT (OUTPATIENT)
Dept: PODIATRY | Age: 63
End: 2023-03-06
Payer: MEDICARE

## 2023-03-06 VITALS
HEIGHT: 68 IN | DIASTOLIC BLOOD PRESSURE: 82 MMHG | WEIGHT: 187 LBS | BODY MASS INDEX: 28.34 KG/M2 | SYSTOLIC BLOOD PRESSURE: 149 MMHG

## 2023-03-06 DIAGNOSIS — M79.671 PAIN IN RIGHT FOOT: ICD-10-CM

## 2023-03-06 DIAGNOSIS — G89.29 CHRONIC MIDLINE LOW BACK PAIN WITH BILATERAL SCIATICA: ICD-10-CM

## 2023-03-06 DIAGNOSIS — I73.9 PERIPHERAL VASCULAR DISORDER (HCC): ICD-10-CM

## 2023-03-06 DIAGNOSIS — M54.41 CHRONIC MIDLINE LOW BACK PAIN WITH BILATERAL SCIATICA: ICD-10-CM

## 2023-03-06 DIAGNOSIS — B35.1 ONYCHOMYCOSIS: Primary | ICD-10-CM

## 2023-03-06 DIAGNOSIS — M20.5X2 HALLUX LIMITUS OF LEFT FOOT: ICD-10-CM

## 2023-03-06 DIAGNOSIS — M21.612 BUNION OF GREAT TOE OF LEFT FOOT: ICD-10-CM

## 2023-03-06 DIAGNOSIS — M25.559 HIP PAIN: ICD-10-CM

## 2023-03-06 DIAGNOSIS — M79.672 LEFT FOOT PAIN: ICD-10-CM

## 2023-03-06 DIAGNOSIS — Z13.220 SCREENING FOR HYPERLIPIDEMIA: ICD-10-CM

## 2023-03-06 DIAGNOSIS — E11.65 TYPE 2 DIABETES MELLITUS WITH HYPERGLYCEMIA, WITHOUT LONG-TERM CURRENT USE OF INSULIN (HCC): ICD-10-CM

## 2023-03-06 DIAGNOSIS — M54.42 CHRONIC MIDLINE LOW BACK PAIN WITH BILATERAL SCIATICA: ICD-10-CM

## 2023-03-06 LAB
ANION GAP SERPL CALCULATED.3IONS-SCNC: 12 MMOL/L (ref 9–17)
BUN SERPL-MCNC: 20 MG/DL (ref 8–23)
CALCIUM SERPL-MCNC: 9.3 MG/DL (ref 8.6–10.4)
CHLORIDE SERPL-SCNC: 102 MMOL/L (ref 98–107)
CHOLEST SERPL-MCNC: 231 MG/DL
CHOLESTEROL/HDL RATIO: 5.1
CO2 SERPL-SCNC: 26 MMOL/L (ref 20–31)
CREAT SERPL-MCNC: 1.28 MG/DL (ref 0.7–1.2)
CREATININE URINE: 125.3 MG/DL (ref 39–259)
GFR SERPL CREATININE-BSD FRML MDRD: >60 ML/MIN/1.73M2
GLUCOSE SERPL-MCNC: 251 MG/DL (ref 70–99)
HDLC SERPL-MCNC: 45 MG/DL
LDLC SERPL CALC-MCNC: 130 MG/DL (ref 0–130)
MICROALBUMIN/CREAT 24H UR: 1203 MG/L
MICROALBUMIN/CREAT UR-RTO: 960 MCG/MG CREAT
POTASSIUM SERPL-SCNC: 4.1 MMOL/L (ref 3.7–5.3)
SODIUM SERPL-SCNC: 140 MMOL/L (ref 135–144)
TRIGL SERPL-MCNC: 279 MG/DL

## 2023-03-06 PROCEDURE — G8428 CUR MEDS NOT DOCUMENT: HCPCS | Performed by: PODIATRIST

## 2023-03-06 PROCEDURE — G8484 FLU IMMUNIZE NO ADMIN: HCPCS | Performed by: PODIATRIST

## 2023-03-06 PROCEDURE — 99213 OFFICE O/P EST LOW 20 MIN: CPT | Performed by: PODIATRIST

## 2023-03-06 PROCEDURE — 72100 X-RAY EXAM L-S SPINE 2/3 VWS: CPT

## 2023-03-06 PROCEDURE — 36415 COLL VENOUS BLD VENIPUNCTURE: CPT

## 2023-03-06 PROCEDURE — 80061 LIPID PANEL: CPT

## 2023-03-06 PROCEDURE — G8417 CALC BMI ABV UP PARAM F/U: HCPCS | Performed by: PODIATRIST

## 2023-03-06 PROCEDURE — 82570 ASSAY OF URINE CREATININE: CPT

## 2023-03-06 PROCEDURE — 82043 UR ALBUMIN QUANTITATIVE: CPT

## 2023-03-06 PROCEDURE — 80048 BASIC METABOLIC PNL TOTAL CA: CPT

## 2023-03-06 PROCEDURE — 11721 DEBRIDE NAIL 6 OR MORE: CPT | Performed by: PODIATRIST

## 2023-03-06 PROCEDURE — 73522 X-RAY EXAM HIPS BI 3-4 VIEWS: CPT

## 2023-03-06 PROCEDURE — 73630 X-RAY EXAM OF FOOT: CPT

## 2023-03-06 PROCEDURE — 3017F COLORECTAL CA SCREEN DOC REV: CPT | Performed by: PODIATRIST

## 2023-03-06 PROCEDURE — 3074F SYST BP LT 130 MM HG: CPT | Performed by: PODIATRIST

## 2023-03-06 PROCEDURE — 1036F TOBACCO NON-USER: CPT | Performed by: PODIATRIST

## 2023-03-06 PROCEDURE — 3078F DIAST BP <80 MM HG: CPT | Performed by: PODIATRIST

## 2023-03-06 NOTE — PROGRESS NOTES
[address]  [address] [place] [address] [address] [facility], [address] Redd Muñiz  Tel: [phone]   Fax: [phone]    Subjective     CC: Left foot pain    Interval History:   Pt returns to clinic today for diabetic foot care. He states he would like to discuss a bunion surgery. His last xrays were a year ago. He states he also saw Dr. Bonita Del Rio and was told he has non compressible vessels. He states he has left foot pain at the 1st MPJ. He states he has orthotics and has not used them often. He also would like to know if any shoes can help him. The patient states he would also like to have his nails debrided today. HPI:  Dianna Polk is a 63y.o. year old male who returns to clinic today for follow up of left 1st MPJ pain. The patient explains the pain and swelling has reduced since his last visit. Describes that sometimes his big toe joint gets red, painful, and swollen and it has been occurring off and on for some time. He did forget to obtain the foot xrays previously ordered. Denies known history of gout. No new concerns today. Primary care physician is Ovidio Monahan MD.    ROS:    Constitutional: Denies nausea, vomiting, fever, chills. Neurologic: Denies numbness, tingling, and burning in the feet. Vascular: Denies symptoms of lower extremity claudication. Skin: Denies open wounds. Otherwise negative except as noted in the HPI. Objective     Vitals:    03/06/23 1217   BP: (!) 149/82       Lab Results   Component Value Date    LABA1C 9.8 02/20/2023       No results found for: SEDRATE    Physical Exam:  General:  Alert and oriented x3. In no acute distress. Lower Extremity Physical Exam:    Vascular: DP and PT pulses are non palpable but audible on doppler. CFT <3 seconds to all digits. No pedal edema. Hair growth is absent to the level of all pedal digits. Neuro: Saph/sural/SP/DP/plantar sensation present to light touch.  Protective sensation is intact to all sites as tested with a 5.07g SWMF to both feet. Musculoskeletal: EHL/FHL/GS/TA gross motor intact to the right and left LE. Pain & limited ROM of left 1st MPJ. Hammertoes 2-3, b/l. HAV b/l    Dermatologic: No open lesions, Bilateral.  Interdigital maceration absent, Bilateral.  Nails 1-10 are thickened, yellow, with subungual debris. Skin is shiny and atrophic. Imaging:   No orders to display       Assessment   Luis Miguel Jordan is a 63 y.o. male with     Diagnosis Orders   1. Onychomycosis        2. Bunion of great toe of left foot  XR FOOT LEFT (MIN 3 VIEWS)      3. Peripheral vascular disorder (HCC)        4. Hallux limitus of left foot        5. Left foot pain        6. Pain in right foot            Plan   Patient examined and evaluated  Xrays reviewed; mild degenerative changes seen in the 1st MPJ b/l. Vascular -  No arterial  but does have tibial arterial disease  PVRs results - Left and Right ADAN = 1.52  Nails 1-10 debrided WOI with sterile nippers. Patient to RTC in 3 months with New xrays  Discussed the use of new shoes gear like new balance and use of orthotics  At this time we will approach this conservatively  Given the patients non compressible blood vessels; performing a left bunion surgery is not beneficial in terms of healing. We will discuss with the patient if surgery is beneficial or will pose more risk post operatively after reviewing new xrays  Left xrays ordered  Discussed with Dr. Anitra Mcdonald  Please, call the office with any questions or concerns     No orders of the defined types were placed in this encounter.     Orders Placed This Encounter   Procedures    XR FOOT LEFT (MIN 3 VIEWS)     Please perform weight bearing Xrays    Thank you     Standing Status:   Future     Number of Occurrences:   1     Standing Expiration Date:   3/6/2024         Jessenia Lam DPM   Podiatric Medicine & Surgery   3/7/2023 at 1:42 PM

## 2023-04-07 ENCOUNTER — OFFICE VISIT (OUTPATIENT)
Dept: FAMILY MEDICINE CLINIC | Age: 63
End: 2023-04-07
Payer: MEDICARE

## 2023-04-07 VITALS
DIASTOLIC BLOOD PRESSURE: 80 MMHG | TEMPERATURE: 97.2 F | SYSTOLIC BLOOD PRESSURE: 150 MMHG | WEIGHT: 193 LBS | HEART RATE: 65 BPM | BODY MASS INDEX: 29.35 KG/M2

## 2023-04-07 DIAGNOSIS — E11.65 TYPE 2 DIABETES MELLITUS WITH HYPERGLYCEMIA, WITHOUT LONG-TERM CURRENT USE OF INSULIN (HCC): Primary | ICD-10-CM

## 2023-04-07 PROCEDURE — 99211 OFF/OP EST MAY X REQ PHY/QHP: CPT

## 2023-04-07 ASSESSMENT — ENCOUNTER SYMPTOMS
ABDOMINAL PAIN: 0
NAUSEA: 0
CONSTIPATION: 0
VOMITING: 0
DIARRHEA: 0
SHORTNESS OF BREATH: 0

## 2023-04-07 NOTE — PROGRESS NOTES
Visit Information    Have you changed or started any medications since your last visit including any over-the-counter medicines, vitamins, or herbal medicines? no   Have you stopped taking any of your medications? Is so, why? -  no  Are you having any side effects from any of your medications? - no    Have you seen any other physician or provider since your last visit?  no   Have you had any other diagnostic tests since your last visit? yes xray  Have you been seen in the emergency room and/or had an admission in a hospital since we last saw you?  yes - [facility]   Have you had your routine dental cleaning in the past 6 months?  no     Do you have an active MyChart account? If no, what is the barrier?   No:     Patient Care Team:  Simone Johnson MD as PCP - General (Family Medicine)  Josue Johnson MD as PCP - Empaneled Provider    Medical History Review  Past Medical, Family, and Social History reviewed and does not contribute to the patient presenting condition    Health Maintenance   Topic Date Due    COVID-19 Vaccine (1) Never done    Shingles vaccine (1 of 2) Never done    Diabetic retinal exam  03/30/2016    Diabetic foot exam  09/18/2018    A1C test (Diabetic or Prediabetic)  05/20/2023    Annual Wellness Visit (AWV)  07/19/2023    Flu vaccine (Season Ended) 08/01/2023    Depression Screen  02/20/2024    Diabetic Alb to Cr ratio (uACR) test  03/06/2024    Lipids  03/06/2024    GFR test (Diabetes, CKD 3-4, OR last GFR 15-59)  03/06/2024    Colorectal Cancer Screen  08/25/2024    DTaP/Tdap/Td vaccine (2 - Td or Tdap) 06/16/2027    Pneumococcal 0-64 years Vaccine  Completed    Hepatitis C screen  Addressed    HIV screen  Addressed    Hepatitis A vaccine  Aged Out    Hib vaccine  Aged Out    Meningococcal (ACWY) vaccine  Aged Out

## 2023-04-07 NOTE — PROGRESS NOTES
Attending Physician Statement  I have discussed the care of Enriqueta Nelson, including pertinent history and exam findings,  with the resident. I have reviewed the key elements of all parts of the encounter with the resident. I agree with the assessment, plan and orders as documented by the resident.   (Tess Hines)    Seven Biggs MD

## 2023-04-07 NOTE — PROGRESS NOTES
Subjective:    Douglas Wagoner is a 63 y.o. male with  has a past medical history of Arthritis, Cerebral artery occlusion with cerebral infarction [facility]), Chronic back pain, Elbow pain, chronic, Full dentures, Fungus infection, Hyperlipidemia, Hypertension, Thoracic aortic aneurysm diagnosed  2010 at [facility], and Type II or unspecified type diabetes mellitus without mention of complication, not stated as uncontrolled. Presented to the office today for:  Chief Complaint   Patient presents with    Follow-up     Patient is following up on his dm, bp        HPI    Patient is a 82-year-old male with history of hypertension, diabetes presented to the clinic today to follow-up on his diabetes. Patient's last hemoglobin A1c in February was 28, which was up from 6.9 from the reading before. Patient states he has cut all sugar and sodas from his diet. Patient is on glipizide and metformin for his diabetes. Patient states when he checks his sugar levels it normally is in the 120s 140s. Denies any high readings. Patient overall feels better. Patient's blood pressure today is 25/87. Patient is on Norvasc 10 mg, hydrochlorothiazide, hydralazine 10 mg 3 times daily. Patient denies any chest pain, shortness of breath, abdominal pain, nausea or vomiting. Review of Systems   Constitutional:  Negative for chills and fever. Respiratory:  Negative for shortness of breath. Cardiovascular:  Negative for chest pain. Gastrointestinal:  Negative for abdominal pain, constipation, diarrhea, nausea and vomiting. Neurological:  Negative for dizziness, light-headedness and headaches.                The patient has a   Family History   Problem Relation Age of Onset    High Blood Pressure Mother     Diabetes Mother     Cancer Father     Heart Disease Father     Stroke Brother     Cirrhosis Brother        Objective:    BP (!) 155/87 (Site: Left Upper Arm, Position: Sitting, Cuff Size: Medium Adult)   Pulse 65   Temp

## 2023-04-07 NOTE — PATIENT INSTRUCTIONS
Thank you for letting us take care of you today. We hope all your questions were addressed. If a question was overlooked or something else comes to mind after you return home, please contact a member of your Care Team listed below. Your Care Team at [place] [address] is Team #2  Fredy Michel DO (Faculty)  Sav Cain (Faculty)  Taylor Dolan MD (Resident)  Kendrick Fernandez MD (Resident)  Marielos Schumacher MD (Resident)  Rito Cordero MD (Resident)  Veronica Smith MD (Resident)  SHIVA Hoang. ,LEAH ACEVEDO [facility] ([address] [facility] office)  Rosalia Yen, [address] (Clinical Practice Manager)  Shreya Jane, [facility] (Clinical Pharmacist)     Office phone number: [phone]    If you need to get in right away due to illness, please be advised we have \"Same Day\" appointments available Monday-Friday. Please call us at [phone] option #3 to schedule your \"Same Day\" appointment.

## 2023-05-09 ENCOUNTER — OFFICE VISIT (OUTPATIENT)
Dept: FAMILY MEDICINE CLINIC | Age: 63
End: 2023-05-09
Payer: MEDICARE

## 2023-05-09 VITALS
TEMPERATURE: 98.3 F | DIASTOLIC BLOOD PRESSURE: 82 MMHG | SYSTOLIC BLOOD PRESSURE: 152 MMHG | HEART RATE: 70 BPM | BODY MASS INDEX: 28.74 KG/M2 | WEIGHT: 189 LBS

## 2023-05-09 DIAGNOSIS — M25.511 ACUTE PAIN OF RIGHT SHOULDER: ICD-10-CM

## 2023-05-09 DIAGNOSIS — M54.50 CHRONIC MIDLINE LOW BACK PAIN WITHOUT SCIATICA: ICD-10-CM

## 2023-05-09 DIAGNOSIS — G89.29 CHRONIC MIDLINE LOW BACK PAIN WITHOUT SCIATICA: ICD-10-CM

## 2023-05-09 DIAGNOSIS — E11.65 TYPE 2 DIABETES MELLITUS WITH HYPERGLYCEMIA, WITHOUT LONG-TERM CURRENT USE OF INSULIN (HCC): Primary | ICD-10-CM

## 2023-05-09 LAB — HBA1C MFR BLD: 7.1 %

## 2023-05-09 PROCEDURE — 99211 OFF/OP EST MAY X REQ PHY/QHP: CPT

## 2023-05-09 PROCEDURE — G8427 DOCREV CUR MEDS BY ELIG CLIN: HCPCS

## 2023-05-09 PROCEDURE — 3079F DIAST BP 80-89 MM HG: CPT

## 2023-05-09 PROCEDURE — 3017F COLORECTAL CA SCREEN DOC REV: CPT

## 2023-05-09 PROCEDURE — 99213 OFFICE O/P EST LOW 20 MIN: CPT

## 2023-05-09 PROCEDURE — 83036 HEMOGLOBIN GLYCOSYLATED A1C: CPT

## 2023-05-09 PROCEDURE — 3077F SYST BP >= 140 MM HG: CPT

## 2023-05-09 PROCEDURE — 1036F TOBACCO NON-USER: CPT

## 2023-05-09 PROCEDURE — G8417 CALC BMI ABV UP PARAM F/U: HCPCS

## 2023-05-09 PROCEDURE — 2022F DILAT RTA XM EVC RTNOPTHY: CPT

## 2023-05-09 PROCEDURE — 3051F HG A1C>EQUAL 7.0%<8.0%: CPT

## 2023-05-09 RX ORDER — ACETAMINOPHEN 500 MG
500 TABLET ORAL 4 TIMES DAILY PRN
Qty: 120 TABLET | Refills: 0 | Status: SHIPPED | OUTPATIENT
Start: 2023-05-09

## 2023-05-09 ASSESSMENT — ENCOUNTER SYMPTOMS
SHORTNESS OF BREATH: 0
VOMITING: 0
NAUSEA: 0
ABDOMINAL PAIN: 0
BACK PAIN: 1
DIARRHEA: 0
CONSTIPATION: 0

## 2023-05-09 NOTE — PATIENT INSTRUCTIONS
Thank you for letting us take care of you today. We hope all your questions were addressed. If a question was overlooked or something else comes to mind after you return home, please contact a member of your Care Team listed below. Your Care Team at [place] [address] is Team #2  Elvis Brower DO (Faculty)  Lin Inman (Faculty)  Thong Marshall MD (Resident)  Noah Osman MD (Resident)  Latosha Elizabeth MD (Resident)  Jo Mahoney MD (Resident)  Sandy Marie MD (Resident)  SHIVA Hinds. ,[facility] ([address] [facility] office)  Terri Sheehan, [address] (Clinical Practice Manager)  Alyce Diaz, [facility] (Clinical Pharmacist)     Office phone number: [phone]    If you need to get in right away due to illness, please be advised we have \"Same Day\" appointments available Monday-Friday. Please call us at [phone] option #3 to schedule your \"Same Day\" appointment.

## 2023-05-09 NOTE — PROGRESS NOTES
Diabetic visit information    BP Readings from Last 3 Encounters:   04/07/23 (!) 150/80   03/06/23 (!) 149/82   02/20/23 118/77       Hemoglobin A1C (%)   Date Value   02/20/2023 9.8   09/01/2022 6.9   05/12/2022 6.5     Microalb/Crt. Ratio (mcg/mg creat)   Date Value   03/06/2023 960 (H)     LDL Cholesterol (mg/dL)   Date Value   03/06/2023 130     LDL Calculated (mg/dL)   Date Value   07/24/2013 120               Have you changed or started any medications since your last visit including any over-the-counter medicines, vitamins, or herbal medicines? no   Have you stopped taking any of your medications? Is so, why? -  no  Are you having any side effects from any of your medications? - no    Have you seen any other physician or provider since your last visit?  no   Have you had any other diagnostic tests since your last visit?  no   Have you been seen in the emergency room and/or had an admission in a hospital since we last saw you?  no     Have you had your annual diabetic retinal (eye) exam? No   (ensure copy of exam is in the chart)    Have you had your routine dental cleaning in the past 6 months? no    Do you have an active [employer] account? If not, what are your barriers? No:     Patient Care Team:  Mary Marshall MD as PCP - General (Family Medicine)  Bony Capone MD as PCP - Empaneled Provider    Medical history Review  Past Medical, Family, and Social History reviewed and does not contribute to the patient presenting condition.     Health Maintenance   Topic Date Due    COVID-19 Vaccine (1) Never done    Shingles vaccine (1 of 2) Never done    Diabetic retinal exam  03/30/2016    Diabetic foot exam  09/18/2018    A1C test (Diabetic or Prediabetic)  05/20/2023    Flu vaccine (Season Ended) 08/01/2023    Depression Screen  02/20/2024    Diabetic Alb to Cr ratio (uACR) test  03/06/2024    Lipids  03/06/2024    GFR test (Diabetes, CKD 3-4, OR last GFR 15-59)  03/06/2024    Colorectal Cancer Screen

## 2023-05-09 NOTE — PROGRESS NOTES
Subjective:    Kanwal Hawthorne is a 66 y.o. male with  has a past medical history of Arthritis, Cerebral artery occlusion with cerebral infarction [facility]), Chronic back pain, Elbow pain, chronic, Full dentures, Fungus infection, Hyperlipidemia, Hypertension, Thoracic aortic aneurysm diagnosed  2010 at [facility], and Type II or unspecified type diabetes mellitus without mention of complication, not stated as uncontrolled. Presented to the office today for:  Chief Complaint   Patient presents with    Diabetes     Patient is following up on his a1c    Back Pain     Patient states he been having lower back pain 8/10       Diabetes  Pertinent negatives for hypoglycemia include no dizziness or headaches. Pertinent negatives for diabetes include no chest pain. Back Pain  Pertinent negatives include no abdominal pain, chest pain, fever, headaches or numbness. Patient is a 21-year-old male with history of diabetes, hypertension presented to the clinic today for diabetes follow-up. Patient was last seen in clinic in April, February patient's A1c was 9.8, repeat today was 7.1. Patient states when he checks his sugar levels the maximum number he sees is 140. Patient has been compliant with diet and exercise. Patient also states he has been having chronic low back pain. X-ray of the lumbar spine done on 02/20/2023 showed degenerative disc disease and spondylosis at L5 to S1. Patient has been taking ibuprofen for the pain. Patient has no red flags. No other concerns at this time. Review of Systems   Constitutional:  Negative for chills and fever. Respiratory:  Negative for shortness of breath. Cardiovascular:  Negative for chest pain. Gastrointestinal:  Negative for abdominal pain, constipation, diarrhea, nausea and vomiting. Genitourinary:  Negative for difficulty urinating and flank pain. Musculoskeletal:  Positive for back pain.    Neurological:  Negative for dizziness, light-headedness,

## 2023-05-09 NOTE — PROGRESS NOTES
Attending Physician Statement  I have discussed the care of Amanda Hogan, including pertinent history and exam findings,  with the resident. I have reviewed the key elements of all parts of the encounter with the resident. I agree with the assessment, plan and orders as documented by the resident.   (Fadumo Disla)    Stephanie Jarvis MD

## 2023-05-10 NOTE — TELEPHONE ENCOUNTER
E-scribe request for FLEXERIL 10 MG TAB. Please review and e-scribe if applicable. Last Visit Date:  5/9/2023  Next Visit Date:  5/9/2023    Hemoglobin A1C (%)   Date Value   05/09/2023 7.1   02/20/2023 9.8   09/01/2022 6.9             ( goal A1C is < 7)   Microalb/Crt. Ratio (mcg/mg creat)   Date Value   03/06/2023 960 (H)     LDL Cholesterol (mg/dL)   Date Value   03/06/2023 130     LDL Calculated (mg/dL)   Date Value   07/24/2013 120       (goal LDL is <100)   AST (U/L)   Date Value   08/26/2021 10     ALT (U/L)   Date Value   08/26/2021 10     BUN (mg/dL)   Date Value   03/06/2023 20     BP Readings from Last 3 Encounters:   05/09/23 (!) 152/82   04/07/23 (!) 150/80   03/06/23 (!) 149/82          (goal 120/80)        Patient Active Problem List:     HLD (hyperlipidemia)     Essential hypertension     DM (diabetes mellitus) ([address]     Chronic back pain     Thoracic aortic aneurysm diagnosed  2010?  at [facility]     Acute pain of right shoulder     Other male erectile dysfunction     Poorly controlled diabetes mellitus ([address]     Long term (current) use of antithrombotics/antiplatelets     Chronic bilateral low back pain without sciatica     Atheroscler of native artery of both legs with intermit claudication ([address]      ----JF

## 2023-05-10 NOTE — TELEPHONE ENCOUNTER
E-scribe request for METOPROLOL 100 MG. Please review and e-scribe if applicable. Last Visit Date:  5/9/2023  Next Visit Date:  5/9/2023    Hemoglobin A1C (%)   Date Value   05/09/2023 7.1   02/20/2023 9.8   09/01/2022 6.9             ( goal A1C is < 7)   Microalb/Crt. Ratio (mcg/mg creat)   Date Value   03/06/2023 960 (H)     LDL Cholesterol (mg/dL)   Date Value   03/06/2023 130     LDL Calculated (mg/dL)   Date Value   07/24/2013 120       (goal LDL is <100)   AST (U/L)   Date Value   08/26/2021 10     ALT (U/L)   Date Value   08/26/2021 10     BUN (mg/dL)   Date Value   03/06/2023 20     BP Readings from Last 3 Encounters:   05/09/23 (!) 152/82   04/07/23 (!) 150/80   03/06/23 (!) 149/82          (goal 120/80)        Patient Active Problem List:     HLD (hyperlipidemia)     Essential hypertension     DM (diabetes mellitus) ([address]     Chronic back pain     Thoracic aortic aneurysm diagnosed  2010?  at [facility]     Acute pain of right shoulder     Other male erectile dysfunction     Poorly controlled diabetes mellitus ([address]     Long term (current) use of antithrombotics/antiplatelets     Chronic bilateral low back pain without sciatica     Atheroscler of native artery of both legs with intermit claudication ([address]      ----JF

## 2023-05-11 RX ORDER — IBUPROFEN 800 MG/1
TABLET ORAL
Qty: 30 TABLET | Refills: 0 | Status: SHIPPED | OUTPATIENT
Start: 2023-05-11 | End: 2023-06-12

## 2023-05-11 RX ORDER — METOPROLOL TARTRATE 100 MG/1
TABLET ORAL
Qty: 60 TABLET | Refills: 10 | Status: SHIPPED | OUTPATIENT
Start: 2023-05-11

## 2023-05-11 RX ORDER — CYCLOBENZAPRINE HCL 10 MG
TABLET ORAL
Qty: 10 TABLET | Refills: 0 | Status: SHIPPED | OUTPATIENT
Start: 2023-05-11

## 2023-05-23 ENCOUNTER — HOSPITAL ENCOUNTER (OUTPATIENT)
Dept: PHYSICAL THERAPY | Age: 63
Setting detail: THERAPIES SERIES
Discharge: HOME | End: 2023-05-23
Payer: MEDICARE

## 2023-05-23 PROCEDURE — 97140 MANUAL THERAPY 1/> REGIONS: CPT

## 2023-05-23 PROCEDURE — 97161 PT EVAL LOW COMPLEX 20 MIN: CPT

## 2023-05-24 NOTE — FLOWSHEET NOTE
Caryl Fall Risk Assessment    Patient Name:  Tiffany Westbrook  : 1960    Risk Factor Scale  Score   History of Falls [x] Yes  [] No 25  0 25   Secondary Diagnosis [] Yes  [x] No 15  0 0   Ambulatory Aid [] Furniture  [] Crutches/cane/walker  [x] None/bedrest/wheelchair/nurse 30  15  0 0   IV/Heparin Lock [] Yes  [x] No 20  0 0   Gait/Transferring [] Impaired  [] Weak  [x] Normal/bedrest/immobile 20  10  0 0   Mental Status [] Forgets limitations  [x] Oriented to own ability 15  0 0      Total: 25     Based on the Assessment score: check the appropriate box.     []  No intervention needed   Low =   Score of 0-24    [x]  Use standard prevention interventions Moderate =  Score of 24-44   [x] Give patient handout and discuss fall prevention strategies   [x] Establish goal of education for patient/family RE: fall prevention strategies    []  Use high risk prevention interventions High = Score of 45 and higher   [] Give patient handout and discuss fall prevention strategies   [] Establish goal of education for patient/family Re: fall prevention strategies   [] Discuss lifeline / other resources    Electronically signed by:   Rohit Parnell, PT  Date: 2023

## 2023-05-31 ENCOUNTER — HOSPITAL ENCOUNTER (OUTPATIENT)
Dept: PHYSICAL THERAPY | Age: 63
Setting detail: THERAPIES SERIES
Discharge: HOME | End: 2023-05-31
Payer: MEDICARE

## 2023-05-31 NOTE — FLOWSHEET NOTE
[x] [facility] &  Therapy  [address]  [phone]  F: [phone] [] [address]  [address]   [address]  P: [phone]  F: [phone] [] [address]  [address]  P: [phone]  F: [phone] [] [address]  P: [phone]  F: [phone] [] [address]  [facility]   [address]   [place]: [phone]  F: [phone]      Physical Therapy Daily Treatment Note    Date:  2023  Patient Name:  Yasmeen Armstrong    :  1960  MRN: [id number]  Insurance: [place] Medicare, [place] Medicaid. No auth needed. Dual coverage  Medical Diagnosis: Chronic midline low back pain without sciatica (M54.50,G89.29 [ICD-10-CM])                Rehab Codes: M 54.59, M 62.81, R 26.89, M 62.830, Z 91.81, M 25.552, M 25.551  Onset Date: 3/17/2023                        Next 's appt.: Not scheduled    Visit# / total visits: ; Progress note for Medicare patient due at visit 10     Cancels/No Shows: 0/0    Subjective:    Location: Low back pain           Pain Rating: (0-10 scale) 10/10, left hip pain 10/10, right hip 6-7/10  Pain altered Tx:  [] Yes  [x] No  Action:  Comments:    Objective:  Modalities:   Precautions:  Exercises:  Exercise Reps/ Time Weight/ Level Comments                                                                                                                           Other:      Treatment Charges: Mins Units   []  Modalities     []  Ther Exercise     []  Manual Therapy     []  Ther Activities     []  Neuro Re-ed     []  Vasocompression     [] Gait     []  Other     Total Treatment time ***        Assessment: [x] Progressing toward goals. [] No change.      [] Other:  [x] Patient would continue to

## 2023-05-31 NOTE — FLOWSHEET NOTE
[x] [facility] ([facility]) [facility] &  Therapy  [address]    [phone]  F: [phone]   [] [address]  [facility]   [address]  P: [phone]  F: [phone]  [] [address] &  Therapy  [address]  P: [phone]  F: [phone] [] [address]  P: [phone]  F: [phone]  [] [address] [facility]   [address]   [place]: [phone]  F: [phone]   [] [address]  [place]: [phone]   F: [phone]     Physical Therapy Cancel/No Show note    Date: 2023  Patient: Jerod Camargo  : 1960  MRN: [id number]    Cancels/No Shows to date:     For today's appointment patient:    [x]  Cancelled    [] Rescheduled appointment    [] No-show     Reason given by patient:    []  Patient ill    []  Conflicting appointment    [] No transportation      [] Conflict with work    [x] No reason given    [] Weather related    [] [id number]    [] Other:      Comments:        [x] Next appointment was confirmed    Electronically signed by: Stuart Veliz PTA

## 2023-06-02 ENCOUNTER — HOSPITAL ENCOUNTER (OUTPATIENT)
Dept: PHYSICAL THERAPY | Age: 63
Setting detail: THERAPIES SERIES
Discharge: HOME | End: 2023-06-02

## 2023-06-08 DIAGNOSIS — M25.511 ACUTE PAIN OF RIGHT SHOULDER: ICD-10-CM

## 2023-06-09 RX ORDER — LISINOPRIL 40 MG/1
TABLET ORAL
Qty: 30 TABLET | Refills: 10 | Status: SHIPPED | OUTPATIENT
Start: 2023-06-09

## 2023-06-09 NOTE — TELEPHONE ENCOUNTER
04/21/2023               Patient Active Problem List:     HLD (hyperlipidemia)     Essential hypertension     DM (diabetes mellitus) ([address])     Chronic back pain     Thoracic aortic aneurysm diagnosed  2010?  at [facility]     Acute pain of right shoulder     Other male erectile dysfunction     Poorly controlled diabetes mellitus ([address])     Long term (current) use of antithrombotics/antiplatelets     Chronic bilateral low back pain without sciatica     Atheroscler of native artery of both legs with intermit claudication ([address])

## 2023-06-09 NOTE — TELEPHONE ENCOUNTER
04/21/2023               Patient Active Problem List:     HLD (hyperlipidemia)     Essential hypertension     DM (diabetes mellitus) ([facility]     Chronic back pain     Thoracic aortic aneurysm diagnosed  2010?  at [facility]     Acute pain of right shoulder     Other male erectile dysfunction     Poorly controlled diabetes mellitus ([facility]     Long term (current) use of antithrombotics/antiplatelets     Chronic bilateral low back pain without sciatica     Atheroscler of native artery of both legs with intermit claudication

## 2023-06-13 PROBLEM — M62.08 DIASTASIS RECTI: Status: ACTIVE | Noted: 2023-06-13

## 2023-06-13 PROBLEM — S93.513A: Status: ACTIVE | Noted: 2023-06-13

## 2023-06-13 RX ORDER — IBUPROFEN 800 MG/1
TABLET ORAL
Qty: 30 TABLET | Refills: 10 | OUTPATIENT
Start: 2023-06-13

## 2023-06-13 RX ORDER — CYCLOBENZAPRINE HCL 10 MG
TABLET ORAL
Qty: 10 TABLET | Refills: 10 | OUTPATIENT
Start: 2023-06-13

## 2023-06-14 DIAGNOSIS — M25.511 ACUTE PAIN OF RIGHT SHOULDER: ICD-10-CM

## 2023-06-15 RX ORDER — CYCLOBENZAPRINE HCL 10 MG
TABLET ORAL
Qty: 10 TABLET | Refills: 10 | OUTPATIENT
Start: 2023-06-15

## 2023-06-15 RX ORDER — GLIPIZIDE 10 MG/1
TABLET, FILM COATED, EXTENDED RELEASE ORAL
Qty: 60 TABLET | Refills: 10 | OUTPATIENT
Start: 2023-06-15

## 2023-06-15 RX ORDER — IBUPROFEN 800 MG/1
TABLET ORAL
Qty: 30 TABLET | Refills: 10 | OUTPATIENT
Start: 2023-06-15

## 2023-06-19 ENCOUNTER — OFFICE VISIT (OUTPATIENT)
Dept: PODIATRY | Age: 63
End: 2023-06-19
Payer: MEDICARE

## 2023-06-19 VITALS
BODY MASS INDEX: 27.43 KG/M2 | SYSTOLIC BLOOD PRESSURE: 124 MMHG | WEIGHT: 181 LBS | HEIGHT: 68 IN | HEART RATE: 71 BPM | DIASTOLIC BLOOD PRESSURE: 74 MMHG

## 2023-06-19 DIAGNOSIS — M79.672 LEFT FOOT PAIN: ICD-10-CM

## 2023-06-19 DIAGNOSIS — S93.513A SPRAIN OF COLLATERAL LIGAMENT OF INTERPHALANGEAL JOINT OF GREAT TOE: Primary | ICD-10-CM

## 2023-06-19 DIAGNOSIS — M20.42 HAMMER TOE OF LEFT FOOT: ICD-10-CM

## 2023-06-19 DIAGNOSIS — M20.41 HAMMER TOE OF RIGHT FOOT: ICD-10-CM

## 2023-06-19 PROCEDURE — 99213 OFFICE O/P EST LOW 20 MIN: CPT

## 2023-06-19 NOTE — PROGRESS NOTES
[address]  [address] [place] [address], [address] Redd Muñiz  Tel: [phone]   Fax: [phone]    Subjective     CC: Left foot pain    Interval History:   Pt returns to clinic today for left toe pain. He states he bumped his left great toe a few weeks ago and was in pain. He visited the ED and was advised to follow up at the podiatry clinic. Patient also states he would like to discuss a bunion surgery. Pt had his new xrays done and would like to discuss them. Pt denies any other pedal complaints at this time. HPI:  Jagdish Sin is a 66y.o. year old male who returns to clinic today for follow up of left 1st MPJ pain. The patient explains the pain and swelling has reduced since his last visit. Describes that sometimes his big toe joint gets red, painful, and swollen and it has been occurring off and on for some time. He did forget to obtain the foot xrays previously ordered. Denies known history of gout. No new concerns today. Primary care physician is Susanna Collins MD.    ROS:    Constitutional: Denies nausea, vomiting, fever, chills. Neurologic: Denies numbness, tingling, and burning in the feet. Vascular: Denies symptoms of lower extremity claudication. Skin: Denies open wounds. Otherwise negative except as noted in the HPI. Objective     Vitals:    06/19/23 1200   BP: 124/74   Pulse: 71       Lab Results   Component Value Date    LABA1C 7.1 05/09/2023       No results found for: SEDRATE    Physical Exam:  General:  Alert and oriented x3. In no acute distress. Lower Extremity Physical Exam:    Vascular: DP and PT pulses are non palpable but audible on doppler. CFT <3 seconds to all digits. No pedal edema. Hair growth is absent to the level of all pedal digits. Neuro: Saph/sural/SP/DP/plantar sensation present to light touch. Protective sensation is intact to all sites as tested with a 5.07g SWMF to both feet.      Musculoskeletal: EHL/FHL/GS/TA gross motor intact

## 2023-06-20 DIAGNOSIS — M25.511 ACUTE PAIN OF RIGHT SHOULDER: ICD-10-CM

## 2023-06-21 NOTE — TELEPHONE ENCOUNTER
E-scribe request for IBUPROFEN AND FLEXERIL. Please review and e-scribe if applicable. Last Visit Date:  6/13/2023  Next Visit Date:  7/11/2023    Hemoglobin A1C (%)   Date Value   05/09/2023 7.1   02/20/2023 9.8   09/01/2022 6.9             ( goal A1C is < 7)   Microalb/Crt. Ratio (mcg/mg creat)   Date Value   03/06/2023 960 (H)     LDL Cholesterol (mg/dL)   Date Value   03/06/2023 130     LDL Calculated (mg/dL)   Date Value   07/24/2013 120       (goal LDL is <100)   AST (U/L)   Date Value   08/26/2021 10     ALT (U/L)   Date Value   08/26/2021 10     BUN (mg/dL)   Date Value   03/06/2023 20     BP Readings from Last 3 Encounters:   06/19/23 124/74   06/13/23 126/75   06/12/23 (!) 174/96          (goal 120/80)        Patient Active Problem List:     HLD (hyperlipidemia)     Essential hypertension     DM (diabetes mellitus) ([address])     Chronic back pain     Thoracic aortic aneurysm diagnosed  2010?  at [facility]     Acute pain of right shoulder     Other male erectile dysfunction     Poorly controlled diabetes mellitus ([address])     Long term (current) use of antithrombotics/antiplatelets     Chronic bilateral low back pain without sciatica     Atheroscler of native artery of both legs with intermit claudication (HCC)     Sprain of collateral ligament of interphalangeal joint of great toe     Diastasis recti      ----JF

## 2023-06-30 RX ORDER — CYCLOBENZAPRINE HCL 10 MG
TABLET ORAL
Qty: 10 TABLET | Refills: 10 | OUTPATIENT
Start: 2023-06-30

## 2023-06-30 RX ORDER — IBUPROFEN 600 MG/1
600 TABLET ORAL 3 TIMES DAILY PRN
Qty: 30 TABLET | Refills: 0 | OUTPATIENT
Start: 2023-06-30

## 2023-06-30 RX ORDER — CYCLOBENZAPRINE HCL 10 MG
10 TABLET ORAL NIGHTLY PRN
Qty: 10 TABLET | Refills: 0 | OUTPATIENT
Start: 2023-06-30

## 2023-06-30 RX ORDER — IBUPROFEN 800 MG/1
TABLET ORAL
Qty: 30 TABLET | Refills: 10 | OUTPATIENT
Start: 2023-06-30

## 2023-07-10 DIAGNOSIS — Z86.73 HISTORY OF STROKE: ICD-10-CM

## 2023-07-10 DIAGNOSIS — I10 ESSENTIAL HYPERTENSION: ICD-10-CM

## 2023-07-11 ENCOUNTER — OFFICE VISIT (OUTPATIENT)
Dept: FAMILY MEDICINE CLINIC | Age: 63
End: 2023-07-11
Payer: MEDICARE

## 2023-07-11 VITALS
HEART RATE: 58 BPM | BODY MASS INDEX: 27.89 KG/M2 | HEIGHT: 68 IN | WEIGHT: 184 LBS | DIASTOLIC BLOOD PRESSURE: 85 MMHG | SYSTOLIC BLOOD PRESSURE: 135 MMHG

## 2023-07-11 DIAGNOSIS — I10 ESSENTIAL HYPERTENSION: Primary | ICD-10-CM

## 2023-07-11 DIAGNOSIS — E11.65 TYPE 2 DIABETES MELLITUS WITH HYPERGLYCEMIA, WITHOUT LONG-TERM CURRENT USE OF INSULIN (HCC): ICD-10-CM

## 2023-07-11 DIAGNOSIS — S93.513A SPRAIN OF COLLATERAL LIGAMENT OF INTERPHALANGEAL JOINT OF GREAT TOE: ICD-10-CM

## 2023-07-11 PROCEDURE — 3017F COLORECTAL CA SCREEN DOC REV: CPT

## 2023-07-11 PROCEDURE — 3075F SYST BP GE 130 - 139MM HG: CPT

## 2023-07-11 PROCEDURE — 1036F TOBACCO NON-USER: CPT

## 2023-07-11 PROCEDURE — G8417 CALC BMI ABV UP PARAM F/U: HCPCS

## 2023-07-11 PROCEDURE — 2022F DILAT RTA XM EVC RTNOPTHY: CPT

## 2023-07-11 PROCEDURE — 3079F DIAST BP 80-89 MM HG: CPT

## 2023-07-11 PROCEDURE — G8427 DOCREV CUR MEDS BY ELIG CLIN: HCPCS

## 2023-07-11 PROCEDURE — 3051F HG A1C>EQUAL 7.0%<8.0%: CPT

## 2023-07-11 PROCEDURE — 99213 OFFICE O/P EST LOW 20 MIN: CPT

## 2023-07-11 RX ORDER — AMLODIPINE BESYLATE 10 MG/1
TABLET ORAL
Qty: 30 TABLET | Refills: 10 | Status: SHIPPED | OUTPATIENT
Start: 2023-07-11

## 2023-07-11 ASSESSMENT — ENCOUNTER SYMPTOMS
DIARRHEA: 0
VOMITING: 0
CONSTIPATION: 0
NAUSEA: 0
ABDOMINAL PAIN: 0
SHORTNESS OF BREATH: 0

## 2023-07-11 NOTE — TELEPHONE ENCOUNTER
E-scribe request for med refills. Please review and e-scribe if applicable. Last Visit Date:  6/13/23  Next Visit Date:  7/11/2023    Hemoglobin A1C (%)   Date Value   05/09/2023 7.1   02/20/2023 9.8   09/01/2022 6.9             ( goal A1C is < 7)   Microalb/Crt. Ratio (mcg/mg creat)   Date Value   03/06/2023 960 (H)     LDL Cholesterol (mg/dL)   Date Value   03/06/2023 130     LDL Calculated (mg/dL)   Date Value   07/24/2013 120       (goal LDL is <100)   AST (U/L)   Date Value   08/26/2021 10     ALT (U/L)   Date Value   08/26/2021 10     BUN (mg/dL)   Date Value   03/06/2023 20     BP Readings from Last 3 Encounters:   06/19/23 124/74   06/13/23 126/75   06/12/23 (!) 174/96          (goal 120/80)        Patient Active Problem List:     HLD (hyperlipidemia)     Essential hypertension     DM (diabetes mellitus) ([address])     Chronic back pain     Thoracic aortic aneurysm diagnosed  2010?  at [facility]     Acute pain of right shoulder     Other male erectile dysfunction     Poorly controlled diabetes mellitus ([address])     Long term (current) use of antithrombotics/antiplatelets     Chronic bilateral low back pain without sciatica     Atheroscler of native artery of both legs with intermit claudication (HCC)     Sprain of collateral ligament of interphalangeal joint of great toe     Diastasis recti      ----Jd Judd

## 2023-07-11 NOTE — TELEPHONE ENCOUNTER
E-scribe request for med refills. Please review and e-scribe if applicable. Last Visit Date:  6/13/23  Next Visit Date:  7/10/2023    Hemoglobin A1C (%)   Date Value   05/09/2023 7.1   02/20/2023 9.8   09/01/2022 6.9             ( goal A1C is < 7)   Microalb/Crt. Ratio (mcg/mg creat)   Date Value   03/06/2023 960 (H)     LDL Cholesterol (mg/dL)   Date Value   03/06/2023 130     LDL Calculated (mg/dL)   Date Value   07/24/2013 120       (goal LDL is <100)   AST (U/L)   Date Value   08/26/2021 10     ALT (U/L)   Date Value   08/26/2021 10     BUN (mg/dL)   Date Value   03/06/2023 20     BP Readings from Last 3 Encounters:   06/19/23 124/74   06/13/23 126/75   06/12/23 (!) 174/96          (goal 120/80)        Patient Active Problem List:     HLD (hyperlipidemia)     Essential hypertension     DM (diabetes mellitus) ([address])     Chronic back pain     Thoracic aortic aneurysm diagnosed  2010?  at [facility]     Acute pain of right shoulder     Other male erectile dysfunction     Poorly controlled diabetes mellitus ([address])     Long term (current) use of antithrombotics/antiplatelets     Chronic bilateral low back pain without sciatica     Atheroscler of native artery of both legs with intermit claudication (HCC)     Sprain of collateral ligament of interphalangeal joint of great toe     Diastasis recti      ----Earljanel Promise

## 2023-07-11 NOTE — PROGRESS NOTES
Visit Information    Have you changed or started any medications since your last visit including any over-the-counter medicines, vitamins, or herbal medicines? no   Have you stopped taking any of your medications? Is so, why? -  no  Are you having any side effects from any of your medications? - no    Have you seen any other physician or provider since your last visit? yes - Podiatry 6/19/23  Have you had any other diagnostic tests since your last visit?  no   Have you been seen in the emergency room and/or had an admission in a hospital since we last saw you?  no   Have you had your routine dental cleaning in the past 6 months?  no     Do you have an active MyChart account? If no, what is the barrier?   No: Pending    Patient Care Team:  Jill Zuñiga MD as PCP - General (Family Medicine)  Bob Qureshi MD as PCP - Empaneled Provider    Medical History Review  Past Medical, Family, and Social History reviewed and does contribute to the patient presenting condition    Health Maintenance   Topic Date Due    COVID-19 Vaccine (1) Never done    Shingles vaccine (1 of 2) Never done    Diabetic retinal exam  03/30/2016    Diabetic foot exam  09/18/2018    Annual Wellness Visit (AWV)  07/19/2023    Flu vaccine (1) 08/01/2023    Diabetic Alb to Cr ratio (uACR) test  03/06/2024    Lipids  03/06/2024    GFR test (Diabetes, CKD 3-4, OR last GFR 15-59)  03/06/2024    A1C test (Diabetic or Prediabetic)  05/09/2024    Depression Screen  06/13/2024    Colorectal Cancer Screen  08/25/2024    DTaP/Tdap/Td vaccine (2 - Td or Tdap) 06/16/2027    Pneumococcal 0-64 years Vaccine  Completed    Hepatitis C screen  Addressed    HIV screen  Addressed    Hepatitis A vaccine  Aged Out    Hib vaccine  Aged Out    Meningococcal (ACWY) vaccine  Aged Out

## 2023-07-13 DIAGNOSIS — M25.511 ACUTE PAIN OF RIGHT SHOULDER: ICD-10-CM

## 2023-07-13 RX ORDER — IBUPROFEN 800 MG/1
TABLET ORAL
Qty: 30 TABLET | Refills: 10 | OUTPATIENT
Start: 2023-07-13

## 2023-07-13 RX ORDER — CYCLOBENZAPRINE HCL 10 MG
TABLET ORAL
Qty: 10 TABLET | Refills: 10 | OUTPATIENT
Start: 2023-07-13

## 2023-07-14 DIAGNOSIS — S93.513A SPRAIN OF COLLATERAL LIGAMENT OF INTERPHALANGEAL JOINT OF GREAT TOE: ICD-10-CM

## 2023-07-17 RX ORDER — CLOPIDOGREL BISULFATE 75 MG/1
TABLET ORAL
Qty: 30 TABLET | Refills: 10 | OUTPATIENT
Start: 2023-07-17

## 2023-07-18 NOTE — PROGRESS NOTES
Patient instructed to remove shoes and socks and instructed to sit in exam chair. Current PCP is Shan Olsen MD and date of last visit was 7/11/23. Do you have a follow up visit scheduled? Yes  If yes, the date is 9/11/23    Diabetic visit information    Blood pressure (Control is BP <140/90)  BP Readings from Last 3 Encounters:   07/11/23 135/85   06/19/23 124/74   06/13/23 126/75       BP taken with correct size cuff? - Yes   Repeated if > 140/90 Yes      Tobacco use:  Patient  reports that he quit smoking about 24 years ago. His smoking use included cigarettes. He has a 2.50 pack-year smoking history. He has never used smokeless tobacco.  If Smoker - Cessation materials given? - Yes       Diabetic Health Maintenance Items due  Diabetes Management   Topic Date Due    Diabetic retinal exam  03/30/2016    Diabetic foot exam  09/18/2018       Diabetic retinal exam done in last year? - Yes   If No: remind patient that it is due and they should schedule an exam    Medications  Is patient taking any medications for diabetes? -   Yes  Have blood sugars been controlled? Fasting blood sugars under 120   -   Yes   Random home sugars or today's POCT glucose is under 180 -   Yes   []  If No to the above then patient should schedule appt with PCP.      Diabetic Plan    A1C Plan  Lab Results   Component Value Date    LABA1C 7.1 05/09/2023    LABA1C 9.8 02/20/2023    LABA1C 6.9 09/01/2022      []  If A1C over 8 and last result >3 months ago - Order A1C and refer to PCP   []  If last A1C over 6 months ago - Order A1C and refer to PCP for follow up   []  If elevated blood sugars > 180 - refer to PCP for follow up    []  Blood sugar controlled - A1C under 8 and last check was < 6 months      Cholesterol Plan   Lab Results   Component Value Date    LDLCALC 120 07/24/2013    LDLCHOLESTEROL 130 03/06/2023      []  If LDL > 100 and last result >3 months ago - order Fasting lipids and refer to PCP for follow up   []  If LDL < 100

## 2023-07-19 DIAGNOSIS — M25.511 ACUTE PAIN OF RIGHT SHOULDER: ICD-10-CM

## 2023-07-20 RX ORDER — CYCLOBENZAPRINE HCL 10 MG
TABLET ORAL
Qty: 10 TABLET | Refills: 10 | OUTPATIENT
Start: 2023-07-20

## 2023-07-20 RX ORDER — IBUPROFEN 800 MG/1
TABLET ORAL
Qty: 30 TABLET | Refills: 10 | OUTPATIENT
Start: 2023-07-20

## 2023-07-23 DIAGNOSIS — Z86.73 HISTORY OF STROKE: ICD-10-CM

## 2023-07-24 ENCOUNTER — OFFICE VISIT (OUTPATIENT)
Dept: PODIATRY | Age: 63
End: 2023-07-24
Payer: MEDICARE

## 2023-07-24 VITALS
WEIGHT: 184 LBS | BODY MASS INDEX: 27.89 KG/M2 | HEART RATE: 74 BPM | HEIGHT: 68 IN | DIASTOLIC BLOOD PRESSURE: 80 MMHG | SYSTOLIC BLOOD PRESSURE: 145 MMHG

## 2023-07-24 DIAGNOSIS — M20.5X2 HALLUX LIMITUS OF LEFT FOOT: ICD-10-CM

## 2023-07-24 DIAGNOSIS — M21.612 BUNION OF GREAT TOE OF LEFT FOOT: ICD-10-CM

## 2023-07-24 DIAGNOSIS — M20.12 HALLUX VALGUS OF LEFT FOOT: ICD-10-CM

## 2023-07-24 DIAGNOSIS — S93.513A SPRAIN OF COLLATERAL LIGAMENT OF INTERPHALANGEAL JOINT OF GREAT TOE: Primary | ICD-10-CM

## 2023-07-24 DIAGNOSIS — E11.51 DIABETES MELLITUS WITH PERIPHERAL VASCULAR DISEASE (HCC): ICD-10-CM

## 2023-07-24 DIAGNOSIS — M20.22 HALLUX RIGIDUS OF LEFT FOOT: ICD-10-CM

## 2023-07-24 DIAGNOSIS — M20.42 HAMMER TOE OF LEFT FOOT: ICD-10-CM

## 2023-07-24 DIAGNOSIS — M79.672 LEFT FOOT PAIN: ICD-10-CM

## 2023-07-24 DIAGNOSIS — M79.671 PAIN IN RIGHT FOOT: ICD-10-CM

## 2023-07-24 DIAGNOSIS — B35.1 ONYCHOMYCOSIS: ICD-10-CM

## 2023-07-24 DIAGNOSIS — M20.41 HAMMER TOE OF RIGHT FOOT: ICD-10-CM

## 2023-07-24 DIAGNOSIS — I73.9 PERIPHERAL VASCULAR DISORDER (HCC): ICD-10-CM

## 2023-07-24 PROCEDURE — 1036F TOBACCO NON-USER: CPT

## 2023-07-24 PROCEDURE — 99213 OFFICE O/P EST LOW 20 MIN: CPT

## 2023-07-24 PROCEDURE — 99212 OFFICE O/P EST SF 10 MIN: CPT | Performed by: PODIATRIST

## 2023-07-24 PROCEDURE — 3051F HG A1C>EQUAL 7.0%<8.0%: CPT

## 2023-07-24 PROCEDURE — 2022F DILAT RTA XM EVC RTNOPTHY: CPT

## 2023-07-24 PROCEDURE — 3017F COLORECTAL CA SCREEN DOC REV: CPT

## 2023-07-24 PROCEDURE — 3078F DIAST BP <80 MM HG: CPT

## 2023-07-24 PROCEDURE — G8427 DOCREV CUR MEDS BY ELIG CLIN: HCPCS

## 2023-07-24 PROCEDURE — G8417 CALC BMI ABV UP PARAM F/U: HCPCS

## 2023-07-24 PROCEDURE — 3074F SYST BP LT 130 MM HG: CPT

## 2023-07-24 NOTE — PROGRESS NOTES
[address] [place] [address] Way  Tel: [phone]   Fax: [phone]    Subjective     CC: Left foot pain    Interval History:   Patient returns to clinic today for left toe pain at his bunion. He states he hurt his left great toe 2 month ago and has not been able to return to work due to the pain. Patient states his pain is feeling better today. Patient stopped wearing his surgical shoe last Thursday and uses ibuprofen as needed for pain. He has transitioned into wearing normal shoe gear, but hasn't tried new, wider toed shoes yet. Patient would like to discuss bunion surgery again. Patient would like to know if he is able to return to work. Patient denies any other pedal complaints at this time. HPI:  Jeannie Hoyt is a 66y.o. year old male who returns to clinic today for follow up of left 1st MPJ pain. The patient explains the pain and swelling has reduced since his last visit. Describes that sometimes his big toe joint gets red, painful, and swollen and it has been occurring off and on for some time. He did forget to obtain the foot xrays previously ordered. Denies known history of gout. No new concerns today. Primary care physician is Subha Jeronimo MD.    ROS:    Constitutional: Denies nausea, vomiting, fever, chills. Neurologic: Denies numbness, tingling, and burning in the feet. Vascular: Denies symptoms of lower extremity claudication. Skin: Denies open wounds. Otherwise negative except as noted in the HPI. Objective     Vitals:    07/24/23 1236   BP: (!) 145/80   Pulse: 74       Lab Results   Component Value Date    LABA1C 7.1 05/09/2023       No results found for: SEDRATE    Physical Exam:  General:  Alert and oriented x3. In no acute distress. Lower Extremity Physical Exam:    Vascular: DP and PT pulses are non palpable but audible on doppler. CFT <3 seconds to all digits. No pedal edema.   Hair growth is absent to the level of all

## 2023-07-24 NOTE — TELEPHONE ENCOUNTER
Last visit: 07/11/23  Last Med refill:   Does patient have enough medication for 72 hours: No: The original prescription was discontinued on 4/7/2023 by Ivan Goodrich MD for the following reason: LIST CLEANUP. Renewing this prescription may not be appropriate.     Next Visit Date:  Future Appointments   Date Time Provider [address]   7/24/2023 12:45 PM Kortney Mcgee DPM [facility] Podiatry [facility]   8/11/2023  1:00 PM Vivian Naylor MD heartvasc [facility]   9/11/2023  1:45 PM Ivan Goodrich MD [address] [place] [address],[place] [address] Maintenance   Topic Date Due    COVID-19 Vaccine (1) Never done    Shingles vaccine (1 of 2) Never done    Diabetic retinal exam  03/30/2016    Diabetic foot exam  09/18/2018    Annual Wellness Visit (AWV)  07/19/2023    Flu vaccine (1) 08/01/2023    Diabetic Alb to Cr ratio (uACR) test  03/06/2024    Lipids  03/06/2024    GFR test (Diabetes, CKD 3-4, OR last GFR 15-59)  03/06/2024    A1C test (Diabetic or Prediabetic)  05/09/2024    Depression Screen  06/13/2024    Colorectal Cancer Screen  08/25/2024    DTaP/Tdap/Td vaccine (2 - Td or Tdap) 06/16/2027    Pneumococcal 0-64 years Vaccine  Completed    Hepatitis C screen  Addressed    HIV screen  Addressed    Hepatitis A vaccine  Aged Out    Hib vaccine  Aged Out    Meningococcal (ACWY) vaccine  Aged Out       Hemoglobin A1C (%)   Date Value   05/09/2023 7.1   02/20/2023 9.8   09/01/2022 6.9             ( goal A1C is < 7)   No components found for: LABMICR  LDL Cholesterol (mg/dL)   Date Value   03/06/2023 130   08/26/2021 52     LDL Calculated (mg/dL)   Date Value   07/24/2013 120       (goal LDL is <100)   AST (U/L)   Date Value   08/26/2021 10     ALT (U/L)   Date Value   08/26/2021 10     BUN (mg/dL)   Date Value   03/06/2023 20     BP Readings from Last 3 Encounters:   07/11/23 135/85   06/19/23 124/74   06/13/23 126/75          (goal 120/80)    All Future Testing planned in [facility]  Lab Frequency Next Occurrence   VL ARTERIAL PVR

## 2023-07-25 DIAGNOSIS — M25.511 ACUTE PAIN OF RIGHT SHOULDER: ICD-10-CM

## 2023-07-27 RX ORDER — IBUPROFEN 800 MG/1
TABLET ORAL
Qty: 30 TABLET | Refills: 10 | OUTPATIENT
Start: 2023-07-27

## 2023-07-27 RX ORDER — CYCLOBENZAPRINE HCL 10 MG
TABLET ORAL
Qty: 10 TABLET | Refills: 10 | OUTPATIENT
Start: 2023-07-27

## 2023-07-27 RX ORDER — CLOPIDOGREL BISULFATE 75 MG/1
TABLET ORAL
Qty: 30 TABLET | Refills: 10 | OUTPATIENT
Start: 2023-07-27

## 2023-07-30 DIAGNOSIS — Z86.73 HISTORY OF STROKE: ICD-10-CM

## 2023-07-31 DIAGNOSIS — M25.511 ACUTE PAIN OF RIGHT SHOULDER: ICD-10-CM

## 2023-07-31 RX ORDER — CLOPIDOGREL BISULFATE 75 MG/1
TABLET ORAL
Qty: 30 TABLET | Refills: 10 | OUTPATIENT
Start: 2023-07-31

## 2023-08-01 RX ORDER — IBUPROFEN 800 MG/1
TABLET ORAL
Qty: 30 TABLET | Refills: 10 | OUTPATIENT
Start: 2023-08-01

## 2023-08-01 RX ORDER — CYCLOBENZAPRINE HCL 10 MG
TABLET ORAL
Qty: 10 TABLET | Refills: 10 | OUTPATIENT
Start: 2023-08-01

## 2023-08-04 ENCOUNTER — TELEPHONE (OUTPATIENT)
Dept: FAMILY MEDICINE CLINIC | Age: 63
End: 2023-08-04

## 2023-08-04 NOTE — TELEPHONE ENCOUNTER
[facility] is requesting refill on the patient plavix but I do not see in patient medication list.

## 2023-08-04 NOTE — TELEPHONE ENCOUNTER
Patient called back stating that he did not request for his pharmacy to refill his plavix, so do not refill plavix.

## 2023-08-06 DIAGNOSIS — M25.511 ACUTE PAIN OF RIGHT SHOULDER: ICD-10-CM

## 2023-08-07 NOTE — TELEPHONE ENCOUNTER
Essential hypertension     DM (diabetes mellitus) (HCC)     Chronic back pain     Thoracic aortic aneurysm diagnosed  2010?  at [facility]     Acute pain of right shoulder     Other male erectile dysfunction     Poorly controlled diabetes mellitus ([address])     Long term (current) use of antithrombotics/antiplatelets     Chronic bilateral low back pain without sciatica     Atheroscler of native artery of both legs with intermit claudication (HCC)     Sprain of collateral ligament of interphalangeal joint of great toe     Diastasis recti

## 2023-08-09 ENCOUNTER — TELEPHONE (OUTPATIENT)
Dept: VASCULAR SURGERY | Age: 63
End: 2023-08-09

## 2023-08-10 RX ORDER — CYCLOBENZAPRINE HCL 10 MG
TABLET ORAL
Qty: 10 TABLET | Refills: 10 | OUTPATIENT
Start: 2023-08-10

## 2023-08-10 RX ORDER — IBUPROFEN 800 MG/1
TABLET ORAL
Qty: 30 TABLET | Refills: 10 | OUTPATIENT
Start: 2023-08-10

## 2023-08-14 DIAGNOSIS — M25.511 ACUTE PAIN OF RIGHT SHOULDER: ICD-10-CM

## 2023-08-14 RX ORDER — CYCLOBENZAPRINE HCL 10 MG
TABLET ORAL
Qty: 10 TABLET | Refills: 10 | OUTPATIENT
Start: 2023-08-14

## 2023-08-14 RX ORDER — IBUPROFEN 800 MG/1
TABLET ORAL
Qty: 30 TABLET | Refills: 10 | OUTPATIENT
Start: 2023-08-14

## 2023-08-21 DIAGNOSIS — M25.511 ACUTE PAIN OF RIGHT SHOULDER: ICD-10-CM

## 2023-08-21 NOTE — TELEPHONE ENCOUNTER
Last visit: [id number]  Last Med refill: [id number]  Does patient have enough medication for 72 hours: No:     Next Visit Date:  Future Appointments   Date Time Provider [address]   9/11/2023  1:45 PM Aleida Bell MD [address] [place] [address],[place] [address] Maintenance   Topic Date Due    COVID-19 Vaccine (1) Never done    Shingles vaccine (1 of 2) Never done    Diabetic retinal exam  03/30/2016    Diabetic foot exam  09/18/2018    Annual Wellness Visit (AWV)  07/19/2023    Flu vaccine (1) Never done    Diabetic Alb to Cr ratio (uACR) test  03/06/2024    Lipids  03/06/2024    GFR test (Diabetes, CKD 3-4, OR last GFR 15-59)  03/06/2024    A1C test (Diabetic or Prediabetic)  05/09/2024    Depression Screen  06/13/2024    Colorectal Cancer Screen  08/25/2024    DTaP/Tdap/Td vaccine (2 - Td or Tdap) 06/16/2027    Pneumococcal 0-64 years Vaccine  Completed    Hepatitis C screen  Addressed    HIV screen  Addressed    Hepatitis A vaccine  Aged Out    Hib vaccine  Aged Out    Meningococcal (ACWY) vaccine  Aged Out       Hemoglobin A1C (%)   Date Value   05/09/2023 7.1   02/20/2023 9.8   09/01/2022 6.9             ( goal A1C is < 7)   No components found for: LABMICR  LDL Cholesterol (mg/dL)   Date Value   03/06/2023 130   08/26/2021 52     LDL Calculated (mg/dL)   Date Value   07/24/2013 120       (goal LDL is <100)   AST (U/L)   Date Value   08/26/2021 10     ALT (U/L)   Date Value   08/26/2021 10     BUN (mg/dL)   Date Value   03/06/2023 20     BP Readings from Last 3 Encounters:   07/24/23 (!) 145/80   07/11/23 135/85   06/19/23 124/74          (goal 120/80)    All Future Testing planned in [facility]  Lab Frequency Next Occurrence   VL ARTERIAL PVR LOWER WO EXERCISE Once 06/30/2023   VL DUP LOWER EXTREMITY ARTERIES BILATERAL Once 06/30/2023   Hemoglobin A1C Once 04/21/2023               Patient Active Problem List:     HLD (hyperlipidemia)     Essential hypertension     DM (diabetes mellitus) (HCC)     Chronic back pain

## 2023-08-21 NOTE — TELEPHONE ENCOUNTER
Last visit: [id number]  Last Med refill: [id number]  Does patient have enough medication for 72 hours: No:     Next Visit Date:  Future Appointments   Date Time Provider [address]   9/11/2023  1:45 PM Marbella Uribe MD [address] [place] [address],[place] [address] Maintenance   Topic Date Due    COVID-19 Vaccine (1) Never done    Shingles vaccine (1 of 2) Never done    Diabetic retinal exam  03/30/2016    Diabetic foot exam  09/18/2018    Annual Wellness Visit (AWV)  07/19/2023    Flu vaccine (1) Never done    Diabetic Alb to Cr ratio (uACR) test  03/06/2024    Lipids  03/06/2024    GFR test (Diabetes, CKD 3-4, OR last GFR 15-59)  03/06/2024    A1C test (Diabetic or Prediabetic)  05/09/2024    Depression Screen  06/13/2024    Colorectal Cancer Screen  08/25/2024    DTaP/Tdap/Td vaccine (2 - Td or Tdap) 06/16/2027    Pneumococcal 0-64 years Vaccine  Completed    Hepatitis C screen  Addressed    HIV screen  Addressed    Hepatitis A vaccine  Aged Out    Hib vaccine  Aged Out    Meningococcal (ACWY) vaccine  Aged Out       Hemoglobin A1C (%)   Date Value   05/09/2023 7.1   02/20/2023 9.8   09/01/2022 6.9             ( goal A1C is < 7)   No components found for: LABMICR  LDL Cholesterol (mg/dL)   Date Value   03/06/2023 130   08/26/2021 52     LDL Calculated (mg/dL)   Date Value   07/24/2013 120       (goal LDL is <100)   AST (U/L)   Date Value   08/26/2021 10     ALT (U/L)   Date Value   08/26/2021 10     BUN (mg/dL)   Date Value   03/06/2023 20     BP Readings from Last 3 Encounters:   07/24/23 (!) 145/80   07/11/23 135/85   06/19/23 124/74          (goal 120/80)    All Future Testing planned in [facility]  Lab Frequency Next Occurrence   VL ARTERIAL PVR LOWER WO EXERCISE Once 06/30/2023   VL DUP LOWER EXTREMITY ARTERIES BILATERAL Once 06/30/2023   Hemoglobin A1C Once 04/21/2023               Patient Active Problem List:     HLD (hyperlipidemia)     Essential hypertension     DM (diabetes mellitus) (HCC)     Chronic back pain

## 2023-08-22 RX ORDER — IBUPROFEN 800 MG/1
TABLET ORAL
Qty: 30 TABLET | Refills: 10 | OUTPATIENT
Start: 2023-08-22

## 2023-08-22 RX ORDER — CYCLOBENZAPRINE HCL 10 MG
TABLET ORAL
Qty: 10 TABLET | Refills: 10 | OUTPATIENT
Start: 2023-08-22

## 2023-08-27 DIAGNOSIS — M25.511 ACUTE PAIN OF RIGHT SHOULDER: ICD-10-CM

## 2023-08-30 RX ORDER — IBUPROFEN 800 MG/1
TABLET ORAL
Qty: 30 TABLET | Refills: 10 | OUTPATIENT
Start: 2023-08-30

## 2023-08-30 RX ORDER — CYCLOBENZAPRINE HCL 10 MG
TABLET ORAL
Qty: 10 TABLET | Refills: 10 | OUTPATIENT
Start: 2023-08-30

## 2023-09-07 DIAGNOSIS — E11.65 TYPE 2 DIABETES MELLITUS WITH HYPERGLYCEMIA, WITHOUT LONG-TERM CURRENT USE OF INSULIN (HCC): ICD-10-CM

## 2023-09-08 NOTE — TELEPHONE ENCOUNTER
Last visit: 07/11/2023  Last Med refill: 02/20/2023  Does patient have enough medication for 72 hours: No:     Next Visit Date:  Future Appointments   Date Time Provider [address]   9/11/2023  1:45 PM Che Vázquez MD [address] [place] [address],[place] [address] Maintenance   Topic Date Due    COVID-19 Vaccine (1) Never done    Shingles vaccine (1 of 2) Never done    Diabetic retinal exam  03/30/2016    Diabetic foot exam  09/18/2018    Annual Wellness Visit (AWV)  07/19/2023    Flu vaccine (1) Never done    Diabetic Alb to Cr ratio (uACR) test  03/06/2024    Lipids  03/06/2024    GFR test (Diabetes, CKD 3-4, OR last GFR 15-59)  03/06/2024    A1C test (Diabetic or Prediabetic)  05/09/2024    Depression Screen  06/13/2024    Colorectal Cancer Screen  08/25/2024    DTaP/Tdap/Td vaccine (2 - Td or Tdap) 06/16/2027    Pneumococcal 0-64 years Vaccine  Completed    Hepatitis C screen  Addressed    HIV screen  Addressed    Hepatitis A vaccine  Aged Out    Hib vaccine  Aged Out    Meningococcal (ACWY) vaccine  Aged Out       Hemoglobin A1C (%)   Date Value   05/09/2023 7.1   02/20/2023 9.8   09/01/2022 6.9             ( goal A1C is < 7)   No components found for: LABMICR  LDL Cholesterol (mg/dL)   Date Value   03/06/2023 130   08/26/2021 52     LDL Calculated (mg/dL)   Date Value   07/24/2013 120       (goal LDL is <100)   AST (U/L)   Date Value   08/26/2021 10     ALT (U/L)   Date Value   08/26/2021 10     BUN (mg/dL)   Date Value   03/06/2023 20     BP Readings from Last 3 Encounters:   07/24/23 (!) 145/80   07/11/23 135/85   06/19/23 124/74          (goal 120/80)    All Future Testing planned in [facility]  Lab Frequency Next Occurrence   VL ARTERIAL PVR LOWER WO EXERCISE Once 06/30/2023   VL DUP LOWER EXTREMITY ARTERIES BILATERAL Once 06/30/2023   Hemoglobin A1C Once 04/21/2023               Patient Active Problem List:     HLD (hyperlipidemia)     Essential hypertension     DM (diabetes mellitus) (HCC)     Chronic back pain

## 2023-09-10 RX ORDER — EMPAGLIFLOZIN 10 MG/1
TABLET, FILM COATED ORAL
Qty: 30 TABLET | Refills: 10 | Status: SHIPPED | OUTPATIENT
Start: 2023-09-10

## 2023-09-12 RX ORDER — CLOPIDOGREL BISULFATE 75 MG/1
TABLET ORAL
Qty: 30 TABLET | Refills: 0 | Status: SHIPPED | OUTPATIENT
Start: 2023-09-12

## 2023-09-12 NOTE — TELEPHONE ENCOUNTER
Please address the medication refill and close the encounter. If I can be of assistance, please route to the applicable pool. Thank you.       Last visit: 7-11-23  Last Med refill: 3-1-22  Does patient have enough medication for 72 hours: No:     Next Visit Date:  Future Appointments   Date Time Provider [facility] [address]   9/26/2023 10:45 AM Matthieu Cornejo MD [address] [place] [address],[place] [address] Maintenance   Topic Date Due    COVID-19 Vaccine (1) Never done    Shingles vaccine (1 of 2) Never done    Diabetic retinal exam  03/30/2016    Pneumococcal 0-64 years Vaccine (2 - PCV) 06/16/2018    Diabetic foot exam  09/18/2018    Hepatitis B vaccine (1 of 3 - Risk 3-dose series) Never done    Annual Wellness Visit (AWV)  07/19/2023    Flu vaccine (1) Never done    Diabetic Alb to Cr ratio (uACR) test  03/06/2024    Lipids  03/06/2024    GFR test (Diabetes, CKD 3-4, OR last GFR 15-59)  03/06/2024    A1C test (Diabetic or Prediabetic)  05/09/2024    Depression Screen  06/13/2024    Colorectal Cancer Screen  08/25/2024    DTaP/Tdap/Td vaccine (2 - Td or Tdap) 06/16/2027    Hepatitis C screen  Addressed    HIV screen  Addressed    Hepatitis A vaccine  Aged Out    Hib vaccine  Aged Out    Meningococcal (ACWY) vaccine  Aged Out       Hemoglobin A1C (%)   Date Value   05/09/2023 7.1   02/20/2023 9.8   09/01/2022 6.9             ( goal A1C is < 7)   No components found for: \"LABMICR\"  LDL Cholesterol (mg/dL)   Date Value   03/06/2023 130   08/26/2021 52     LDL Calculated (mg/dL)   Date Value   07/24/2013 120       (goal LDL is <100)   AST (U/L)   Date Value   08/26/2021 10     ALT (U/L)   Date Value   08/26/2021 10     BUN (mg/dL)   Date Value   03/06/2023 20     BP Readings from Last 3 Encounters:   07/24/23 (!) 145/80   07/11/23 135/85   06/19/23 124/74          (goal 120/80)    All Future Testing planned in [facility]  Lab Frequency Next Occurrence   VL ARTERIAL PVR LOWER WO EXERCISE Once 06/30/2023   VL DUP LOWER EXTREMITY

## 2023-09-26 ENCOUNTER — OFFICE VISIT (OUTPATIENT)
Dept: FAMILY MEDICINE CLINIC | Age: 63
End: 2023-09-26
Payer: MEDICARE

## 2023-09-26 VITALS
HEART RATE: 68 BPM | HEIGHT: 68 IN | DIASTOLIC BLOOD PRESSURE: 78 MMHG | SYSTOLIC BLOOD PRESSURE: 142 MMHG | BODY MASS INDEX: 27.43 KG/M2 | WEIGHT: 181 LBS

## 2023-09-26 DIAGNOSIS — K43.9 VENTRAL HERNIA WITHOUT OBSTRUCTION OR GANGRENE: ICD-10-CM

## 2023-09-26 DIAGNOSIS — M54.50 CHRONIC BILATERAL LOW BACK PAIN WITHOUT SCIATICA: Chronic | ICD-10-CM

## 2023-09-26 DIAGNOSIS — M16.12 PRIMARY OSTEOARTHRITIS OF LEFT HIP: Primary | ICD-10-CM

## 2023-09-26 DIAGNOSIS — G89.29 CHRONIC BILATERAL LOW BACK PAIN WITHOUT SCIATICA: Chronic | ICD-10-CM

## 2023-09-26 DIAGNOSIS — R14.3 FLATULENCE: ICD-10-CM

## 2023-09-26 DIAGNOSIS — E11.9 TYPE 2 DIABETES MELLITUS WITHOUT COMPLICATION, WITHOUT LONG-TERM CURRENT USE OF INSULIN (HCC): ICD-10-CM

## 2023-09-26 PROCEDURE — 3051F HG A1C>EQUAL 7.0%<8.0%: CPT

## 2023-09-26 PROCEDURE — 3017F COLORECTAL CA SCREEN DOC REV: CPT

## 2023-09-26 PROCEDURE — G8427 DOCREV CUR MEDS BY ELIG CLIN: HCPCS

## 2023-09-26 PROCEDURE — 3077F SYST BP >= 140 MM HG: CPT

## 2023-09-26 PROCEDURE — 3078F DIAST BP <80 MM HG: CPT

## 2023-09-26 PROCEDURE — 1036F TOBACCO NON-USER: CPT

## 2023-09-26 PROCEDURE — 2022F DILAT RTA XM EVC RTNOPTHY: CPT

## 2023-09-26 PROCEDURE — G8417 CALC BMI ABV UP PARAM F/U: HCPCS

## 2023-09-26 PROCEDURE — 99213 OFFICE O/P EST LOW 20 MIN: CPT

## 2023-09-26 RX ORDER — SIMETHICONE 80 MG
80 TABLET,CHEWABLE ORAL 4 TIMES DAILY PRN
Qty: 180 TABLET | Refills: 3 | Status: SHIPPED | OUTPATIENT
Start: 2023-09-26

## 2023-09-26 NOTE — PROGRESS NOTES
Visit Information    Have you changed or started any medications since your last visit including any over-the-counter medicines, vitamins, or herbal medicines? no   Have you stopped taking any of your medications? Is so, why? -  no  Are you having any side effects from any of your medications? - no    Have you seen any other physician or provider since your last visit? yes - Podiatry 7/24/23   Have you had any other diagnostic tests since your last visit?  no   Have you been seen in the emergency room and/or had an admission in a hospital since we last saw you?  yes - [facility] ED 9/1/23 for Back Strain   Have you had your routine dental cleaning in the past 6 months?  no     Do you have an active MyChart account? If no, what is the barrier?   Yes    Patient Care Team:  Britton Pedraza MD as PCP - General (Family Medicine)  Shruthi Elias MD as PCP - Empaneled Provider    Medical History Review  Past Medical, Family, and Social History reviewed and does contribute to the patient presenting condition    Health Maintenance   Topic Date Due    COVID-19 Vaccine (1) Never done    Shingles vaccine (1 of 2) Never done    Diabetic retinal exam  03/30/2016    Pneumococcal 0-64 years Vaccine (2 - PCV) 06/16/2018    Diabetic foot exam  09/18/2018    Hepatitis B vaccine (1 of 3 - Risk 3-dose series) Never done    Annual Wellness Visit (AWV)  07/19/2023    Flu vaccine (1) Never done    Diabetic Alb to Cr ratio (uACR) test  03/06/2024    Lipids  03/06/2024    GFR test (Diabetes, CKD 3-4, OR last GFR 15-59)  03/06/2024    A1C test (Diabetic or Prediabetic)  05/09/2024    Depression Screen  06/13/2024    Colorectal Cancer Screen  08/25/2024    DTaP/Tdap/Td vaccine (2 - Td or Tdap) 06/16/2027    Hepatitis C screen  Addressed    HIV screen  Addressed    Hepatitis A vaccine  Aged Out    Hib vaccine  Aged Out    Meningococcal (ACWY) vaccine  Aged Out

## 2023-09-26 NOTE — PROGRESS NOTES
Attending Physician Statement  I have discussed the care of Moisés Divers, including pertinent history and exam findings,  with the resident. I have seen and examined the patient and the key elements of all parts of the encounter have been performed by me. I agree with the assessment, plan and orders as documented by the resident.   (Ash Ryan)    Jean Marie Tejeda MD

## 2023-10-02 ENCOUNTER — HOSPITAL ENCOUNTER (OUTPATIENT)
Dept: PHYSICAL THERAPY | Age: 63
Setting detail: THERAPIES SERIES
Discharge: HOME | End: 2023-10-02
Payer: MEDICARE

## 2023-10-02 DIAGNOSIS — I10 ESSENTIAL HYPERTENSION: ICD-10-CM

## 2023-10-02 PROCEDURE — 97162 PT EVAL MOD COMPLEX 30 MIN: CPT

## 2023-10-02 PROCEDURE — 97110 THERAPEUTIC EXERCISES: CPT

## 2023-10-02 RX ORDER — HYDROCHLOROTHIAZIDE 50 MG/1
50 TABLET ORAL DAILY
Qty: 30 TABLET | Refills: 0 | Status: SHIPPED | OUTPATIENT
Start: 2023-10-02

## 2023-10-02 NOTE — TELEPHONE ENCOUNTER
Last visit: 09/26/2023  Last Med refill: 10/15/2022  Does patient have enough medication for 72 hours: No:     Next Visit Date:  Future Appointments   Date Time Provider [address]   10/5/2023  2:00 PM Sam Oppenheim, PTA [facility]   10/9/2023  1:00 PM Sam Oppenheim, PTA [facility]   10/12/2023  1:00 PM Sam Oppenheim, PTA [facility]   11/7/2023  9:00 AM Josy Carranza MD [address] [place] [address],[facility] [place] Maintenance   Topic Date Due    COVID-19 Vaccine (1) Never done    Shingles vaccine (1 of 2) Never done    Diabetic retinal exam  03/30/2016    Pneumococcal 0-64 years Vaccine (2 - PCV) 06/16/2018    Diabetic foot exam  09/18/2018    Hepatitis B vaccine (1 of 3 - Risk 3-dose series) Never done    Annual Wellness Visit (AWV)  07/19/2023    Flu vaccine (1) Never done    Diabetic Alb to Cr ratio (uACR) test  03/06/2024    Lipids  03/06/2024    GFR test (Diabetes, CKD 3-4, OR last GFR 15-59)  03/06/2024    A1C test (Diabetic or Prediabetic)  05/09/2024    Depression Screen  06/13/2024    Colorectal Cancer Screen  08/25/2024    DTaP/Tdap/Td vaccine (2 - Td or Tdap) 06/16/2027    Hepatitis C screen  Addressed    HIV screen  Addressed    Hepatitis A vaccine  Aged Out    Hib vaccine  Aged Out    Meningococcal (ACWY) vaccine  Aged Out       Hemoglobin A1C (%)   Date Value   05/09/2023 7.1   02/20/2023 9.8   09/01/2022 6.9             ( goal A1C is < 7)   No components found for: \"LABMICR\"  LDL Cholesterol (mg/dL)   Date Value   03/06/2023 130   08/26/2021 52     LDL Calculated (mg/dL)   Date Value   07/24/2013 120       (goal LDL is <100)   AST (U/L)   Date Value   08/26/2021 10     ALT (U/L)   Date Value   08/26/2021 10     BUN (mg/dL)   Date Value   03/06/2023 20     BP Readings from Last 3 Encounters:   09/26/23 (!) 142/78   07/24/23 (!) 145/80   07/11/23 135/85          (goal 120/80)    All Future Testing planned in [facility]  Lab Frequency Next Occurrence   VL ARTERIAL PVR LOWER WO

## 2023-10-02 NOTE — FLOWSHEET NOTE
[facility]aryl Fall Risk Assessment    Patient Name:  Alma Mccollum  : 1960    Risk Factor Scale  Score   History of Falls [] Yes  [x] No; pt reports related to alcohol use. 25  0 0   Secondary Diagnosis [] Yes  [x] No 15  0 0   Ambulatory Aid [] Furniture  [] Crutches/cane/walker  [x] None/bedrest/wheelchair/nurse 30  15  0 0   IV/Heparin Lock [] Yes  [x] No 20  0 0   Gait/Transferring [] Impaired  [] Weak  [x] Normal/bedrest/immobile 20  10  0 0   Mental Status [] Forgets limitations  [x] Oriented to own ability 15  0 0      Total:0     Based on the Assessment score: check the appropriate box. [x]  No intervention needed   Low =   Score of 0-24    []  Use standard prevention interventions Moderate =  Score of 24-44   [] Give patient handout and discuss fall prevention strategies   [] Establish goal of education for patient/family RE: fall prevention strategies    []  Use high risk prevention interventions High = Score of 45 and higher   [] Give patient handout and discuss fall prevention strategies   [] Establish goal of education for patient/family Re: fall prevention strategies   [] Discuss lifeline / other resources    Electronically signed by:   Pat Santamaria, SPT Patient evaluated and treated, and note written by Pat Santamaria, Student PT under direct supervision of Krishan Hoyt PT.     Date: 10/2/2023

## 2023-10-05 ENCOUNTER — HOSPITAL ENCOUNTER (OUTPATIENT)
Dept: PHYSICAL THERAPY | Age: 63
Setting detail: THERAPIES SERIES
Discharge: HOME | End: 2023-10-05
Payer: MEDICARE

## 2023-10-05 PROCEDURE — 97110 THERAPEUTIC EXERCISES: CPT

## 2023-10-05 PROCEDURE — 97016 VASOPNEUMATIC DEVICE THERAPY: CPT

## 2023-10-05 NOTE — FLOWSHEET NOTE
No  Location: L  hip  Pain Rating: (0-10 scale) 7/10  Pain altered Tx:  [x] No  [] Yes  Action:  Comments:  Patient reports he felt okay after eval.  Notes stiffness and pain in the L hip. Occasional pain down the L leg. Objective:  Modalities: Vaso compression L hip Mod pressure @38 deg x10 min  Precautions:  Exercises:  Exercise Reps/ Time Weight/ Level Comments   SciFit 5' Level 3          Standing   Added 10.5   Incline stretch 3x20\"     Heel raises 10x     Hip abd 10x2     Marches 10x2  Quick stance on L         Seated      Hamstring stretch 3x20\"     LAQ 15x      Marches 15x                 Supine      LTR 10x     Bent knee fall out 10x 5 sec     Bridge 10x  difficult   Hip IR  add           Other:      Treatment Charges: Mins Units   []  Modalities     [x]  Ther Exercise 33 2   []  Manual Therapy     []  Ther Activities     []  Neuro Re-ed     [x]  Vasocompression 10 1   [] Gait     []  Other     Total Treatment time 43 3       Assessment: [x] Progressing toward goals. Introduced standing exercises to increase LE strength with quicker stance on L hip. Hamstring and hip flexor stretch added to increase flexibility of hip joint. Difficulty performing bridge, added cues for glute activation prior to bridging. Fair tolerance due to back pain. Ended with vaso compression. [] No change. [] Other:  [x] Patient would continue to benefit from skilled physical therapy services in order to: : Increase ROM needed for donning/doffing socks and shoes, increase hip girdle strength needed for ambulation, increase core stability needed for postural corrections, and to decrease LBP and L hip pain needed for improved quality of life. Problem list, as detailed above:   [x] ? Back Pain:                         [] ? Cervical Pain:        [x] ? ROM:                     [x] ? Strength:    [x] ? Function:  [x] Postural Deviations  [] Gait Deviations  [] Other:              STG: (to be met in 10 treatments)  ?

## 2023-10-06 DIAGNOSIS — I10 ESSENTIAL HYPERTENSION: ICD-10-CM

## 2023-10-09 ENCOUNTER — HOSPITAL ENCOUNTER (OUTPATIENT)
Dept: PHYSICAL THERAPY | Age: 63
Setting detail: THERAPIES SERIES
Discharge: HOME | End: 2023-10-09
Payer: MEDICARE

## 2023-10-09 PROCEDURE — 97110 THERAPEUTIC EXERCISES: CPT

## 2023-10-09 RX ORDER — HYDROCHLOROTHIAZIDE 50 MG/1
50 TABLET ORAL DAILY
Qty: 30 TABLET | Refills: 10 | Status: SHIPPED | OUTPATIENT
Start: 2023-10-09

## 2023-10-09 NOTE — TELEPHONE ENCOUNTER
Last visit: 09/26/2023  Last Med refill: 10/02/2023  Does patient have enough medication for 72 hours: No:     Pharmacy is requesting 90 day supply    Next Visit Date:  Future Appointments   Date Time Provider [address]   10/9/2023  1:00 PM Sam Oppenheim, PTA [facility]   10/12/2023  1:00 PM Sam Oppenheim, PTA [facility]   11/7/2023  9:00 AM Thiago Lopez MD [address] [place] [address],[place] [address] Maintenance   Topic Date Due    COVID-19 Vaccine (1) Never done    Shingles vaccine (1 of 2) Never done    Diabetic retinal exam  03/30/2016    Pneumococcal 0-64 years Vaccine (2 - PCV) 06/16/2018    Diabetic foot exam  09/18/2018    Hepatitis B vaccine (1 of 3 - Risk 3-dose series) Never done    Annual Wellness Visit (AWV)  07/19/2023    Flu vaccine (1) Never done    Diabetic Alb to Cr ratio (uACR) test  03/06/2024    Lipids  03/06/2024    GFR test (Diabetes, CKD 3-4, OR last GFR 15-59)  03/06/2024    A1C test (Diabetic or Prediabetic)  05/09/2024    Depression Screen  06/13/2024    Colorectal Cancer Screen  08/25/2024    DTaP/Tdap/Td vaccine (2 - Td or Tdap) 06/16/2027    Hepatitis C screen  Addressed    HIV screen  Addressed    Hepatitis A vaccine  Aged Out    Hib vaccine  Aged Out    Meningococcal (ACWY) vaccine  Aged Out       Hemoglobin A1C (%)   Date Value   05/09/2023 7.1   02/20/2023 9.8   09/01/2022 6.9             ( goal A1C is < 7)   No components found for: \"LABMICR\"  LDL Cholesterol (mg/dL)   Date Value   03/06/2023 130   08/26/2021 52     LDL Calculated (mg/dL)   Date Value   07/24/2013 120       (goal LDL is <100)   AST (U/L)   Date Value   08/26/2021 10     ALT (U/L)   Date Value   08/26/2021 10     BUN (mg/dL)   Date Value   03/06/2023 20     BP Readings from Last 3 Encounters:   09/26/23 (!) 142/78   07/24/23 (!) 145/80   07/11/23 135/85          (goal 120/80)    All Future Testing planned in [facility]  Lab Frequency Next Occurrence   VL ARTERIAL PVR LOWER WO EXERCISE Once 06/30/2023

## 2023-10-09 NOTE — FLOWSHEET NOTE
[x] [address]  [address] [facility] [place] [facility].  [phone]  F: [phone] [] [address] [facility]  [address]   [address]  P: [phone]  F: [phone] [] [address]  [address] [facility]  P: [phone]  F: [phone] [] [place]: [phone]  F: [phone] [] [address] [facility]  [address] [facility]   [address]   P: [phone]  F: [phone]  [] [address] [facility].   P: [phone]  F: [phone] [] [address] [facility]  [address] [facility] [address]  P: [phone]  F: [phone] [] 224 [facility]  [address] [facility]  [place]: [phone]  F: [phone] [] [address] [facility] [address]  [place]: [phone]  F: [phone]      Physical Therapy Daily Treatment Note    Date:  10/9/2023  Patient Name:  Tenna Osgood    :  1960  MRN: [id number]  Physician: PCP: Marbella Uribe MD;  Paige Estrada MD, Lance Whittaker MD                         Insurance: Primary Ins:   [facility];  Secondary Ins:   [place]  Medical Diagnosis: Primary osteoarthritis of left hip (M16.12 [ICD-10-CM]); Chronic bilateral low back pain without sciatica (M54.50,G89.29 [ICD-10-CM])                   Rehab Codes: M25.552, M25.652, M54.59, M79.652, M62.552,  Onset Date: 2023                    Next Dr.'s appt.: 2023  Visit# / total visits: 3/18; Progress note for Medicare patient due at visit 10     Cancels/No Shows: 0/0    Subjective:    Pain:  [x] Yes  []

## 2023-10-09 NOTE — TELEPHONE ENCOUNTER
Last visit: 09/29/2023  Last Med refill: 09/12/2023  Does patient have/ enough medication for 72 hours: NO    Next Visit Date:  Future Appointments   Date Time Provider [address]   10/9/2023  1:00 PM Godfrey Allison, PTA [facility]   10/12/2023  1:00 PM Godfrey Allison, PTA [facility]   11/7/2023  9:00 AM Corwin Lopez MD [address] [place] [address],[place] [address] Maintenance   Topic Date Due    COVID-19 Vaccine (1) Never done    Shingles vaccine (1 of 2) Never done    Diabetic retinal exam  03/30/2016    Pneumococcal 0-64 years Vaccine (2 - PCV) 06/16/2018    Diabetic foot exam  09/18/2018    Hepatitis B vaccine (1 of 3 - Risk 3-dose series) Never done    Annual Wellness Visit (AWV)  07/19/2023    Flu vaccine (1) Never done    Diabetic Alb to Cr ratio (uACR) test  03/06/2024    Lipids  03/06/2024    GFR test (Diabetes, CKD 3-4, OR last GFR 15-59)  03/06/2024    A1C test (Diabetic or Prediabetic)  05/09/2024    Depression Screen  06/13/2024    Colorectal Cancer Screen  08/25/2024    DTaP/Tdap/Td vaccine (2 - Td or Tdap) 06/16/2027    Hepatitis C screen  Addressed    HIV screen  Addressed    Hepatitis A vaccine  Aged Out    Hib vaccine  Aged Out    Meningococcal (ACWY) vaccine  Aged Out       Hemoglobin A1C (%)   Date Value   05/09/2023 7.1   02/20/2023 9.8   09/01/2022 6.9             ( goal A1C is < 7)   No components found for: \"LABMICR\"  LDL Cholesterol (mg/dL)   Date Value   03/06/2023 130   08/26/2021 52     LDL Calculated (mg/dL)   Date Value   07/24/2013 120       (goal LDL is <100)   AST (U/L)   Date Value   08/26/2021 10     ALT (U/L)   Date Value   08/26/2021 10     BUN (mg/dL)   Date Value   03/06/2023 20     BP Readings from Last 3 Encounters:   09/26/23 (!) 142/78   07/24/23 (!) 145/80   07/11/23 135/85          (goal 120/80)    All Future Testing planned in [facility]  Lab Frequency Next Occurrence   VL ARTERIAL PVR LOWER WO EXERCISE Once 06/30/2023   VL DUP LOWER EXTREMITY ARTERIES

## 2023-10-12 ENCOUNTER — HOSPITAL ENCOUNTER (OUTPATIENT)
Dept: PHYSICAL THERAPY | Age: 63
Setting detail: THERAPIES SERIES
Discharge: HOME | End: 2023-10-12
Payer: MEDICARE

## 2023-10-12 NOTE — FLOWSHEET NOTE
[x] [address]  4600 [facility].  [phone]  F: [phone] [] 204 [facility]  [address]   [address]  P: [phone]  F: [phone] [] [address]  [address] [facility]  P: [phone]  F: [phone] [] [facility] [place]: [phone]  F: [phone] [] 224 [facility]  One [facility]   [address]   P: [phone]  F: [phone]  [] [address] [facility].   P: [phone]  F: [phone] [] 205 [facility]  [address] [facility]. [address]  P: [phone]  F: [phone] [] 224 [facility]  [address] [facility]  [place]: [phone]  F: [phone] [] [address]  [place]: [phone]  F: [phone]      Therapy Cancel/No Show note    Date: 10/12/2023  Patient: Sita Johnson  : 1960  MRN: [id number]    Cancels/No Shows to date:     For today's appointment patient:    [x]  Cancelled    [] Rescheduled appointment    [] No-show     Reason given by patient:    []  Patient ill    []  Conflicting appointment    [] No transportation      [] Conflict with work    [x] No reason given    [] Weather related    [] [id number]    [x] Other:      Comments:  Will r/s another time       [x] Next appointment was confirmed    Electronically signed by: Sarita Sorenson PTA

## 2023-10-13 RX ORDER — CLOPIDOGREL BISULFATE 75 MG/1
TABLET ORAL
Qty: 30 TABLET | Refills: 0 | Status: SHIPPED | OUTPATIENT
Start: 2023-10-13 | End: 2023-11-11 | Stop reason: SDUPTHER

## 2023-10-19 ENCOUNTER — HOSPITAL ENCOUNTER (OUTPATIENT)
Dept: PHYSICAL THERAPY | Age: 63
Setting detail: THERAPIES SERIES
Discharge: HOME | End: 2023-10-19
Payer: MEDICARE

## 2023-10-26 ENCOUNTER — HOSPITAL ENCOUNTER (OUTPATIENT)
Dept: PHYSICAL THERAPY | Age: 63
Setting detail: THERAPIES SERIES
End: 2023-10-26
Payer: MEDICARE

## 2023-10-26 DIAGNOSIS — G89.29 CHRONIC LUMBOSACRAL PAIN: ICD-10-CM

## 2023-10-26 DIAGNOSIS — M54.50 CHRONIC LUMBOSACRAL PAIN: ICD-10-CM

## 2023-10-26 NOTE — TELEPHONE ENCOUNTER
Last visit: 09/26/2023  Last Med refill: 10/15/2022  Does patient have enough medication for 72 hours: No:     Next Visit Date:  Future Appointments   Date Time Provider [address]   11/7/2023  9:00 AM Khoa Atkins  [facility] [place]- Maintenance   Topic Date Due    COVID-19 Vaccine (1) Never done    Shingles vaccine (1 of 2) Never done    Diabetic retinal exam  03/30/2016    Pneumococcal 0-64 years Vaccine (2 - PCV) 06/16/2018    Diabetic foot exam  09/18/2018    Hepatitis B vaccine (1 of 3 - Risk 3-dose series) Never done    Annual Wellness Visit (AWV)  07/19/2023    Flu vaccine (1) Never done    Diabetic Alb to Cr ratio (uACR) test  03/06/2024    Lipids  03/06/2024    GFR test (Diabetes, CKD 3-4, OR last GFR 15-59)  03/06/2024    A1C test (Diabetic or Prediabetic)  05/09/2024    Depression Screen  06/13/2024    Colorectal Cancer Screen  08/25/2024    DTaP/Tdap/Td vaccine (2 - Td or Tdap) 06/16/2027    Hepatitis C screen  Addressed    HIV screen  Addressed    Hepatitis A vaccine  Aged Out    Hib vaccine  Aged Out    Meningococcal (ACWY) vaccine  Aged Out       Hemoglobin A1C (%)   Date Value   05/09/2023 7.1   02/20/2023 9.8   09/01/2022 6.9             ( goal A1C is < 7)   No components found for: \"LABMICR\"  LDL Cholesterol (mg/dL)   Date Value   03/06/2023 130   08/26/2021 52     LDL Calculated (mg/dL)   Date Value   07/24/2013 120       (goal LDL is <100)   AST (U/L)   Date Value   08/26/2021 10     ALT (U/L)   Date Value   08/26/2021 10     BUN (mg/dL)   Date Value   03/06/2023 20     BP Readings from Last 3 Encounters:   09/26/23 (!) 142/78   07/24/23 (!) 145/80   07/11/23 135/85          (goal 120/80)    All Future Testing planned in [facility]  Lab Frequency Next Occurrence   VL ARTERIAL PVR LOWER WO EXERCISE Once 06/30/2023   VL DUP LOWER EXTREMITY ARTERIES BILATERAL Once 06/30/2023   Hemoglobin A1C Once 04/21/2023               Patient Active Problem List:     HLD (hyperlipidemia)

## 2023-10-27 RX ORDER — LIDOCAINE 50 MG/G
PATCH TOPICAL
Qty: 30 PATCH | Refills: 1 | OUTPATIENT
Start: 2023-10-27

## 2023-10-31 ENCOUNTER — APPOINTMENT (OUTPATIENT)
Dept: PHYSICAL THERAPY | Age: 63
End: 2023-10-31
Payer: MEDICARE

## 2023-11-07 RX ORDER — CLOPIDOGREL BISULFATE 75 MG/1
TABLET ORAL
Qty: 30 TABLET | Refills: 10 | OUTPATIENT
Start: 2023-11-07

## 2023-11-07 NOTE — TELEPHONE ENCOUNTER
Last visit: 09/26/2023  Last Med refill: 10/13/2023  Does patient have enough medication for 72 hours: No:     Next Visit Date:  No future appointments.     Health Maintenance   Topic Date Due    COVID-19 Vaccine (1) Never done    Shingles vaccine (1 of 2) Never done    Diabetic retinal exam  03/30/2016    Pneumococcal 0-64 years Vaccine (2 - PCV) 06/16/2018    Diabetic foot exam  09/18/2018    Hepatitis B vaccine (1 of 3 - Risk 3-dose series) Never done    Annual Wellness Visit (AWV)  07/19/2023    Flu vaccine (1) Never done    Diabetic Alb to Cr ratio (uACR) test  03/06/2024    Lipids  03/06/2024    GFR test (Diabetes, CKD 3-4, OR last GFR 15-59)  03/06/2024    A1C test (Diabetic or Prediabetic)  05/09/2024    Depression Screen  06/13/2024    Colorectal Cancer Screen  08/25/2024    DTaP/Tdap/Td vaccine (2 - Td or Tdap) 06/16/2027    Hepatitis C screen  Addressed    HIV screen  Addressed    Hepatitis A vaccine  Aged Out    Hib vaccine  Aged Out    Meningococcal (ACWY) vaccine  Aged Out       Hemoglobin A1C (%)   Date Value   05/09/2023 7.1   02/20/2023 9.8   09/01/2022 6.9             ( goal A1C is < 7)   No components found for: \"LABMICR\"  LDL Cholesterol (mg/dL)   Date Value   03/06/2023 130   08/26/2021 52     LDL Calculated (mg/dL)   Date Value   07/24/2013 120       (goal LDL is <100)   AST (U/L)   Date Value   08/26/2021 10     ALT (U/L)   Date Value   08/26/2021 10     BUN (mg/dL)   Date Value   03/06/2023 20     BP Readings from Last 3 Encounters:   09/26/23 (!) 142/78   07/24/23 (!) 145/80   07/11/23 135/85          (goal 120/80)    All Future Testing planned in [facility]  Lab Frequency Next Occurrence   VL ARTERIAL PVR LOWER WO EXERCISE Once 06/30/2023   VL DUP LOWER EXTREMITY ARTERIES BILATERAL Once 06/30/2023   Hemoglobin A1C Once 04/21/2023               Patient Active Problem List:     HLD (hyperlipidemia)     Essential hypertension     DM (diabetes mellitus) ([address])     Chronic back pain     Thoracic

## 2023-11-11 RX ORDER — CLOPIDOGREL BISULFATE 75 MG/1
TABLET ORAL
Qty: 30 TABLET | Refills: 0 | Status: SHIPPED | OUTPATIENT
Start: 2023-11-11

## 2023-11-11 RX ORDER — HYDRALAZINE HYDROCHLORIDE 10 MG/1
10 TABLET, FILM COATED ORAL 3 TIMES DAILY
Qty: 90 TABLET | Refills: 10 | Status: SHIPPED | OUTPATIENT
Start: 2023-11-11

## 2023-11-13 RX ORDER — HYDRALAZINE HYDROCHLORIDE 10 MG/1
10 TABLET, FILM COATED ORAL 3 TIMES DAILY
Qty: 90 TABLET | Refills: 10 | OUTPATIENT
Start: 2023-11-13

## 2023-12-08 NOTE — TELEPHONE ENCOUNTER
ARTERIES BILATERAL Once 06/30/2023   Hemoglobin A1C Once 04/21/2023               Patient Active Problem List:     HLD (hyperlipidemia)     Essential hypertension     DM (diabetes mellitus) ([address])     Chronic back pain     Thoracic aortic aneurysm diagnosed  2010?  at [facility]     Acute pain of right shoulder     Other male erectile dysfunction     Poorly controlled diabetes mellitus ([address])     Long term (current) use of antithrombotics/antiplatelets     Chronic bilateral low back pain without sciatica     Atheroscler of native artery of both legs with intermit claudication (HCC)     Sprain of collateral ligament of interphalangeal joint of great toe     Diastasis recti     Primary osteoarthritis of left hip     Flatulence     Ventral hernia without obstruction or gangrene [phone]

## 2023-12-13 RX ORDER — CLOPIDOGREL BISULFATE 75 MG/1
TABLET ORAL
Qty: 30 TABLET | Refills: 0 | Status: SHIPPED | OUTPATIENT
Start: 2023-12-13

## 2023-12-15 RX ORDER — CLOPIDOGREL BISULFATE 75 MG/1
TABLET ORAL
Qty: 30 TABLET | Refills: 10 | OUTPATIENT
Start: 2023-12-15

## 2024-01-09 RX ORDER — CLOPIDOGREL BISULFATE 75 MG/1
TABLET ORAL
Qty: 30 TABLET | Refills: 0 | Status: SHIPPED | OUTPATIENT
Start: 2024-01-09

## 2024-01-09 NOTE — TELEPHONE ENCOUNTER
Last visit: 09/26/2023  Last Med refill: 12/13/2023  Does patient have enough medication for 72 hours: Yes    Next Visit Date:  No future appointments.    Health Maintenance   Topic Date Due    COVID-19 Vaccine (1) Never done    Shingles vaccine (1 of 2) Never done    Diabetic retinal exam  03/30/2016    Pneumococcal 0-64 years Vaccine (2 - PCV) 06/16/2018    Diabetic foot exam  09/18/2018    Respiratory Syncytial Virus (RSV) Pregnant or age 60 yrs+ (1 - 1-dose 60+ series) Never done    Flu vaccine (1) Never done    Annual Wellness Visit (Medicare Advantage)  01/01/2024    Diabetic Alb to Cr ratio (uACR) test  03/06/2024    Lipids  03/06/2024    GFR test (Diabetes, CKD 3-4, OR last GFR 15-59)  03/06/2024    A1C test (Diabetic or Prediabetic)  05/09/2024    Depression Screen  06/13/2024    Colorectal Cancer Screen  08/25/2024    DTaP/Tdap/Td vaccine (2 - Td or Tdap) 06/16/2027    Hepatitis C screen  Addressed    HIV screen  Addressed    Hepatitis A vaccine  Aged Out    Hepatitis B vaccine  Aged Out    Hib vaccine  Aged Out    Polio vaccine  Aged Out    Meningococcal (ACWY) vaccine  Aged Out       Hemoglobin A1C (%)   Date Value   05/09/2023 7.1   02/20/2023 9.8   09/01/2022 6.9             ( goal A1C is < 7)   No components found for: \"LABMICR\"  LDL Cholesterol (mg/dL)   Date Value   03/06/2023 130   08/26/2021 52     LDL Calculated (mg/dL)   Date Value   07/24/2013 120       (goal LDL is <100)   AST (U/L)   Date Value   08/26/2021 10     ALT (U/L)   Date Value   08/26/2021 10     BUN (mg/dL)   Date Value   03/06/2023 20     BP Readings from Last 3 Encounters:   09/26/23 (!) 142/78   07/24/23 (!) 145/80   07/11/23 135/85          (goal 120/80)    All Future Testing planned in [facility]  Lab Frequency Next Occurrence   Hemoglobin A1C Once 04/21/2023               Patient Active Problem List:     HLD (hyperlipidemia)     Essential hypertension     DM (diabetes mellitus) (HCC)     Chronic back pain     Thoracic aortic

## 2024-02-07 RX ORDER — CLOPIDOGREL BISULFATE 75 MG/1
TABLET ORAL
Qty: 30 TABLET | Refills: 2 | Status: SHIPPED | OUTPATIENT
Start: 2024-02-07

## 2024-02-07 NOTE — TELEPHONE ENCOUNTER
E-scribe request for plavix. Please review and e-scribe if applicable.     Last Visit Date:  9/26  Next Visit Date:  Visit date not found    Hemoglobin A1C (%)   Date Value   05/09/2023 7.1   02/20/2023 9.8   09/01/2022 6.9             ( goal A1C is < 7)   No components found for: \"LABMICR\"  LDL Cholesterol (mg/dL)   Date Value   03/06/2023 130     LDL Calculated (mg/dL)   Date Value   07/24/2013 120       (goal LDL is <100)   AST (U/L)   Date Value   08/26/2021 10     ALT (U/L)   Date Value   08/26/2021 10     BUN (mg/dL)   Date Value   03/06/2023 20     BP Readings from Last 3 Encounters:   09/26/23 (!) 142/78   07/24/23 (!) 145/80   07/11/23 135/85          (goal 120/80)        Patient Active Problem List:     HLD (hyperlipidemia)     Essential hypertension     DM (diabetes mellitus) (HCC)     Chronic back pain     Thoracic aortic aneurysm diagnosed  2010? at [facility]     Acute pain of right shoulder     Other male erectile dysfunction     Poorly controlled diabetes mellitus (HCC)     Long term (current) use of antithrombotics/antiplatelets     Chronic bilateral low back pain without sciatica     Atheroscler of native artery of both legs with intermit claudication (HCC)     Sprain of collateral ligament of interphalangeal joint of great toe     Diastasis recti     Primary osteoarthritis of left hip     Flatulence     Ventral hernia without obstruction or gangrene

## 2024-04-11 ENCOUNTER — HOSPITAL ENCOUNTER (OUTPATIENT)
Age: 64
Setting detail: SPECIMEN
Discharge: HOME | End: 2024-04-11

## 2024-04-11 ENCOUNTER — OFFICE VISIT (OUTPATIENT)
Dept: FAMILY MEDICINE CLINIC | Age: 64
End: 2024-04-11

## 2024-04-11 VITALS
DIASTOLIC BLOOD PRESSURE: 68 MMHG | HEIGHT: 68 IN | BODY MASS INDEX: 27.83 KG/M2 | SYSTOLIC BLOOD PRESSURE: 109 MMHG | HEART RATE: 62 BPM | WEIGHT: 183.6 LBS

## 2024-04-11 DIAGNOSIS — M54.50 CHRONIC BILATERAL LOW BACK PAIN WITHOUT SCIATICA: Primary | ICD-10-CM

## 2024-04-11 DIAGNOSIS — E11.9 TYPE 2 DIABETES MELLITUS WITHOUT COMPLICATION, WITHOUT LONG-TERM CURRENT USE OF INSULIN (HCC): ICD-10-CM

## 2024-04-11 DIAGNOSIS — Z13.220 SCREENING FOR HYPERLIPIDEMIA: ICD-10-CM

## 2024-04-11 DIAGNOSIS — G89.29 CHRONIC BILATERAL LOW BACK PAIN WITHOUT SCIATICA: Primary | ICD-10-CM

## 2024-04-11 LAB
ANION GAP SERPL CALCULATED.3IONS-SCNC: 14 MMOL/L (ref 9–16)
BUN SERPL-MCNC: 29 MG/DL (ref 8–23)
CALCIUM SERPL-MCNC: 9.8 MG/DL (ref 8.6–10.4)
CHLORIDE SERPL-SCNC: 102 MMOL/L (ref 98–107)
CHOLEST SERPL-MCNC: 176 MG/DL (ref 0–199)
CHOLESTEROL/HDL RATIO: 5
CO2 SERPL-SCNC: 25 MMOL/L (ref 20–31)
CREAT SERPL-MCNC: 1.5 MG/DL (ref 0.7–1.2)
CREAT UR-MCNC: 91.3 MG/DL (ref 39–259)
EST. AVERAGE GLUCOSE BLD GHB EST-MCNC: 126 MG/DL
GFR SERPL CREATININE-BSD FRML MDRD: 55 ML/MIN/1.73M2
GLUCOSE SERPL-MCNC: 165 MG/DL (ref 74–99)
HBA1C MFR BLD: 6 % (ref 4–6)
HDLC SERPL-MCNC: 39 MG/DL
LDLC SERPL CALC-MCNC: 109 MG/DL (ref 0–100)
MICROALBUMIN UR-MCNC: 90 MG/L (ref 0–20)
MICROALBUMIN/CREAT UR-RTO: 99 MCG/MG CREAT (ref 0–17)
POTASSIUM SERPL-SCNC: 3.8 MMOL/L (ref 3.7–5.3)
SODIUM SERPL-SCNC: 141 MMOL/L (ref 136–145)
TRIGL SERPL-MCNC: 141 MG/DL
VLDLC SERPL CALC-MCNC: 28 MG/DL

## 2024-04-11 RX ORDER — LIDOCAINE 50 MG/G
1 PATCH TOPICAL DAILY
Qty: 30 PATCH | Refills: 0 | Status: SHIPPED | OUTPATIENT
Start: 2024-04-11 | End: 2024-05-11

## 2024-04-11 RX ORDER — CYCLOBENZAPRINE HCL 10 MG
10 TABLET ORAL NIGHTLY PRN
Qty: 7 TABLET | Refills: 0 | Status: SHIPPED | OUTPATIENT
Start: 2024-04-11 | End: 2024-04-21

## 2024-04-11 RX ORDER — IBUPROFEN 600 MG/1
600 TABLET ORAL 3 TIMES DAILY PRN
Qty: 30 TABLET | Refills: 0 | Status: CANCELLED | OUTPATIENT
Start: 2024-04-11

## 2024-04-11 SDOH — ECONOMIC STABILITY: FOOD INSECURITY: WITHIN THE PAST 12 MONTHS, YOU WORRIED THAT YOUR FOOD WOULD RUN OUT BEFORE YOU GOT MONEY TO BUY MORE.: NEVER TRUE

## 2024-04-11 SDOH — ECONOMIC STABILITY: INCOME INSECURITY: HOW HARD IS IT FOR YOU TO PAY FOR THE VERY BASICS LIKE FOOD, HOUSING, MEDICAL CARE, AND HEATING?: NOT HARD AT ALL

## 2024-04-11 SDOH — ECONOMIC STABILITY: FOOD INSECURITY: WITHIN THE PAST 12 MONTHS, THE FOOD YOU BOUGHT JUST DIDN'T LAST AND YOU DIDN'T HAVE MONEY TO GET MORE.: NEVER TRUE

## 2024-04-11 ASSESSMENT — ENCOUNTER SYMPTOMS
BACK PAIN: 1
SHORTNESS OF BREATH: 0
NAUSEA: 0
VOMITING: 0
DIARRHEA: 0
ABDOMINAL PAIN: 0
CONSTIPATION: 0

## 2024-04-11 ASSESSMENT — PATIENT HEALTH QUESTIONNAIRE - PHQ9
SUM OF ALL RESPONSES TO PHQ QUESTIONS 1-9: 0
DEPRESSION UNABLE TO ASSESS: PT REFUSES
2. FEELING DOWN, DEPRESSED OR HOPELESS: NOT AT ALL
1. LITTLE INTEREST OR PLEASURE IN DOING THINGS: NOT AT ALL
SUM OF ALL RESPONSES TO PHQ QUESTIONS 1-9: 0
SUM OF ALL RESPONSES TO PHQ9 QUESTIONS 1 & 2: 0

## 2024-04-11 NOTE — PATIENT INSTRUCTIONS
Thank you for letting us take care of you today. We hope all your questions were addressed. If a question was overlooked or something else comes to mind after you return home, please contact a member of your Care Team listed below.      Your Care Team at [facility] is Team #  Oliver Donaldson, (Faculty)  Rohini Soria (Resident)  Rohini Longo (Resident)   Ryan Lopez (Resident)  Brie Lucio (Resident)  Aminata Laurent., [facility]  Kaya Yan., [facility]  Barb Carrillo, [facility]  Ariana Blackwell, [facility]  Zak Iglesias, [facility]  Dorina Hensley, [facility]  Kristy Sanchez, [facility]  Sonia Buckley, LEAH Gabriel (LJ) KAYY Qureshi (Clinical Practice Manager)  Ekaterina Sutton [facility] (Clinical Pharmacist)     Office phone number: [phone]    If you need to get in right away due to illness, please be advised we have \"Same Day\" appointments available Monday-Friday. Please call us at [phone] option #3 to schedule your \"Same Day\" appointment.

## 2024-04-11 NOTE — PROGRESS NOTES
Attending Physician Statement  I have discussed the care of Weston Harkins, 63 y.o. male,including pertinent history and exam findings,  with the resident Deondre Toussaint MD.  History:  Chief Complaint   Patient presents with    Back Pain     Lower Back pain, started a couple months ago      I have reviewed the key elements of the encounter with the resident. Examination was done by resident as documented in residents note.  BP Readings from Last 3 Encounters:   04/11/24 109/68   09/26/23 (!) 142/78   07/24/23 (!) 145/80     /68 (Site: Right Upper Arm, Position: Sitting, Cuff Size: Medium Adult)   Pulse 62   Ht 1.727 m (5' 7.99\")   Wt 83.3 kg (183 lb 9.6 oz)   BMI 27.92 kg/m²   Lab Results   Component Value Date    WBC 7.4 02/24/2018    HGB 11.5 (L) 02/24/2018    HCT 37.7 (L) 02/24/2018     02/24/2018    CHOL 231 (H) 03/06/2023    TRIG 279 (H) 03/06/2023    HDL 45 03/06/2023    ALT 10 08/26/2021    AST 10 08/26/2021     03/06/2023    K 4.1 03/06/2023     03/06/2023    CREATININE 1.28 (H) 03/06/2023    BUN 20 03/06/2023    CO2 26 03/06/2023    LABA1C 7.1 05/09/2023     Lab Results   Component Value Date    CALCIUM 9.3 03/06/2023     Lab Results   Component Value Date    LDLCALC 120 07/24/2013    LDLCHOLESTEROL 130 03/06/2023     I agree with the assessment, plan and diagnosis of    Diagnosis Orders   1. Chronic bilateral low back pain without sciatica  XR LUMBAR SPINE (2-3 VIEWS)    [facility] - [facility]    lidocaine (LIDODERM) 5 %    cyclobenzaprine (FLEXERIL) 10 MG tablet      2. Type 2 diabetes mellitus without complication, without long-term current use of insulin (HCC)  Basic Metabolic Panel    Hemoglobin A1C    Microalbumin, Ur      3. Screening for hyperlipidemia  Lipid Panel        I agree with  orders as documented by the resident.  Recommendations: Agree with resident assessment and plan.  Return in about 6 weeks (around 5/23/2024) for Follow-up on back pain. 
Hemoglobin A1C; Future  - Microalbumin, Ur; Future    3. Screening for hyperlipidemia    - Lipid Panel; Future          Requested Prescriptions     Signed Prescriptions Disp Refills    lidocaine (LIDODERM) 5 % 30 patch 0     Sig: Place 1 patch onto the skin daily 12 hours on, 12 hours off.    cyclobenzaprine (FLEXERIL) 10 MG tablet 7 tablet 0     Sig: Take 1 tablet by mouth nightly as needed for Muscle spasms       Medications Discontinued During This Encounter   Medication Reason    cyclobenzaprine (FLEXERIL) 10 MG tablet LIST CLEANUP       Weston received counseling on the following healthy behaviors: nutrition, exercise and medication adherence    Discussed use,benefit, and side effects of prescribed medications.  Barriers to medication compliance addressed.      All patient questions answered.  Pt voiced understanding.     Return in about 6 weeks (around 5/23/2024) for Follow-up on back pain.        Disclaimer: Some orall of this note was transcribed using voice-recognition software.This may cause typographical errors occasionally. Although all effort is made to fix these errors, please do not hesitate to contact our office if there isany concern with the understanding of this note.

## 2024-04-19 ENCOUNTER — HOSPITAL ENCOUNTER (OUTPATIENT)
Age: 64
End: 2024-04-19
Payer: MEDICARE

## 2024-04-19 ENCOUNTER — HOSPITAL ENCOUNTER (OUTPATIENT)
Dept: GENERAL RADIOLOGY | Age: 64
End: 2024-04-19
Payer: MEDICARE

## 2024-04-19 DIAGNOSIS — G89.29 CHRONIC BILATERAL LOW BACK PAIN WITHOUT SCIATICA: ICD-10-CM

## 2024-04-19 DIAGNOSIS — M54.50 CHRONIC BILATERAL LOW BACK PAIN WITHOUT SCIATICA: ICD-10-CM

## 2024-04-19 PROCEDURE — 72100 X-RAY EXAM L-S SPINE 2/3 VWS: CPT

## 2024-04-23 DIAGNOSIS — M54.50 CHRONIC BILATERAL LOW BACK PAIN WITHOUT SCIATICA: ICD-10-CM

## 2024-04-23 DIAGNOSIS — G89.29 CHRONIC BILATERAL LOW BACK PAIN WITHOUT SCIATICA: ICD-10-CM

## 2024-04-24 NOTE — TELEPHONE ENCOUNTER
E-scribe request for flexeril. Please review and e-scribe if applicable.     Last Visit Date:  4/11/24  Next Visit Date:  6/3/2024    Hemoglobin A1C (%)   Date Value   04/11/2024 6.0   05/09/2023 7.1   02/20/2023 9.8             ( goal A1C is < 7)   No components found for: \"LABMICR\"  LDL Cholesterol (mg/dL)   Date Value   04/11/2024 109 (H)     LDL Calculated (mg/dL)   Date Value   07/24/2013 120       (goal LDL is <100)   AST (U/L)   Date Value   08/26/2021 10     ALT (U/L)   Date Value   08/26/2021 10     BUN (mg/dL)   Date Value   04/11/2024 29 (H)     BP Readings from Last 3 Encounters:   04/11/24 109/68   09/26/23 (!) 142/78   07/24/23 (!) 145/80          (goal 120/80)        Patient Active Problem List:     HLD (hyperlipidemia)     Essential hypertension     DM (diabetes mellitus) (HCC)     Chronic back pain     Thoracic aortic aneurysm diagnosed  2010? at [facility]     Acute pain of right shoulder     Other male erectile dysfunction     Poorly controlled diabetes mellitus (HCC)     Long term (current) use of antithrombotics/antiplatelets     Chronic bilateral low back pain without sciatica     Atheroscler of native artery of both legs with intermit claudication (HCC)     Sprain of collateral ligament of interphalangeal joint of great toe     Diastasis recti     Primary osteoarthritis of left hip     Flatulence     Ventral hernia without obstruction or gangrene

## 2024-04-29 DIAGNOSIS — M54.50 CHRONIC BILATERAL LOW BACK PAIN WITHOUT SCIATICA: ICD-10-CM

## 2024-04-29 DIAGNOSIS — G89.29 CHRONIC BILATERAL LOW BACK PAIN WITHOUT SCIATICA: ICD-10-CM

## 2024-04-29 RX ORDER — METOPROLOL TARTRATE 100 MG/1
TABLET ORAL
Qty: 60 TABLET | Refills: 3 | Status: SHIPPED | OUTPATIENT
Start: 2024-04-29

## 2024-04-29 RX ORDER — CYCLOBENZAPRINE HCL 10 MG
10 TABLET ORAL NIGHTLY PRN
Qty: 10 TABLET | Refills: 0 | Status: SHIPPED | OUTPATIENT
Start: 2024-04-29 | End: 2024-05-09

## 2024-04-29 NOTE — TELEPHONE ENCOUNTER
Problem List:     HLD (hyperlipidemia)     Essential hypertension     DM (diabetes mellitus) (HCC)     Chronic back pain     Thoracic aortic aneurysm diagnosed  2010? at [facility]     Acute pain of right shoulder     Other male erectile dysfunction     Poorly controlled diabetes mellitus (HCC)     Long term (current) use of antithrombotics/antiplatelets     Chronic bilateral low back pain without sciatica     Atheroscler of native artery of both legs with intermit claudication (HCC)     Sprain of collateral ligament of interphalangeal joint of great toe     Diastasis recti     Primary osteoarthritis of left hip     Flatulence     Ventral hernia without obstruction or gangrene

## 2024-04-30 RX ORDER — CYCLOBENZAPRINE HCL 10 MG
10 TABLET ORAL NIGHTLY PRN
Qty: 7 TABLET | Refills: 10 | OUTPATIENT
Start: 2024-04-30 | End: 2024-05-10

## 2024-05-20 DIAGNOSIS — G89.29 CHRONIC BILATERAL LOW BACK PAIN WITHOUT SCIATICA: ICD-10-CM

## 2024-05-20 DIAGNOSIS — M54.50 CHRONIC BILATERAL LOW BACK PAIN WITHOUT SCIATICA: ICD-10-CM

## 2024-05-21 RX ORDER — CYCLOBENZAPRINE HCL 10 MG
10 TABLET ORAL NIGHTLY PRN
Qty: 10 TABLET | Refills: 0 | Status: SHIPPED | OUTPATIENT
Start: 2024-05-21 | End: 2024-05-31

## 2024-05-21 RX ORDER — LISINOPRIL 40 MG/1
TABLET ORAL
Qty: 30 TABLET | Refills: 10 | Status: SHIPPED | OUTPATIENT
Start: 2024-05-21

## 2024-05-21 RX ORDER — GLIPIZIDE 10 MG/1
TABLET, FILM COATED, EXTENDED RELEASE ORAL
Qty: 60 TABLET | Refills: 10 | Status: SHIPPED | OUTPATIENT
Start: 2024-05-21

## 2024-05-21 NOTE — TELEPHONE ENCOUNTER
Last visit: 04/11/2024  Last Med refill: 06/17/2023-06/09/2023-05/09/2024  Does patient have enough medication for 72 hours: No:     Next Visit Date:  Future Appointments   Date Time Provider Department Center   6/4/2024  1:30 PM Deondre Lopez MD [id number]       Health Maintenance   Topic Date Due    COVID-19 Vaccine (1) Never done    Shingles vaccine (1 of 2) Never done    Diabetic retinal exam  03/30/2016    Pneumococcal 0-64 years Vaccine (2 of 2 - PCV) 06/16/2018    Diabetic foot exam  09/18/2018    Respiratory Syncytial Virus (RSV) Pregnant or age 60 yrs+ (1 - 1-dose 60+ series) Never done    Annual Wellness Visit (Medicare Advantage)  01/01/2024    Flu vaccine (Season Ended) 08/01/2024    Colorectal Cancer Screen  08/25/2024    A1C test (Diabetic or Prediabetic)  04/11/2025    Diabetic Alb to Cr ratio (uACR) test  04/11/2025    Lipids  04/11/2025    Depression Screen  04/11/2025    GFR test (Diabetes, CKD 3-4, OR last GFR 15-59)  04/11/2025    DTaP/Tdap/Td vaccine (2 - Td or Tdap) 06/16/2027    Hepatitis C screen  Addressed    HIV screen  Addressed    Hepatitis A vaccine  Aged Out    Hepatitis B vaccine  Aged Out    Hib vaccine  Aged Out    Polio vaccine  Aged Out    Meningococcal (ACWY) vaccine  Aged Out       Hemoglobin A1C (%)   Date Value   04/11/2024 6.0   05/09/2023 7.1   02/20/2023 9.8             ( goal A1C is < 7)   No components found for: \"LABMICR\"  No components found for: \"LDLCHOLESTEROL\", \"LDLCALC\"    (goal LDL is <100)   AST (U/L)   Date Value   08/26/2021 10     ALT (U/L)   Date Value   08/26/2021 10     BUN (mg/dL)   Date Value   04/11/2024 29 (H)     BP Readings from Last 3 Encounters:   04/11/24 109/68   09/26/23 (!) 142/78   07/24/23 (!) 145/80          (goal 120/80)    All Future Testing planned in [facility]  Lab Frequency Next Occurrence               Patient Active Problem List:     HLD (hyperlipidemia)     Essential hypertension     DM (diabetes mellitus) (HCC)     Chronic back

## 2024-05-22 RX ORDER — CLOPIDOGREL BISULFATE 75 MG/1
TABLET ORAL
Qty: 30 TABLET | Refills: 1 | Status: SHIPPED | OUTPATIENT
Start: 2024-05-22

## 2024-05-22 NOTE — TELEPHONE ENCOUNTER
E-scribe request for PLAVIX. Please review and e-scribe if applicable.     Last Visit Date:  4/11/24  Next Visit Date:  6/4/2024    Hemoglobin A1C (%)   Date Value   04/11/2024 6.0   05/09/2023 7.1   02/20/2023 9.8             ( goal A1C is < 7)   No components found for: \"LABMICR\"  No components found for: \"LDLCHOLESTEROL\", \"LDLCALC\"    (goal LDL is <100)   AST (U/L)   Date Value   08/26/2021 10     ALT (U/L)   Date Value   08/26/2021 10     BUN (mg/dL)   Date Value   04/11/2024 29 (H)     BP Readings from Last 3 Encounters:   04/11/24 109/68   09/26/23 (!) 142/78   07/24/23 (!) 145/80          (goal 120/80)        Patient Active Problem List:     HLD (hyperlipidemia)     Essential hypertension     DM (diabetes mellitus) (HCC)     Chronic back pain     Thoracic aortic aneurysm diagnosed  2010? at [facility]     Acute pain of right shoulder     Other male erectile dysfunction     Poorly controlled diabetes mellitus (HCC)     Long term (current) use of antithrombotics/antiplatelets     Chronic bilateral low back pain without sciatica     Atheroscler of native artery of both legs with intermit claudication (HCC)     Sprain of collateral ligament of interphalangeal joint of great toe     Diastasis recti     Primary osteoarthritis of left hip     Flatulence     Ventral hernia without obstruction or gangrene      ----JF

## 2024-06-17 ENCOUNTER — ENROLLMENT (OUTPATIENT)
Dept: PHARMACY | Facility: CLINIC | Age: 64
End: 2024-06-17

## 2024-06-19 DIAGNOSIS — M54.50 CHRONIC BILATERAL LOW BACK PAIN WITHOUT SCIATICA: ICD-10-CM

## 2024-06-19 DIAGNOSIS — I10 ESSENTIAL HYPERTENSION: ICD-10-CM

## 2024-06-19 DIAGNOSIS — G89.29 CHRONIC BILATERAL LOW BACK PAIN WITHOUT SCIATICA: ICD-10-CM

## 2024-06-20 RX ORDER — AMLODIPINE BESYLATE 10 MG/1
TABLET ORAL
Qty: 30 TABLET | Refills: 10 | Status: SHIPPED | OUTPATIENT
Start: 2024-06-20

## 2024-06-20 RX ORDER — CYCLOBENZAPRINE HCL 10 MG
10 TABLET ORAL NIGHTLY PRN
Qty: 10 TABLET | Refills: 0 | Status: SHIPPED | OUTPATIENT
Start: 2024-06-20 | End: 2024-06-30

## 2024-06-20 NOTE — TELEPHONE ENCOUNTER
Last visit: 04/11/2024  Last Med refill: 07/11/2023-07/17/2023  Does patient have enough medication for 72 hours: No:     Next Visit Date:  No future appointments.    Health Maintenance   Topic Date Due    COVID-19 Vaccine (1) Never done    Shingles vaccine (1 of 2) Never done    Diabetic retinal exam  03/30/2016    Pneumococcal 0-64 years Vaccine (2 of 2 - PCV) 06/16/2018    Diabetic foot exam  09/18/2018    Respiratory Syncytial Virus (RSV) Pregnant or age 60 yrs+ (1 - 1-dose 60+ series) Never done    Annual Wellness Visit (Medicare Advantage)  01/01/2024    Flu vaccine (Season Ended) 08/01/2024    Colorectal Cancer Screen  08/25/2024    A1C test (Diabetic or Prediabetic)  04/11/2025    Diabetic Alb to Cr ratio (uACR) test  04/11/2025    Lipids  04/11/2025    Depression Screen  04/11/2025    GFR test (Diabetes, CKD 3-4, OR last GFR 15-59)  04/11/2025    DTaP/Tdap/Td vaccine (2 - Td or Tdap) 06/16/2027    Hepatitis C screen  Addressed    HIV screen  Addressed    Hepatitis A vaccine  Aged Out    Hepatitis B vaccine  Aged Out    Hib vaccine  Aged Out    Polio vaccine  Aged Out    Meningococcal (ACWY) vaccine  Aged Out       Hemoglobin A1C (%)   Date Value   04/11/2024 6.0   05/09/2023 7.1   02/20/2023 9.8             ( goal A1C is < 7)   No components found for: \"LABMICR\"  No components found for: \"LDLCHOLESTEROL\", \"LDLCALC\"    (goal LDL is <100)   AST (U/L)   Date Value   08/26/2021 10     ALT (U/L)   Date Value   08/26/2021 10     BUN (mg/dL)   Date Value   04/11/2024 29 (H)     BP Readings from Last 3 Encounters:   04/11/24 109/68   09/26/23 (!) 142/78   07/24/23 (!) 145/80          (goal 120/80)    All Future Testing planned in [facility]  Lab Frequency Next Occurrence               Patient Active Problem List:     HLD (hyperlipidemia)     Essential hypertension     DM (diabetes mellitus) (HCC)     Chronic back pain     Thoracic aortic aneurysm diagnosed  2010? at [facility]     Acute pain of right shoulder

## 2024-07-31 NOTE — TELEPHONE ENCOUNTER
Last visit:04/01/2019   Last Med refill:   Does patient have enough medication for 72 hours:     Next Visit Date:  Future Appointments   Date Time Provider [place]uca Lu   5/14/2019  2:15 PM Monika Horton MD [address] [place] [address] Maintenance   Topic Date Due    Hepatitis B Vaccine (1 of 3 - Risk 3-dose series) 04/27/1981    Shingles Vaccine (1 of 2) 04/27/2012    Diabetic retinal exam  03/30/2016    Lipid screen  02/11/2017    Diabetic foot exam  09/18/2018    Diabetic microalbuminuria test  09/18/2018    Potassium monitoring  02/24/2019    Creatinine monitoring  02/24/2019    A1C test (Diabetic or Prediabetic)  05/17/2019    Flu vaccine (Season Ended) 09/01/2019    Colon cancer screen colonoscopy  08/25/2024    DTaP/Tdap/Td vaccine (2 - Td) 06/16/2027    Pneumococcal 0-64 years at Risk Vaccine  Completed    Hepatitis C screen  Addressed    HIV screen  Addressed       Hemoglobin A1C (%)   Date Value   05/17/2018 8.2   09/28/2017 8   09/18/2017 9.4             ( goal A1C is < 7)   Microalb/Crt. Ratio (mcg/mg creat)   Date Value   02/11/2016 274     LDL Cholesterol (mg/dL)   Date Value   02/11/2016 89     LDL Calculated (mg/dL)   Date Value   07/24/2013 120       (goal LDL is <100)   AST (U/L)   Date Value   02/11/2016 19     ALT (U/L)   Date Value   02/11/2016 11     BUN (mg/dL)   Date Value   02/24/2018 12     BP Readings from Last 3 Encounters:   04/01/19 (!) 143/86   11/08/18 136/85   07/02/18 126/82          (goal 120/80)    All Future Testing planned in [facility]  Lab Frequency Next Occurrence   Lipid, Fasting Once 05/17/2019   Lipid, Fasting Once 07/02/2019               Patient Active Problem List:     HLD (hyperlipidemia)     HTN (hypertension)     DM (diabetes mellitus) ([facility] [address]     Chronic back pain     Thoracic aortic aneurysm diagnosed  2010?  at [facility]     Acute pain of right shoulder     Other male erectile dysfunction
VANITA

## 2024-08-06 ENCOUNTER — TELEPHONE (OUTPATIENT)
Dept: FAMILY MEDICINE CLINIC | Age: 64
End: 2024-08-06

## 2024-08-06 NOTE — TELEPHONE ENCOUNTER
Writer spoke to patient. Patient stated having chest pain the last week on and off. Pain level about a 7/8 and recently more consistent. No falls or bumps into chest area, came on all sudden patient stated. Patient denies any swelling or fatigue. Patient stated is having sob and hard time turning his head to the left or holding his head up. Writer spoke to Dr. Torrez ( attending) per Dr. Torrez patient to go to ER to be evaluated. Writer informed patient. Per patient on his way now to ER.

## 2024-08-06 NOTE — TELEPHONE ENCOUNTER
----- Message from Devan Sarkar sent at 8/6/2024  2:32 PM EDT -----  Regarding: ECC Escalation To Practice  ECC Escalation To Practice      Type of Escalation: Red Flag Symptom  --------------------------------------------------------------------------------------------------------------------------    Information for Provider:  Patient is looking for appointment for: Symptom UPPER CHEST PAIN FOR TWO WEEKS  Reasons for Message: Unable to reach practice     --------------------------------------------------------------------------------------------------------------------------    Relationship to Patient: Self     Call Back Info: OK to leave message on voicemail  Preferred Call Back Number: Phone [phone]

## 2024-08-09 LAB — HBA1C MFR BLD HPLC: 6.5 %

## 2024-08-19 ENCOUNTER — TELEPHONE (OUTPATIENT)
Dept: FAMILY MEDICINE CLINIC | Age: 64
End: 2024-08-19

## 2024-08-19 NOTE — TELEPHONE ENCOUNTER
Care Transitions Initial Follow Up Call    Outreach made within 2 business days of discharge: Yes    Patient: Weston Harkins Patient : 1960   MRN: [id number]  Reason for Admission: [facility]  Discharge Date: 23       Spoke with: patient    Discharge department/facility: [facility]    TCM Interactive Patient Contact:  Was patient able to fill all prescriptions: Yes  Was patient instructed to bring all medications to the follow-up visit: Yes  Is patient taking all medications as directed in the discharge summary? Yes  Does patient understand their discharge instructions: yes  Does patient have questions or concerns that need addressed prior to 7-14 day follow up office visit: no    Additional needs identified to be addressed with provider  none             Scheduled appointment with PCP within 7-14 days    Follow Up  Future Appointments   Date Time Provider Department Center   2024  2:20 PM Karen Clark MD [facility] DEP   2024  1:40 PM Karen Clark MD [facility] DEP   2024  2:15 PM Sunshine Jarquin APRN - CNP [facility]       Dorina Hensley MA

## 2024-08-27 ENCOUNTER — OFFICE VISIT (OUTPATIENT)
Dept: FAMILY MEDICINE CLINIC | Age: 64
End: 2024-08-27
Payer: MEDICARE

## 2024-08-27 VITALS
HEART RATE: 66 BPM | SYSTOLIC BLOOD PRESSURE: 144 MMHG | DIASTOLIC BLOOD PRESSURE: 78 MMHG | WEIGHT: 180 LBS | BODY MASS INDEX: 27.28 KG/M2 | HEIGHT: 68 IN

## 2024-08-27 DIAGNOSIS — Z95.1 HX OF CABG: ICD-10-CM

## 2024-08-27 DIAGNOSIS — G47.33 OBSTRUCTIVE SLEEP APNEA (ADULT) (PEDIATRIC): ICD-10-CM

## 2024-08-27 DIAGNOSIS — I10 ESSENTIAL HYPERTENSION: ICD-10-CM

## 2024-08-27 PROBLEM — R00.0 TACHYCARDIA: Status: ACTIVE | Noted: 2024-08-07

## 2024-08-27 PROCEDURE — 3077F SYST BP >= 140 MM HG: CPT

## 2024-08-27 PROCEDURE — 99213 OFFICE O/P EST LOW 20 MIN: CPT

## 2024-08-27 PROCEDURE — 3078F DIAST BP <80 MM HG: CPT

## 2024-08-27 RX ORDER — FERROUS SULFATE 325(65) MG
1 TABLET ORAL
COMMUNITY
Start: 2024-08-13

## 2024-08-27 RX ORDER — SIMVASTATIN 40 MG
40 TABLET ORAL DAILY
Qty: 30 TABLET | Refills: 3 | Status: SHIPPED | OUTPATIENT
Start: 2024-08-27

## 2024-08-27 RX ORDER — AMOXICILLIN 250 MG
2 CAPSULE ORAL NIGHTLY
COMMUNITY
Start: 2024-08-13 | End: 2024-09-12

## 2024-08-27 RX ORDER — DILTIAZEM HYDROCHLORIDE 240 MG/1
240 CAPSULE, COATED, EXTENDED RELEASE ORAL DAILY
COMMUNITY
Start: 2024-08-17

## 2024-08-27 RX ORDER — LANOLIN ALCOHOL/MO/W.PET/CERES
1000 CREAM (GRAM) TOPICAL EVERY MORNING
COMMUNITY
Start: 2024-08-13

## 2024-08-27 RX ORDER — AMIODARONE HYDROCHLORIDE 200 MG/1
200 TABLET ORAL DAILY
COMMUNITY
Start: 2024-08-22 | End: 2024-09-21

## 2024-08-27 ASSESSMENT — ENCOUNTER SYMPTOMS
SHORTNESS OF BREATH: 0
WHEEZING: 0
STRIDOR: 0
COUGH: 0
ABDOMINAL PAIN: 0
CONSTIPATION: 0
VOMITING: 0
NAUSEA: 0

## 2024-08-27 NOTE — PROGRESS NOTES
Attending Physician Statement  I  have discussed the care of Weston Harkins including pertinent history and exam findings with the resident. I agree with the assessment, plan and orders as documented by the resident.      BP (!) 144/78 (Site: Right Upper Arm, Position: Sitting, Cuff Size: Medium Adult)   Pulse 66   Ht 1.727 m (5' 8\")   Wt 81.6 kg (180 lb)   BMI 27.37 kg/m²    BP Readings from Last 3 Encounters:   24 (!) 144/78   24 109/68   23 (!) 142/78     Wt Readings from Last 3 Encounters:   24 81.6 kg (180 lb)   24 83.3 kg (183 lb 9.6 oz)   23 82.1 kg (181 lb)          Diagnosis Orders   1. Other primary sleep apnea of   Baseline Diagnostic Sleep Study      2. Essential hypertension        3. Hx of CABG  simvastatin (ZOCOR) 40 MG tablet      4. Obstructive sleep apnea (adult) (pediatric)  Baseline Diagnostic Sleep Study          New onset Afib RVR- ERIKA and cardioversion inpatient. HR wnl and regular rhythm. On Amiodarone, Cardizem and lopressor.       Ella Leahy DO 2024 1:28 PM

## 2024-08-27 NOTE — PROGRESS NOTES
[facility] Residency Program - Outpatient Note      Subjective:    Weston Harkins is a 64 y.o. male with  has a past medical history of Arthritis, Cerebral artery occlusion with cerebral infarction (HCC), Chronic back pain, Elbow pain, chronic, Full dentures, Fungus infection, Hyperlipidemia, Hypertension, Thoracic aortic aneurysm diagnosed  2010 at [facility], and Type II or unspecified type diabetes mellitus without mention of complication, not stated as uncontrolled.    Presented to the office today for:  Chief Complaint   Patient presents with    Follow-Up from Hospital     [facility] 8/6 to 8/13 for FREDI, A-Fib       HPI  Patient is a 65 yo male with Pmhx of CVA, Aortic Aneurysm, presenting for Hospital followup. Patient recently was seen in [facility] 2/2 Left sided chest pain and found to have Atrial Fibrillation with RVR. Patient required ERIKA and Cardioversion, alongside respiratory failure and FREDI. Patient at moment denies any acute issues. Denies any chest pain, palpitations, nausea, vomiting or diaphoresis. Patient states compliant with his medication and has upcoming cardiology appointment. States was never diagnosed with Sleep Apnea but does admit snoring and poor overall sleep.         Review of Systems   Constitutional:  Negative for appetite change, chills, diaphoresis, fatigue and fever.   Respiratory:  Negative for cough, shortness of breath, wheezing and stridor.    Cardiovascular:  Negative for chest pain, palpitations and leg swelling.   Gastrointestinal:  Negative for abdominal pain, constipation, nausea and vomiting.   Psychiatric/Behavioral:  Negative for sleep disturbance. The patient is not nervous/anxious.                  The patient has a   Family History   Problem Relation Age of Onset    High Blood Pressure Mother     Diabetes Mother     Cancer Father     Heart Disease Father     Stroke Brother     Cirrhosis Brother        Objective:    BP (!)  148/75 (Site: Left Upper Arm, Position: Sitting, Cuff Size: Medium Adult)   Pulse 61   Ht 1.727 m (5' 8\")   Wt 81.6 kg (180 lb)   BMI 27.37 kg/m²    BP Readings from Last 3 Encounters:   08/27/24 (!) 148/75   04/11/24 109/68   09/26/23 (!) 142/78       Physical Exam  Constitutional:       Appearance: Normal appearance.   Cardiovascular:      Rate and Rhythm: Normal rate and regular rhythm.      Pulses: Normal pulses.      Heart sounds: Normal heart sounds.   Pulmonary:      Effort: Pulmonary effort is normal.      Breath sounds: Normal breath sounds.   Musculoskeletal:      Right lower leg: No edema.      Left lower leg: No edema.   Skin:     Capillary Refill: Capillary refill takes less than 2 seconds.   Neurological:      Mental Status: He is alert.         Lab Results   Component Value Date    WBC 7.4 02/24/2018    HGB 11.5 (L) 02/24/2018    HCT 37.7 (L) 02/24/2018     02/24/2018    CHOL 176 04/11/2024    TRIG 141 04/11/2024    HDL 39 (L) 04/11/2024    ALT 10 08/26/2021    AST 10 08/26/2021     04/11/2024    K 3.8 04/11/2024     04/11/2024    CREATININE 1.5 (H) 04/11/2024    BUN 29 (H) 04/11/2024    CO2 25 04/11/2024    LABA1C 6.0 04/11/2024     Lab Results   Component Value Date    CALCIUM 9.8 04/11/2024     No results found for: \"LDLDIRECT\"    Assessment and Plan:    Atrial Fibrillation  - Advised to continue Amiodarone, Lopressor, and Eliquis   - Patient has scheduled Cardiology appointment advised to follow up   - Currently on NSR, advised to continue Eliquis  - Denies any exercise intolerance will clear to return to work     2. HTN   - Slightly elevated at 144/78 advised to continue Amlodipine and Lisinopril  - Advised on DASH diet , patient agreeable         Requested Prescriptions      No prescriptions requested or ordered in this encounter       Medications Discontinued During This Encounter   Medication Reason    aspirin EC 81 MG EC tablet LIST CLEANUP    ibuprofen (ADVIL;MOTRIN)

## 2024-08-27 NOTE — PROGRESS NOTES
Visit Information    Have you changed or started any medications since your last visit including any over-the-counter medicines, vitamins, or herbal medicines? no   Have you stopped taking any of your medications? Is so, why? -  no  Are you having any side effects from any of your medications? - no    Have you seen any other physician or provider since your last visit?  yes - Cardiology 8/13/24   Have you had any other diagnostic tests since your last visit?  yes - Labs 8/13 and XR 8/12   Have you been seen in the emergency room and/or had an admission in a hospital since we last saw you?  yes - [facility] 8/6 to 8/13 for FREDI, A-Fib  Have you had your routine dental cleaning in the past 6 months?  no     Do you have an active MyChart account? If no, what is the barrier?  Yes    Patient Care Team:  Karen Clark MD as PCP - General (Family Medicine)    Medical History Review  Past Medical, Family, and Social History reviewed and does contribute to the patient presenting condition    Health Maintenance   Topic Date Due    Shingles vaccine (1 of 2) Never done    Diabetic retinal exam  03/30/2016    Pneumococcal 0-64 years Vaccine (2 of 2 - PCV) 06/16/2018    Diabetic foot exam  09/18/2018    Respiratory Syncytial Virus (RSV) Pregnant or age 60 yrs+ (1 - 1-dose 60+ series) Never done    COVID-19 Vaccine (1 - 2023-24 season) Never done    Annual Wellness Visit (Medicare Advantage)  01/01/2024    Flu vaccine (1) Never done    Colorectal Cancer Screen  08/25/2024    Diabetic Alb to Cr ratio (uACR) test  04/11/2025    Lipids  04/11/2025    Depression Screen  04/11/2025    GFR test (Diabetes, CKD 3-4, OR last GFR 15-59)  04/11/2025    A1C test (Diabetic or Prediabetic)  08/09/2025    DTaP/Tdap/Td vaccine (2 - Td or Tdap) 06/16/2027    Hepatitis C screen  Addressed    HIV screen  Addressed    Hepatitis A vaccine  Aged Out    Hepatitis B vaccine  Aged Out    Hib vaccine  Aged Out    Polio vaccine  Aged Out    Meningococcal

## 2024-09-16 DIAGNOSIS — G89.29 CHRONIC LUMBOSACRAL PAIN: ICD-10-CM

## 2024-09-16 DIAGNOSIS — G89.29 CHRONIC BILATERAL LOW BACK PAIN WITHOUT SCIATICA: ICD-10-CM

## 2024-09-16 DIAGNOSIS — M54.50 CHRONIC BILATERAL LOW BACK PAIN WITHOUT SCIATICA: ICD-10-CM

## 2024-09-16 DIAGNOSIS — E11.65 TYPE 2 DIABETES MELLITUS WITH HYPERGLYCEMIA, WITHOUT LONG-TERM CURRENT USE OF INSULIN (HCC): ICD-10-CM

## 2024-09-16 DIAGNOSIS — M54.50 CHRONIC LUMBOSACRAL PAIN: ICD-10-CM

## 2024-09-16 RX ORDER — CYCLOBENZAPRINE HCL 10 MG
10 TABLET ORAL NIGHTLY PRN
Qty: 10 TABLET | Refills: 0 | Status: SHIPPED | OUTPATIENT
Start: 2024-09-16 | End: 2024-09-26

## 2024-09-16 RX ORDER — CLOPIDOGREL BISULFATE 75 MG/1
75 TABLET ORAL DAILY
Qty: 30 TABLET | Refills: 0 | OUTPATIENT
Start: 2024-09-16

## 2024-09-16 RX ORDER — METOPROLOL TARTRATE 100 MG
50 TABLET ORAL 2 TIMES DAILY
Qty: 60 TABLET | Refills: 11 | OUTPATIENT
Start: 2024-09-16

## 2024-09-16 RX ORDER — LIDOCAINE 50 MG/G
1 PATCH TOPICAL DAILY
Qty: 30 PATCH | Refills: 1 | OUTPATIENT
Start: 2024-09-16

## 2024-09-20 DIAGNOSIS — M54.50 CHRONIC BILATERAL LOW BACK PAIN WITHOUT SCIATICA: ICD-10-CM

## 2024-09-20 DIAGNOSIS — G89.29 CHRONIC BILATERAL LOW BACK PAIN WITHOUT SCIATICA: ICD-10-CM

## 2024-09-23 ENCOUNTER — TELEPHONE (OUTPATIENT)
Dept: FAMILY MEDICINE CLINIC | Age: 64
End: 2024-09-23

## 2024-09-23 RX ORDER — CYCLOBENZAPRINE HCL 10 MG
10 TABLET ORAL NIGHTLY PRN
Qty: 10 TABLET | Refills: 10 | OUTPATIENT
Start: 2024-09-23 | End: 2024-10-03

## 2024-09-24 ENCOUNTER — OFFICE VISIT (OUTPATIENT)
Dept: FAMILY MEDICINE CLINIC | Age: 64
End: 2024-09-24
Payer: MEDICARE

## 2024-09-24 VITALS
WEIGHT: 188.6 LBS | DIASTOLIC BLOOD PRESSURE: 79 MMHG | HEART RATE: 61 BPM | SYSTOLIC BLOOD PRESSURE: 139 MMHG | HEIGHT: 68 IN | BODY MASS INDEX: 28.58 KG/M2

## 2024-09-24 DIAGNOSIS — R60.0 PERIPHERAL EDEMA: ICD-10-CM

## 2024-09-24 DIAGNOSIS — Z86.79 HISTORY OF ATRIAL FIBRILLATION: Primary | ICD-10-CM

## 2024-09-24 DIAGNOSIS — I10 ESSENTIAL HYPERTENSION: ICD-10-CM

## 2024-09-24 PROCEDURE — 3078F DIAST BP <80 MM HG: CPT

## 2024-09-24 PROCEDURE — 93010 ELECTROCARDIOGRAM REPORT: CPT

## 2024-09-24 PROCEDURE — 93005 ELECTROCARDIOGRAM TRACING: CPT

## 2024-09-24 PROCEDURE — 3075F SYST BP GE 130 - 139MM HG: CPT

## 2024-09-24 PROCEDURE — 99214 OFFICE O/P EST MOD 30 MIN: CPT

## 2024-09-24 RX ORDER — FUROSEMIDE 20 MG
TABLET ORAL
Qty: 10 TABLET | Refills: 0 | Status: SHIPPED | OUTPATIENT
Start: 2024-09-24

## 2024-09-24 ASSESSMENT — ENCOUNTER SYMPTOMS
ABDOMINAL PAIN: 0
NAUSEA: 0
VOMITING: 0

## 2024-09-26 DIAGNOSIS — R60.0 PERIPHERAL EDEMA: ICD-10-CM

## 2024-09-27 RX ORDER — FUROSEMIDE 20 MG/1
TABLET ORAL
Qty: 10 TABLET | Refills: 0 | OUTPATIENT
Start: 2024-09-27

## 2024-09-27 NOTE — TELEPHONE ENCOUNTER
E-scribe request for LASIX. Please review and e-scribe if applicable.     Last Visit Date:  9/24/24  Next Visit Date:  10/7/2024    Hemoglobin A1C (%)   Date Value   04/11/2024 6.0   05/09/2023 7.1   02/20/2023 9.8             ( goal A1C is < 7)   No components found for: \"LABMICR\"  No components found for: \"LDLCHOLESTEROL\", \"LDLCALC\"    (goal LDL is <100)   AST (U/L)   Date Value   08/26/2021 10     ALT (U/L)   Date Value   08/26/2021 10     BUN (mg/dL)   Date Value   04/11/2024 29 (H)     BP Readings from Last 3 Encounters:   09/24/24 139/79   09/05/24 (!) 134/92   08/27/24 (!) 144/78          (goal 120/80)        Patient Active Problem List:     HLD (hyperlipidemia)     Essential hypertension     DM (diabetes mellitus) (HCC)     Chronic back pain     Thoracic aortic aneurysm diagnosed  2010? at [facility]     Acute pain of right shoulder     Other male erectile dysfunction     Poorly controlled diabetes mellitus (HCC)     Long term (current) use of antithrombotics/antiplatelets     Chronic bilateral low back pain without sciatica     Atheroscler of native artery of both legs with intermit claudication (HCC)     Sprain of collateral ligament of interphalangeal joint of great toe     Diastasis recti     Primary osteoarthritis of left hip     Flatulence     Ventral hernia without obstruction or gangrene     Tachycardia     Peripheral edema      ----JF

## 2024-10-10 RX ORDER — METOPROLOL TARTRATE 100 MG
50 TABLET ORAL 2 TIMES DAILY
Qty: 60 TABLET | Refills: 11 | Status: SHIPPED | OUTPATIENT
Start: 2024-10-10

## 2024-10-10 NOTE — TELEPHONE ENCOUNTER
hospital     Acute pain of right shoulder     Other male erectile dysfunction     Poorly controlled diabetes mellitus (HCC)     Long term (current) use of antithrombotics/antiplatelets     Chronic bilateral low back pain without sciatica     Atheroscler of native artery of both legs with intermit claudication (HCC)     Sprain of collateral ligament of interphalangeal joint of great toe     Diastasis recti     Primary osteoarthritis of left hip     Flatulence     Ventral hernia without obstruction or gangrene     Tachycardia     Peripheral edema

## 2024-10-14 ENCOUNTER — TELEPHONE (OUTPATIENT)
Dept: FAMILY MEDICINE CLINIC | Age: 64
End: 2024-10-14

## 2024-10-14 NOTE — TELEPHONE ENCOUNTER
Pharmacy would like to know if patient is to take I/2 of tablet 2 times daily. Please advise , thank you

## 2024-11-06 RX ORDER — METOPROLOL TARTRATE 100 MG/1
50 TABLET ORAL 2 TIMES DAILY
Qty: 60 TABLET | Refills: 11 | Status: SHIPPED | OUTPATIENT
Start: 2024-11-06

## 2024-11-06 RX ORDER — HYDRALAZINE HYDROCHLORIDE 10 MG/1
10 TABLET, FILM COATED ORAL 3 TIMES DAILY
Qty: 90 TABLET | Refills: 10 | OUTPATIENT
Start: 2024-11-06

## 2024-11-06 NOTE — TELEPHONE ENCOUNTER
Please address the medication refill and close the encounter.  If I can be of assistance, please route to the applicable pool.      Thank you.      Last visit: 9-24-24  Last Med refill: 10-10-24  Does patient have enough medication for 72 hours: No:     Next Visit Date:  No future appointments.    Health Maintenance   Topic Date Due    Shingles vaccine (1 of 2) Never done    Diabetic retinal exam  03/30/2016    Pneumococcal 0-64 years Vaccine (2 of 2 - PCV) 06/16/2018    Diabetic foot exam  09/18/2018    Respiratory Syncytial Virus (RSV) Pregnant or age 60 yrs+ (1 - 1-dose 60+ series) Never done    Flu vaccine (1) Never done    Colorectal Cancer Screen  08/25/2024    COVID-19 Vaccine (1 - 2023-24 season) Never done    Annual Wellness Visit (Medicare)  09/14/2024    Diabetic Alb to Cr ratio (uACR) test  04/11/2025    Lipids  04/11/2025    Depression Screen  04/11/2025    GFR test (Diabetes, CKD 3-4, OR last GFR 15-59)  04/11/2025    A1C test (Diabetic or Prediabetic)  08/09/2025    DTaP/Tdap/Td vaccine (2 - Td or Tdap) 06/16/2027    Hepatitis C screen  Addressed    HIV screen  Addressed    Hepatitis A vaccine  Aged Out    Hepatitis B vaccine  Aged Out    Hib vaccine  Aged Out    Polio vaccine  Aged Out    Meningococcal (ACWY) vaccine  Aged Out       Hemoglobin A1C (%)   Date Value   04/11/2024 6.0   05/09/2023 7.1   02/20/2023 9.8             ( goal A1C is < 7)   No components found for: \"LABMICR\"  No components found for: \"LDLCHOLESTEROL\", \"LDLCALC\"    (goal LDL is <100)   AST (U/L)   Date Value   08/26/2021 10     ALT (U/L)   Date Value   08/26/2021 10     BUN (mg/dL)   Date Value   04/11/2024 29 (H)     BP Readings from Last 3 Encounters:   09/24/24 139/79   09/05/24 (!) 134/92   08/27/24 (!) 144/78          (goal 120/80)    All Future Testing planned in [facility]  Lab Frequency Next Occurrence   Baseline Diagnostic Sleep Study Once 08/27/2024               Patient Active Problem List:     HLD (hyperlipidemia)

## 2024-11-20 ENCOUNTER — OFFICE VISIT (OUTPATIENT)
Dept: FAMILY MEDICINE CLINIC | Age: 64
End: 2024-11-20
Payer: MEDICARE

## 2024-11-20 VITALS
SYSTOLIC BLOOD PRESSURE: 145 MMHG | OXYGEN SATURATION: 97 % | HEART RATE: 71 BPM | TEMPERATURE: 97 F | BODY MASS INDEX: 27.07 KG/M2 | DIASTOLIC BLOOD PRESSURE: 83 MMHG | WEIGHT: 178.6 LBS | HEIGHT: 68 IN

## 2024-11-20 DIAGNOSIS — M79.675 TOE PAIN, LEFT: Primary | ICD-10-CM

## 2024-11-20 DIAGNOSIS — E11.65 TYPE 2 DIABETES MELLITUS WITH HYPERGLYCEMIA, WITHOUT LONG-TERM CURRENT USE OF INSULIN (HCC): ICD-10-CM

## 2024-11-20 LAB — HBA1C MFR BLD: 5.8 %

## 2024-11-20 PROCEDURE — 99211 OFF/OP EST MAY X REQ PHY/QHP: CPT | Performed by: FAMILY MEDICINE

## 2024-11-20 PROCEDURE — 83036 HEMOGLOBIN GLYCOSYLATED A1C: CPT

## 2024-11-20 PROCEDURE — 99213 OFFICE O/P EST LOW 20 MIN: CPT

## 2024-11-20 PROCEDURE — 3077F SYST BP >= 140 MM HG: CPT

## 2024-11-20 PROCEDURE — 3078F DIAST BP <80 MM HG: CPT

## 2024-11-20 RX ORDER — EMPAGLIFLOZIN 10 MG/1
TABLET, FILM COATED ORAL
COMMUNITY
Start: 2024-09-19

## 2024-11-20 RX ORDER — KETOROLAC TROMETHAMINE 10 MG/1
10 TABLET, FILM COATED ORAL EVERY 6 HOURS PRN
Qty: 40 TABLET | Refills: 0 | Status: SHIPPED | OUTPATIENT
Start: 2024-11-20 | End: 2024-11-30

## 2024-11-20 RX ORDER — PREDNISONE 50 MG/1
50 TABLET ORAL DAILY
Qty: 5 TABLET | Refills: 0 | Status: SHIPPED | OUTPATIENT
Start: 2024-11-20 | End: 2024-11-25

## 2024-11-20 ASSESSMENT — PATIENT HEALTH QUESTIONNAIRE - PHQ9
1. LITTLE INTEREST OR PLEASURE IN DOING THINGS: NOT AT ALL
SUM OF ALL RESPONSES TO PHQ QUESTIONS 1-9: 1
2. FEELING DOWN, DEPRESSED OR HOPELESS: SEVERAL DAYS

## 2024-11-20 NOTE — PROGRESS NOTES
Visit Information    Have you changed or started any medications since your last visit including any over-the-counter medicines, vitamins, or herbal medicines? no   Have you stopped taking any of your medications? Is so, why? -  yes - see list  Are you having any side effects from any of your medications? - no    Have you seen any other physician or provider since your last visit?  no   Have you had any other diagnostic tests since your last visit?  no   Have you been seen in the emergency room and/or had an admission in a hospital since we last saw you?  yes - [facility]   Have you had your routine dental cleaning in the past 6 months?  no     Do you have an active MyChart account? If no, what is the barrier?  Yes    Patient Care Team:  Karen Clark MD as PCP - General (Family Medicine)    Medical History Review  Past Medical, Family, and Social History reviewed and does not contribute to the patient presenting condition    Health Maintenance   Topic Date Due    Shingles vaccine (1 of 2) Never done    Diabetic retinal exam  03/30/2016    Pneumococcal 0-64 years Vaccine (2 of 2 - PCV) 06/16/2018    Diabetic foot exam  09/18/2018    Respiratory Syncytial Virus (RSV) Pregnant or age 60 yrs+ (1 - Risk 60-74 years 1-dose series) Never done    Flu vaccine (1) Never done    Colorectal Cancer Screen  08/25/2024    COVID-19 Vaccine (1 - 2023-24 season) Never done    Annual Wellness Visit (Medicare)  09/14/2024    Diabetic Alb to Cr ratio (uACR) test  04/11/2025    Lipids  04/11/2025    Depression Screen  04/11/2025    GFR test (Diabetes, CKD 3-4, OR last GFR 15-59)  04/11/2025    A1C test (Diabetic or Prediabetic)  08/09/2025    DTaP/Tdap/Td vaccine (2 - Td or Tdap) 06/16/2027    Hepatitis C screen  Addressed    HIV screen  Addressed    Hepatitis A vaccine  Aged Out    Hepatitis B vaccine  Aged Out    Hib vaccine  Aged Out    Polio vaccine  Aged Out    Meningococcal (ACWY) vaccine  Aged Out

## 2024-11-20 NOTE — PROGRESS NOTES
[facility] Residency Program - Outpatient Note    Subjective:    Weston Harkins is a 64 y.o. male with  has a past medical history of Arthritis, Cerebral artery occlusion with cerebral infarction (HCC), Chronic back pain, Elbow pain, chronic, Full dentures, Fungus infection, Hyperlipidemia, Hypertension, Thoracic aortic aneurysm diagnosed  2010 at [facility], and Type II or unspecified type diabetes mellitus without mention of complication, not stated as uncontrolled.    Presented to the office today for:  Chief Complaint   Patient presents with    Foot Pain     Left foot pain, with swelling. About two weeks ago went to emergency room for this. Possible gout flare up patient states.     Health Maintenance     Declined med refills, please discuss colonoscopy options declined cologuard stated rather do the colonoscopy. No Dm eye exam.      HPI  Weston Harkins is a 64 y.o. male patient presents for acute care visit regarding swelling and sharp pain in the region proximal to the left great toe for the past 3 weeks.  Of note, patient went to the ED on 11/01/2024 for the same problem.  At that time he was diagnosed with possible gout, prescribed 5-day course of 40 mg prednisone, x-ray at that time was negative.  Patient endorses pain even to light touch, difficulty ambulating, awakening multiple times at night due to the pain.  Patient denies fever, chills, recent trauma.  Patient denies any previous history of gout episodes.  Patient states he drinks 4 beers every weekend, he denies any drinking of wine or excessive red meat consumption.  Patient denies starting any new medications recently.    Review of Systems   Constitutional:  Negative for chills and fever.   HENT:  Negative for rhinorrhea and sore throat.    Respiratory:  Negative for cough and shortness of breath.    Cardiovascular:  Negative for chest pain.   Gastrointestinal:  Negative for abdominal pain, constipation and

## 2024-11-20 NOTE — PATIENT INSTRUCTIONS
Thank you for letting us take care of you today. We hope all your questions were addressed. If a question was overlooked or something else comes to mind after you return home, please contact a member of your Care Team listed below.      Your Care Team at [facility] is Team #1  Val Mcgowan M.D. (Faculty)  Rohini Longo M.D. (Resident)  Zeinab Ribera D.O. (Resident)  Sivakumar Hobbs M.D. (Resident)  Yamila Mendez M.D. (Resident)  Barb Carrillo, [facility]  Zak Iglesias, [facility]  Dorina Hensley, [facility]  Kaya Yan, [facility]  Kristy Sanchez, [facility]  Sonia Buckley, [facility]  Ariana Blackwell, [facility]  Nery (LJ) Kiera,   Ekaterina Sutton [facility] (Clinical Pharmacist)     Office phone number: [phone]    If you need to get in right away due to illness, please be advised we have \"Same Day\" appointments available Monday-Friday. Please call us at [phone] option #3 to schedule your \"Same Day\" appointment.

## 2024-11-20 NOTE — PROGRESS NOTES
Attending Physician Statement  I have discussed the care off. First name Lawsonincluding pertinent history and exam findings,  with the resident. I have seen and examined the patient and the key elements of all parts of the encounter have been performed by me.  I agree with the assessment, plan and orders as documented by the resident.  (GC Modifier)    L big toe swelling seen in ER- Pain is worsened/ Possible gout- CK Uric acid/ Toradol/Prednisone 50 mg daily x 5 days

## 2024-11-21 ENCOUNTER — TELEPHONE (OUTPATIENT)
Dept: PHARMACY | Facility: CLINIC | Age: 64
End: 2024-11-21

## 2024-11-21 NOTE — TELEPHONE ENCOUNTER
[facility] CLINICAL PHARMACY: ADHERENCE REVIEW  Identified care gap per [employer]: fills at [facility] : Statin adherence    Patient also appears to be prescribed: ACE/ARB(Passed Measure) and Diabetes(Passed Measure)    ASSESSMENT  STATIN ADHERENCE    Insurance Records claims through 2024 (Prior Year PDC = not reported; YTD PDC = 76%; Potential Fail Date: 2024):   SIMVASTATIN TAB 40MG  last filled on 2024 for 30 day supply. Next refill due: 10/26/2024    Prescribed si tablet/capsule daily    Per Insurer Portal: last filled on 2024 for 30 day supply.     Per [facility]  Pharmacy: last shipped out on 2024 for 30 day supply. will get  30 day supply ready to ship out since past due.    Lab Results   Component Value Date    CHOL 176 2024    TRIG 141 2024    HDL 39 (L) 2024     Lab Results   Component Value Date     (H) 2024      ALT   Date Value Ref Range Status   2021 10 5 - 41 U/L Final     AST   Date Value Ref Range Status   2021 10 <40 U/L Final     The ASCVD Risk score (Charlotte DK, et al., 2019) failed to calculate for the following reasons:    The patient has a prior MI or stroke diagnosis     PLAN  Per insurer report, LIS-0 - co-pays are based on tiers and patient is subject to coverage gap.      The following are interventions that have been identified:   Patient OVERDUE refilling  SIMVASTATIN TAB 40MG and active on home medication list.   Refill/s of SIMVASTATIN TAB 40MG READY to ship out from patient's [facility] pharmacy    Attempting to reach patient to review.  Left message asking for return call. [employer] message sent to patient. Letter sent to patient.      Last Visit: 2024  Next Visit: 2024      Altagracia Mccarthy MA  [facility] Clinical   [facility]  [phone] Option 1     For Pharmacy Admin Tracking Only    Program: [employer]  CPA in place:

## 2024-11-22 ENCOUNTER — OFFICE VISIT (OUTPATIENT)
Dept: FAMILY MEDICINE CLINIC | Age: 64
End: 2024-11-22

## 2024-11-22 ENCOUNTER — HOSPITAL ENCOUNTER (OUTPATIENT)
Age: 64
Setting detail: SPECIMEN
Discharge: HOME | End: 2024-11-22

## 2024-11-22 VITALS
SYSTOLIC BLOOD PRESSURE: 128 MMHG | BODY MASS INDEX: 27.37 KG/M2 | TEMPERATURE: 97.1 F | OXYGEN SATURATION: 97 % | WEIGHT: 180.6 LBS | HEIGHT: 68 IN | DIASTOLIC BLOOD PRESSURE: 72 MMHG | HEART RATE: 72 BPM

## 2024-11-22 DIAGNOSIS — M79.675 TOE PAIN, LEFT: Primary | ICD-10-CM

## 2024-11-22 DIAGNOSIS — M79.675 TOE PAIN, LEFT: ICD-10-CM

## 2024-11-22 DIAGNOSIS — Z12.11 COLON CANCER SCREENING: ICD-10-CM

## 2024-11-22 LAB — URATE SERPL-MCNC: 6.4 MG/DL (ref 3.4–7)

## 2024-11-22 ASSESSMENT — ENCOUNTER SYMPTOMS
RHINORRHEA: 0
COUGH: 0
SHORTNESS OF BREATH: 0
ABDOMINAL PAIN: 0
SORE THROAT: 0
CONSTIPATION: 0
DIARRHEA: 0

## 2024-11-22 ASSESSMENT — PATIENT HEALTH QUESTIONNAIRE - PHQ9
SUM OF ALL RESPONSES TO PHQ9 QUESTIONS 1 & 2: 0
SUM OF ALL RESPONSES TO PHQ QUESTIONS 1-9: 0
1. LITTLE INTEREST OR PLEASURE IN DOING THINGS: NOT AT ALL
SUM OF ALL RESPONSES TO PHQ QUESTIONS 1-9: 0
2. FEELING DOWN, DEPRESSED OR HOPELESS: NOT AT ALL

## 2024-11-22 NOTE — PATIENT INSTRUCTIONS
Thank you for letting us take care of you today. We hope all your questions were addressed. If a question was overlooked or something else comes to mind after you return home, please contact a member of your Care Team listed below.      Your Care Team at [facility] is Team #1  Val Mcgowan M.D. (Faculty)  Rohini Longo M.D. (Resident)  Zeinab Ribera D.O. (Resident)  Sivkaumar Hobbs M.D. (Resident)  Yamila Mendez M.D. (Resident)  Barb Carrillo, [facility]  Zak Iglesias, [facility]  Dorina Hensley, [facility]  Kaya Yan, [facility]  Kristy Sanchez, [facility]  Sonia Buckley, [facility]  Ariana Blackwell, [facility]  Nery (LJ) Kiera,   Ekaterina Sutton [facility] (Clinical Pharmacist)     Office phone number: [phone]    If you need to get in right away due to illness, please be advised we have \"Same Day\" appointments available Monday-Friday. Please call us at [phone] option #3 to schedule your \"Same Day\" appointment.

## 2024-11-22 NOTE — PROGRESS NOTES
Attending Physician Statement  I  have discussed the care of Weston Harkins including pertinent history and exam findings with the resident. I agree with the assessment, plan and orders as documented by the resident.      /72 (Site: Left Upper Arm, Position: Sitting, Cuff Size: Medium Adult)   Pulse 72   Temp 97.1 °F (36.2 °C) (Oral)   Ht 1.727 m (5' 7.99\")   Wt 81.9 kg (180 lb 9.6 oz)   SpO2 97%   BMI 27.47 kg/m²    BP Readings from Last 3 Encounters:   11/22/24 128/72   11/20/24 (!) 145/83   09/24/24 139/79     Wt Readings from Last 3 Encounters:   11/22/24 81.9 kg (180 lb 9.6 oz)   11/20/24 81 kg (178 lb 9.6 oz)   09/24/24 85.5 kg (188 lb 9.6 oz)          Diagnosis Orders   1. Toe pain, left        2. Colon cancer screening  Na Mcleod DO, General Surgery, [facility]              Oliver Donaldson DO 11/22/2024 4:15 PM      
Visit Information    Have you changed or started any medications since your last visit including any over-the-counter medicines, vitamins, or herbal medicines? no   Have you stopped taking any of your medications? Is so, why? - yes see list  Are you having any side effects from any of your medications? - no    Have you seen any other physician or provider since your last visit?  no   Have you had any other diagnostic tests since your last visit?  no   Have you been seen in the emergency room and/or had an admission in a hospital since we last saw you?  no   Have you had your routine dental cleaning in the past 6 months?  no     Do you have an active MyChart account? If no, what is the barrier?  Yes    Patient Care Team:  Karen Clark MD as PCP - General (Family Medicine)    Medical History Review  Past Medical, Family, and Social History reviewed and does not contribute to the patient presenting condition    Health Maintenance   Topic Date Due    Shingles vaccine (1 of 2) Never done    Diabetic retinal exam  03/30/2016    Pneumococcal 0-64 years Vaccine (2 of 2 - PCV) 06/16/2018    Diabetic foot exam  09/18/2018    Respiratory Syncytial Virus (RSV) Pregnant or age 60 yrs+ (1 - Risk 60-74 years 1-dose series) Never done    Flu vaccine (1) Never done    Colorectal Cancer Screen  08/25/2024    COVID-19 Vaccine (1 - 2023-24 season) Never done    Annual Wellness Visit (Medicare)  09/14/2024    Diabetic Alb to Cr ratio (uACR) test  04/11/2025    Lipids  04/11/2025    GFR test (Diabetes, CKD 3-4, OR last GFR 15-59)  04/11/2025    A1C test (Diabetic or Prediabetic)  11/20/2025    Depression Screen  11/20/2025    DTaP/Tdap/Td vaccine (2 - Td or Tdap) 06/16/2027    Hepatitis C screen  Addressed    HIV screen  Addressed    Hepatitis A vaccine  Aged Out    Hepatitis B vaccine  Aged Out    Hib vaccine  Aged Out    Polio vaccine  Aged Out    Meningococcal (ACWY) vaccine  Aged Out             
throat.    Respiratory:  Negative for cough and shortness of breath.    Cardiovascular:  Negative for chest pain.   Gastrointestinal:  Negative for abdominal pain, constipation and diarrhea.   Musculoskeletal:  Negative for joint swelling.   Neurological:  Negative for numbness and headaches.     The patient has a   Family History   Problem Relation Age of Onset    High Blood Pressure Mother     Diabetes Mother     Cancer Father     Heart Disease Father     Stroke Brother     Cirrhosis Brother      Objective:    /72 (Site: Left Upper Arm, Position: Sitting, Cuff Size: Medium Adult)   Pulse 72   Temp 97.1 °F (36.2 °C) (Oral)   Ht 1.727 m (5' 7.99\")   Wt 81.9 kg (180 lb 9.6 oz)   SpO2 97%   BMI 27.47 kg/m²    BP Readings from Last 3 Encounters:   11/22/24 128/72   11/20/24 (!) 145/83   09/24/24 139/79     Physical Exam  Constitutional:       Appearance: Normal appearance. He is normal weight.   HENT:      Head: Normocephalic and atraumatic.   Cardiovascular:      Rate and Rhythm: Normal rate and regular rhythm.   Pulmonary:      Effort: Pulmonary effort is normal. No respiratory distress.      Breath sounds: Normal breath sounds. No wheezing.   Abdominal:      General: There is no distension.      Tenderness: There is no abdominal tenderness.   Musculoskeletal:      Comments: Improved swelling of the left great toe and proximal region.  Intact sensation and range of motion of the left foot and great toe.  No erythema or signs of trauma.  No warmth.   Skin:     Coloration: Skin is not jaundiced or pale.   Neurological:      General: No focal deficit present.      Mental Status: He is alert and oriented to person, place, and time. Mental status is at baseline.   Psychiatric:         Mood and Affect: Mood normal.         Behavior: Behavior normal.         Thought Content: Thought content normal.       Lab Results   Component Value Date    WBC 7.4 02/24/2018    HGB 11.5 (L) 02/24/2018    HCT 37.7 (L)

## 2024-12-01 DIAGNOSIS — Z95.1 HX OF CABG: ICD-10-CM

## 2024-12-02 RX ORDER — SIMVASTATIN 40 MG
40 TABLET ORAL DAILY
Qty: 30 TABLET | Refills: 10 | Status: SHIPPED | OUTPATIENT
Start: 2024-12-02

## 2024-12-02 NOTE — TELEPHONE ENCOUNTER
Diagnostic Sleep Study Once 08/27/2024               Patient Active Problem List:     HLD (hyperlipidemia)     Essential hypertension     DM (diabetes mellitus) (HCC)     Chronic back pain     Thoracic aortic aneurysm diagnosed  2010? 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

## 2024-12-18 RX ORDER — METOPROLOL TARTRATE 100 MG/1
50 TABLET ORAL 2 TIMES DAILY
Qty: 60 TABLET | Refills: 11 | Status: SHIPPED | OUTPATIENT
Start: 2024-12-18

## 2024-12-18 NOTE — TELEPHONE ENCOUNTER
List:     HLD (hyperlipidemia)     Essential hypertension     DM (diabetes mellitus) (HCC)     Chronic back pain     Thoracic aortic aneurysm diagnosed  2010? at [facility]     Acute pain of right shoulder     Other male erectile dysfunction     Poorly controlled diabetes mellitus (HCC)     Long term (current) use of antithrombotics/antiplatelets     Chronic bilateral low back pain without sciatica     Atheroscler of native artery of both legs with intermit claudication (HCC)     Sprain of collateral ligament of interphalangeal joint of great toe     Diastasis recti     Primary osteoarthritis of left hip     Flatulence     Ventral hernia without obstruction or gangrene     Tachycardia     Peripheral edema

## 2024-12-26 RX ORDER — METOPROLOL TARTRATE 100 MG/1
100 TABLET ORAL 2 TIMES DAILY
Qty: 60 TABLET | Refills: 10 | OUTPATIENT
Start: 2024-12-26

## 2025-02-12 ENCOUNTER — OFFICE VISIT (OUTPATIENT)
Dept: FAMILY MEDICINE CLINIC | Age: 65
End: 2025-02-12

## 2025-02-12 VITALS
HEART RATE: 64 BPM | BODY MASS INDEX: 27.58 KG/M2 | HEIGHT: 68 IN | DIASTOLIC BLOOD PRESSURE: 78 MMHG | SYSTOLIC BLOOD PRESSURE: 146 MMHG | WEIGHT: 182 LBS

## 2025-02-12 DIAGNOSIS — M25.511 ACUTE PAIN OF RIGHT SHOULDER: ICD-10-CM

## 2025-02-12 DIAGNOSIS — Z95.2 AORTIC VALVE REPLACED: ICD-10-CM

## 2025-02-12 DIAGNOSIS — M54.2 ACUTE NECK PAIN: Primary | ICD-10-CM

## 2025-02-12 SDOH — ECONOMIC STABILITY: FOOD INSECURITY: WITHIN THE PAST 12 MONTHS, THE FOOD YOU BOUGHT JUST DIDN'T LAST AND YOU DIDN'T HAVE MONEY TO GET MORE.: NEVER TRUE

## 2025-02-12 SDOH — ECONOMIC STABILITY: FOOD INSECURITY: WITHIN THE PAST 12 MONTHS, YOU WORRIED THAT YOUR FOOD WOULD RUN OUT BEFORE YOU GOT MONEY TO BUY MORE.: NEVER TRUE

## 2025-02-12 ASSESSMENT — PATIENT HEALTH QUESTIONNAIRE - PHQ9
SUM OF ALL RESPONSES TO PHQ QUESTIONS 1-9: 0
SUM OF ALL RESPONSES TO PHQ QUESTIONS 1-9: 0
SUM OF ALL RESPONSES TO PHQ9 QUESTIONS 1 & 2: 0
2. FEELING DOWN, DEPRESSED OR HOPELESS: NOT AT ALL
SUM OF ALL RESPONSES TO PHQ QUESTIONS 1-9: 0
SUM OF ALL RESPONSES TO PHQ QUESTIONS 1-9: 0
1. LITTLE INTEREST OR PLEASURE IN DOING THINGS: NOT AT ALL

## 2025-02-12 NOTE — PROGRESS NOTES
Visit Information    Have you changed or started any medications since your last visit including any over-the-counter medicines, vitamins, or herbal medicines? no   Are you having any side effects from any of your medications? -  no  Have you stopped taking any of your medications? Is so, why? -  no    Have you seen any other physician or provider since your last visit? Yes - Records Obtained  Have you had any other diagnostic tests since your last visit? Yes - Records Obtained  Have you been seen in the emergency room and/or had an admission to a hospital since we last saw you? Yes - Records Obtained  Have you had your routine dental cleaning in the past 6 months? no    Have you activated your "[employer] account? If not, what are your barriers? Yes     Patient Care Team:  Karen Clark MD as PCP - General (Family Medicine)  Beren[facility] Bell [facility]    Medical History Review  Past Medical, Family, and Social History reviewed and does not contribute to the patient presenting condition    Health Maintenance   Topic Date Due    Shingles vaccine (1 of 2) Never done    Diabetic retinal exam  03/30/2016    Pneumococcal 50+ years Vaccine (2 of 2 - PCV) 06/16/2018    Diabetic foot exam  09/18/2018    Respiratory Syncytial Virus (RSV) Pregnant or age 60 yrs+ (1 - Risk 60-74 years 1-dose series) Never done    Flu vaccine (1) Never done    Colorectal Cancer Screen  08/25/2024    COVID-19 Vaccine (1 - 2024-25 season) Never done    Annual Wellness Visit (Medicare)  09/14/2024    Diabetic Alb to Cr ratio (uACR) test  04/11/2025    Lipids  04/11/2025    GFR test (Diabetes, CKD 3-4, OR last GFR 15-59)  04/11/2025    A1C test (Diabetic or Prediabetic)  11/20/2025    Depression Screen  11/22/2025    DTaP/Tdap/Td vaccine (2 - Td or Tdap) 06/16/2027    Hepatitis C screen  Addressed    HIV screen  Addressed    Hepatitis A vaccine  Aged Out    Hepatitis B vaccine  Aged Out    Hib vaccine  Aged Out    Polio vaccine

## 2025-02-12 NOTE — PROGRESS NOTES
[facility]  [facility]  [address]  [facility] [address]     Date of Visit:  2025  Patient Name: Weston Harkins   Patient :  1960       Weston Harkins is a 64 y.o. male who presents today for an general visit to be evaluated for the following condition(s):  Chief Complaint   Patient presents with    Shoulder Pain     Neck and shoulder pain on right side        Weston is a 65 yo male with history of well controlled T2DM, HTN, Gout, PAD with intermittent claudication, possible thoracic aortic aneurysm, ED, OA of the left hip with Chronic LBP, and peripheral edema p/today for acute visit for follow up of neck and shoulder pain that began on  after shoveling snow.     Regarding his neck and shoulder pain, patient reports pain localized to the right side of his neck, radiating down his right shoulder and scapula. Pain was initially rated as 10/10, has slightly improved since his ER visit on  but remains constant throughout the day, rated as 8-9/10. Pain is exacerbated by movement, particularly twisting, bending, and raising his arm above 90 degrees, and affects his sleep. He initially experienced numbness in the median distribution of his hand, which has since resolved. He denies any recent trauma or falls. Patient was unable to go to work due to the pain and requested a work note.    Regarding his lower back pain and leg swelling, patient reports ongoing issues, primarily when getting out of bed in the morning. These symptoms have been constant but are improving slightly since his ER visit.    We reviewed at length signs and symptoms to proceed to the ER including but not limited to worsening pain, new neurological symptoms, or other concerning symptoms.    Neck Pain   This is a new problem. The current episode started in the past 7 days. The problem occurs constantly. The problem has been gradually improving. Associated with: shoveling snow. The pain is

## 2025-02-12 NOTE — PATIENT INSTRUCTIONS
Thank you for letting us take care of you today. We hope all your questions were addressed. If a question was overlooked or something else comes to mind after you return home, please contact a member of your Care Team listed below.      Your Care Team at [facility] is Team #  Addy Torrez M.D. (Faculty)  Savi Riddle M.D. (Resident)  Rohini Soria M.D. (Resident)   Della Goncalves M.D. (Resident)  Mathew Barber M.D. (Resident)  Elvie Craven M.D. (Resident)  Kaya Yan., Formerly [facility]  Barb Carrillo, [facility]  Ariana Blackwell, [facility]  Jose Manuel Hensley, [facility]  Kristy Sanchez, [facility]  Sonia Buckley, [facility]  Thalia Gabriel (LJ) KAYY Qureshi (Clinical Practice Manager)  Ekaterina Sutton [facility] (Clinical Pharmacist)     Office phone number: [phone]    If you need to get in right away due to illness, please be advised we have \"Same Day\" appointments available Monday-Friday. Please call us at [phone] option #3 to schedule your \"Same Day\" appointment.

## 2025-02-22 PROBLEM — Z95.2 AORTIC VALVE REPLACED: Status: ACTIVE | Noted: 2025-02-22

## 2025-02-22 PROBLEM — M54.2 ACUTE NECK PAIN: Status: ACTIVE | Noted: 2025-02-22

## 2025-03-07 ENCOUNTER — OFFICE VISIT (OUTPATIENT)
Dept: FAMILY MEDICINE CLINIC | Age: 65
End: 2025-03-07

## 2025-03-07 VITALS
HEART RATE: 89 BPM | SYSTOLIC BLOOD PRESSURE: 161 MMHG | TEMPERATURE: 98.3 F | WEIGHT: 180 LBS | BODY MASS INDEX: 27.28 KG/M2 | HEIGHT: 68 IN | DIASTOLIC BLOOD PRESSURE: 78 MMHG

## 2025-03-07 DIAGNOSIS — I10 ESSENTIAL HYPERTENSION: ICD-10-CM

## 2025-03-07 DIAGNOSIS — M54.50 CHRONIC BILATERAL LOW BACK PAIN WITHOUT SCIATICA: Chronic | ICD-10-CM

## 2025-03-07 DIAGNOSIS — Z00.00 MEDICARE ANNUAL WELLNESS VISIT, SUBSEQUENT: Primary | ICD-10-CM

## 2025-03-07 DIAGNOSIS — M54.2 ACUTE NECK PAIN: ICD-10-CM

## 2025-03-07 DIAGNOSIS — Z12.11 SCREENING FOR COLON CANCER: ICD-10-CM

## 2025-03-07 DIAGNOSIS — G89.29 CHRONIC BILATERAL LOW BACK PAIN WITHOUT SCIATICA: Chronic | ICD-10-CM

## 2025-03-07 RX ORDER — CYCLOBENZAPRINE HCL 10 MG
10 TABLET ORAL 3 TIMES DAILY PRN
Qty: 21 TABLET | Refills: 0 | Status: SHIPPED | OUTPATIENT
Start: 2025-03-07 | End: 2025-03-17

## 2025-03-07 RX ORDER — CYCLOBENZAPRINE HCL 10 MG
10 TABLET ORAL 3 TIMES DAILY PRN
Qty: 21 TABLET | Refills: 0 | Status: SHIPPED | OUTPATIENT
Start: 2025-03-07 | End: 2025-03-07

## 2025-03-07 ASSESSMENT — LIFESTYLE VARIABLES: HOW MANY STANDARD DRINKS CONTAINING ALCOHOL DO YOU HAVE ON A TYPICAL DAY: 1 OR 2

## 2025-03-07 ASSESSMENT — PATIENT HEALTH QUESTIONNAIRE - PHQ9
SUM OF ALL RESPONSES TO PHQ QUESTIONS 1-9: 0
2. FEELING DOWN, DEPRESSED OR HOPELESS: NOT AT ALL
SUM OF ALL RESPONSES TO PHQ QUESTIONS 1-9: 0
1. LITTLE INTEREST OR PLEASURE IN DOING THINGS: NOT AT ALL

## 2025-03-07 NOTE — PATIENT INSTRUCTIONS
instruction, always ask your healthcare professional. [employer] disclaims any warranty or liability for your use of this information.         Learning About Managing Acute Pain at Home  What is a pain management plan?     A pain management plan spells out ways you can deal with your pain at home. You and your care team will create this plan before you leave the hospital. The plan may include:  The goals of your treatment. This may include how you can expect your pain and function to improve.  The treatments your doctor suggests for your pain. These may include medicines, physical therapy, or relaxation exercises.  Notes about how you and your care team will work together as you recover. Your team may include your doctor, a physical therapist, and an occupational therapist.  A review of your treatment goals for pain and function.  Your feelings about how you want to manage your pain are important. Be open and honest when you talk with your doctor. This will help ensure that you get a plan that is safe and that works best for you.  Why is it important to follow your plan?  After you have an injury or surgery, a certain amount of pain is common and normal. But you can manage your pain after you leave the hospital.  The best way to do that is to follow your pain management plan. This will help keep you comfortable and able to do the things you want to do. It can also speed your recovery and help reduce the risk of problems.  What are the side effects of pain medicines?  All pain medicines--like acetaminophen, nonsteroidal anti-inflammatory drugs, and opioids--have side effects, including allergic reaction, rash, and upset stomach. Common side effects of opioids also include constipation and being sleepy. More serious effects include needing larger doses over time, getting sick if you stop taking the drug, developing opioid use disorder, and death.  How do you manage pain after you leave the

## 2025-03-07 NOTE — PROGRESS NOTES
Attending Physician Statement  I  have discussed the care of Weston Harkins including pertinent history and exam findings with the resident. I agree with the assessment, plan and orders as documented by the resident.      BP (!) 161/78   Pulse 89   Temp 98.3 °F (36.8 °C) (Oral)   Ht 1.727 m (5' 8\")   Wt 81.6 kg (180 lb)   BMI 27.37 kg/m²    BP Readings from Last 3 Encounters:   03/07/25 (!) 161/78   02/12/25 (!) 146/78   11/22/24 128/72     Wt Readings from Last 3 Encounters:   03/07/25 81.6 kg (180 lb)   02/12/25 82.6 kg (182 lb)   11/22/24 81.9 kg (180 lb 9.6 oz)          Diagnosis Orders   1. Medicare annual wellness visit, subsequent        2. Screening for colon cancer  [facility] [place]      3. Acute neck pain  [facility]    cyclobenzaprine (FLEXERIL) 10 MG tablet    DISCONTINUED: cyclobenzaprine (FLEXERIL) 10 MG tablet      4. Chronic bilateral low back pain without sciatica  [facility]    cyclobenzaprine (FLEXERIL) 10 MG tablet    DISCONTINUED: cyclobenzaprine (FLEXERIL) 10 MG tablet      5. Essential hypertension                Oliver Donaldson DO 3/7/2025 2:49 PM      
Visit Information    Have you changed or started any medications since your last visit including any over-the-counter medicines, vitamins, or herbal medicines? no   Have you stopped taking any of your medications? Is so, why? -  no  Are you having any side effects from any of your medications? - no    Have you seen any other physician or provider since your last visit?  no   Have you had any other diagnostic tests since your last visit?  no   Have you been seen in the emergency room and/or had an admission in a hospital since we last saw you?  no   Have you had your routine dental cleaning in the past 6 months?  no     Do you have an active MyChart account? If no, what is the barrier?  Yes    Patient Care Team:  Karen Clark MD as PCP - General (Family Medicine)  Addy Torrez MD as PCP - Empaneled Provider    Medical History Review  Past Medical, Family, and Social History reviewed and does not contribute to the patient presenting condition    Health Maintenance   Topic Date Due    Shingles vaccine (1 of 2) Never done    Diabetic retinal exam  03/30/2016    Pneumococcal 50+ years Vaccine (2 of 2 - PCV) 06/16/2018    Diabetic foot exam  09/18/2018    Respiratory Syncytial Virus (RSV) Pregnant or age 60 yrs+ (1 - Risk 60-74 years 1-dose series) Never done    Flu vaccine (1) Never done    Colorectal Cancer Screen  08/25/2024    COVID-19 Vaccine (1 - 2024-25 season) Never done    Annual Wellness Visit (Medicare)  09/14/2024    Diabetic Alb to Cr ratio (uACR) test  04/11/2025    Lipids  04/11/2025    GFR test (Diabetes, CKD 3-4, OR last GFR 15-59)  04/11/2025    A1C test (Diabetic or Prediabetic)  11/20/2025    Depression Screen  02/12/2026    DTaP/Tdap/Td vaccine (2 - Td or Tdap) 06/16/2027    Hepatitis C screen  Addressed    HIV screen  Addressed    Hepatitis A vaccine  Aged Out    Hepatitis B vaccine  Aged Out    Hib vaccine  Aged Out    Polio vaccine  Aged Out    Meningococcal (ACWY) vaccine  Aged Out    
DAY Yes Deondre Lopez MD   glipiZIDE (GLUCOTROL XL) 10 MG extended release tablet TAKE 1 TABLET BY MOUTH TWICE DAILY Yes Deondre Lopez MD   hydrALAZINE (APRESOLINE) 10 MG tablet TAKE 1 TABLET BY MOUTH 3 TIMES DAILY Yes Deondre Lopez MD   LANCETS ULTRA THIN MISC 1 each by Does not apply route as needed (blood glc check) Yes Lonnie Mayer MD   Alcohol Swabs (ALCOHOL PREP) PADS 1 each by Does not apply route as needed (glc check) Yes Lonnie Mayer MD   glucose blood VI test strips (ASCENSIA AUTODISC VI;ONE TOUCH ULTRA TEST VI) strip Dx: DM type 2, use daily with insulin Yes Lonnie Mayer MD   ketorolac (TORADOL) 10 MG tablet Take 1 tablet by mouth every 6 hours as needed for Pain  Patient not taking: Reported on 11/22/2024  Eva Bowles MD       [facility] (Including outside providers/suppliers regularly involved in providing care):   Patient Care Team:  Karen Clark MD as PCP - General (Family Medicine)  Addy Torrez MD as PCP - Empaneled Provider     Recommendations for Preventive Services Due: see orders and patient instructions/AVS.  Recommended screening schedule for the next 5-10 years is provided to the patient in written form: see Patient Instructions/AVS.     Reviewed and updated this visit:  Tobacco  Allergies  Meds

## 2025-03-19 ENCOUNTER — OFFICE VISIT (OUTPATIENT)
Dept: SURGERY | Age: 65
End: 2025-03-19
Payer: MEDICARE

## 2025-03-19 VITALS
HEIGHT: 66 IN | HEART RATE: 72 BPM | WEIGHT: 187.6 LBS | BODY MASS INDEX: 30.15 KG/M2 | OXYGEN SATURATION: 95 % | SYSTOLIC BLOOD PRESSURE: 153 MMHG | DIASTOLIC BLOOD PRESSURE: 81 MMHG

## 2025-03-19 DIAGNOSIS — Z12.11 SCREENING FOR COLON CANCER: Primary | ICD-10-CM

## 2025-03-19 PROCEDURE — 3077F SYST BP >= 140 MM HG: CPT | Performed by: STUDENT IN AN ORGANIZED HEALTH CARE EDUCATION/TRAINING PROGRAM

## 2025-03-19 PROCEDURE — 3079F DIAST BP 80-89 MM HG: CPT | Performed by: STUDENT IN AN ORGANIZED HEALTH CARE EDUCATION/TRAINING PROGRAM

## 2025-03-19 PROCEDURE — 99205 OFFICE O/P NEW HI 60 MIN: CPT | Performed by: STUDENT IN AN ORGANIZED HEALTH CARE EDUCATION/TRAINING PROGRAM

## 2025-03-19 RX ORDER — POLYETHYLENE GLYCOL 3350 17 G/17G
17 POWDER, FOR SOLUTION ORAL DAILY
Qty: 510 G | Refills: 0 | Status: SHIPPED | OUTPATIENT
Start: 2025-03-19 | End: 2025-04-18

## 2025-03-19 NOTE — PROGRESS NOTES
Visit Information    Have you changed or started any medications since your last visit including any over-the-counter medicines, vitamins, or herbal medicines? no   Have you stopped taking any of your medications? Is so, why? -  no  Are you having any side effects from any of your medications? - no    Have you seen any other physician or provider since your last visit?  no   Have you had any other diagnostic tests since your last visit?  no   Have you been seen in the emergency room and/or had an admission in a hospital since we last saw you?  no   Have you had your routine dental cleaning in the past 6 months?  no     Do you have an active MyChart account? If no, what is the barrier?  Yes    Patient Care Team:  Karen Clark MD as PCP - General (Family Medicine)  Addy Torrez MD as PCP - Empaneled Provider  Jonel Boggs IV, DO as Consulting Physician (General Surgery)    Medical History Review  Past Medical, Family, and Social History reviewed and does not contribute to the patient presenting condition    Health Maintenance   Topic Date Due    Shingles vaccine (1 of 2) Never done    Diabetic retinal exam  03/30/2016    Pneumococcal 50+ years Vaccine (2 of 2 - PCV) 06/16/2018    Diabetic foot exam  09/18/2018    Respiratory Syncytial Virus (RSV) Pregnant or age 60 yrs+ (1 - Risk 60-74 years 1-dose series) Never done    Colorectal Cancer Screen  08/25/2024    COVID-19 Vaccine (1 - 2024-25 season) Never done    Diabetic Alb to Cr ratio (uACR) test  04/11/2025    Lipids  04/11/2025    GFR test (Diabetes, CKD 3-4, OR last GFR 15-59)  04/11/2025    Flu vaccine (1) 03/07/2026 (Originally 8/1/2024)    A1C test (Diabetic or Prediabetic)  11/20/2025    Depression Screen  03/07/2026    Annual Wellness Visit (Medicare)  03/08/2026    DTaP/Tdap/Td vaccine (2 - Td or Tdap) 06/16/2027    Hepatitis C screen  Addressed    HIV screen  Addressed    Hepatitis A vaccine  Aged Out    Hepatitis B vaccine  Aged Out

## 2025-03-19 NOTE — PATIENT INSTRUCTIONS
Thank you letting us take care of you today. We hope that all your questions were addressed. If a question was overlooked or something else comes to mind after you return home, please call our office at [phone].    If you need to cancel or change an appointment, surgery or procedure, please contact the office at [phone].

## 2025-03-19 NOTE — PROGRESS NOTES
History and Physical  [facility]    Patient's Name/Date of Birth: Weston Harkins / 1960 (64 y.o.)    Date: March 19, 2025     HPI: Pt is a 64 y.o. male who presents to [facility] for evaluation for screening colonoscopy. Last colonoscopy was in 2014. Two polyps were removed at that time, but were benign. He denies any changes to his bowel movements. No constipation, diarrhea or blood in stool. He is tolerating a regular diet. No recent nausea or vomiting. No personal or family history of colon cancer or inflammatory bowel disease. States he is supposed to be taking an 81 mg ASA daily, but has not been taking them. No tobacco or ETOH history.       Past Medical History:   Diagnosis Date    Arthritis     general    Cerebral artery occlusion with cerebral infarction (HCC)     mild    Chronic back pain     Elbow pain, chronic     Left    Full dentures     Fungus infection     bilat. feet    Hyperlipidemia     Hypertension     Thoracic aortic aneurysm diagnosed  2010 at [facility]     Type II or unspecified type diabetes mellitus without mention of complication, not stated as uncontrolled        Past Surgical History:   Procedure Laterality Date    COLONOSCOPY  8/25/14    biopsy    DENTAL SURGERY      all teeth removed    ELBOW ARTHROSCOPY Right 02/21/2018    with loose body removal    NC ARTHROSCOPY ELBOW DIAG W/WO SYNOVIAL BIOPSY SPX Left 2/21/2018    ELBOW ARTHROSCOPY, REMOVAL OF LOOSE BODY AND DEBRIDEMENT (ARTHREX, NORMAL-SCOPE EQUIPMENT, ARM RYAN, LATERAL W/BEAN BAG, 3080 TABLE, SIMPLE SLING) performed by Galileo Su DO at [facility] OR    THORACIC AORTIC ANEURYSM REPAIR  march 14th 2012    AORTIC ANEURYSM REPAIR       Current Outpatient Medications   Medication Sig Dispense Refill    metoprolol (LOPRESSOR) 100 MG tablet Take 0.5 tablets by mouth 2 times daily TAKE ONE (1) TABLET BY MOUTH TWICE DAILY 60 tablet 11    simvastatin (ZOCOR) 40 MG tablet TAKE 1 TABLET BY MOUTH ONCE DAILY 30

## 2025-03-23 DIAGNOSIS — M54.50 CHRONIC BILATERAL LOW BACK PAIN WITHOUT SCIATICA: Chronic | ICD-10-CM

## 2025-03-23 DIAGNOSIS — M54.2 ACUTE NECK PAIN: ICD-10-CM

## 2025-03-23 DIAGNOSIS — G89.29 CHRONIC BILATERAL LOW BACK PAIN WITHOUT SCIATICA: Chronic | ICD-10-CM

## 2025-03-24 RX ORDER — CYCLOBENZAPRINE HCL 10 MG
TABLET ORAL
Qty: 21 TABLET | Refills: 10 | OUTPATIENT
Start: 2025-03-24

## 2025-03-24 NOTE — TELEPHONE ENCOUNTER
Last visit: 03/07/2025  Last Med refill:   Does patient have enough medication for 72 hours: No:     Next Visit Date:  Future Appointments   Date Time Provider Department Center   3/27/2025  1:40 PM Heriberto Hernandez MD [facility]   Topic Date Due    Shingles vaccine (1 of 2) Never done    Diabetic retinal exam  03/30/2016    Pneumococcal 50+ years Vaccine (2 of 2 - PCV) 06/16/2018    Diabetic foot exam  09/18/2018    Respiratory Syncytial Virus (RSV) Pregnant or age 60 yrs+ (1 - Risk 60-74 years 1-dose series) Never done    Colorectal Cancer Screen  08/25/2024    COVID-19 Vaccine (1 - 2024-25 season) Never done    Diabetic Alb to Cr ratio (uACR) test  04/11/2025    Lipids  04/11/2025    GFR test (Diabetes, CKD 3-4, OR last GFR 15-59)  04/11/2025    Flu vaccine (1) 03/07/2026 (Originally 8/1/2024)    A1C test (Diabetic or Prediabetic)  11/20/2025    Depression Screen  03/07/2026    Annual Wellness Visit (Medicare)  03/08/2026    DTaP/Tdap/Td vaccine (2 - Td or Tdap) 06/16/2027    Hepatitis C screen  Addressed    HIV screen  Addressed    Hepatitis A vaccine  Aged Out    Hepatitis B vaccine  Aged Out    Hib vaccine  Aged Out    Polio vaccine  Aged Out    Meningococcal (ACWY) vaccine  Aged Out    Meningococcal B vaccine  Aged Out    Pneumococcal 0-49 years Vaccine  Discontinued       Hemoglobin A1C (%)   Date Value   11/20/2024 5.8   04/11/2024 6.0   05/09/2023 7.1             ( goal A1C is < 7)   No components found for: \"LABMICR\"  No components found for: \"LDLCHOLESTEROL\", \"LDLCALC\"    (goal LDL is <100)   AST (U/L)   Date Value   08/26/2021 10     ALT (U/L)   Date Value   08/26/2021 10     BUN (mg/dL)   Date Value   04/11/2024 29 (H)     BP Readings from Last 3 Encounters:   03/19/25 (!) 153/81   03/07/25 (!) 161/78   02/12/25 (!) 146/78          (goal 120/80)    All Future Testing planned in [facility]  Lab Frequency Next Occurrence   Baseline Diagnostic Sleep Study Once

## 2025-03-27 ENCOUNTER — HOSPITAL ENCOUNTER (OUTPATIENT)
Dept: PAIN MANAGEMENT | Age: 65
Discharge: HOME | End: 2025-03-27
Payer: MEDICARE

## 2025-03-27 VITALS — HEIGHT: 66 IN | WEIGHT: 187 LBS | BODY MASS INDEX: 30.05 KG/M2

## 2025-03-27 DIAGNOSIS — G89.29 CHRONIC BILATERAL LOW BACK PAIN WITHOUT SCIATICA: Primary | Chronic | ICD-10-CM

## 2025-03-27 DIAGNOSIS — M54.2 NECK PAIN: ICD-10-CM

## 2025-03-27 DIAGNOSIS — M54.50 CHRONIC BILATERAL LOW BACK PAIN WITHOUT SCIATICA: Primary | Chronic | ICD-10-CM

## 2025-03-27 PROCEDURE — 99204 OFFICE O/P NEW MOD 45 MIN: CPT | Performed by: ANESTHESIOLOGY

## 2025-03-27 PROCEDURE — 99215 OFFICE O/P EST HI 40 MIN: CPT

## 2025-03-27 PROCEDURE — 99213 OFFICE O/P EST LOW 20 MIN: CPT

## 2025-03-27 RX ORDER — NORTRIPTYLINE HYDROCHLORIDE 25 MG/1
25 CAPSULE ORAL NIGHTLY
Qty: 30 CAPSULE | Refills: 1 | Status: SHIPPED | OUTPATIENT
Start: 2025-03-27

## 2025-03-27 RX ORDER — GABAPENTIN 100 MG/1
100 CAPSULE ORAL 2 TIMES DAILY
Qty: 60 CAPSULE | Refills: 1 | Status: SHIPPED | OUTPATIENT
Start: 2025-03-27 | End: 2025-09-23

## 2025-03-27 ASSESSMENT — PAIN DESCRIPTION - ORIENTATION: ORIENTATION: LOWER;LEFT;RIGHT

## 2025-03-27 ASSESSMENT — ENCOUNTER SYMPTOMS
GASTROINTESTINAL NEGATIVE: 1
RESPIRATORY NEGATIVE: 1
BACK PAIN: 1

## 2025-03-27 ASSESSMENT — PAIN DESCRIPTION - DESCRIPTORS: DESCRIPTORS: ACHING

## 2025-03-27 ASSESSMENT — PAIN DESCRIPTION - PAIN TYPE: TYPE: CHRONIC PAIN

## 2025-03-27 ASSESSMENT — PAIN DESCRIPTION - LOCATION: LOCATION: BACK;NECK

## 2025-03-27 ASSESSMENT — PAIN DESCRIPTION - FREQUENCY: FREQUENCY: CONTINUOUS

## 2025-03-27 NOTE — PROGRESS NOTES
The patient is a 64 y.o.Non- / non  male.    Chief Complaint   Patient presents with    New Patient    Back Pain    Neck Pain      This is 64-year-old gentleman with history of neck and back pain    Neck pain  Neck pain is subacute going on for almost 6 to 7 weeks, located over the axial cervical spine right side more than left extends over the trapezius to the shoulder  No particular injury associated with this onset of pain, started on its own when he woke up 1 morning  Aggravates with neck movement describes it as deep aching stiffness  No dermatomal radiation of pain  No associated dermatomal numbness or paresthesia  No changes in bladder or bowel control  No previous physical therapy  No previous MRI  No previous cervical spine interventional procedures surgical history    Back pain  Chronic going on for 5+ years located in the lower lumbosacral area across midline on both sides  Describes the pain as constant aggravated with routine activity interfering with quality of life, symptoms are progressively worsening  Last physical therapy more than 2 years ago  Had previous CT scan and x-rays that showed lower lumbar and lumbar spine advanced degenerative disc changes  No previous lumbar spine MRI  No previous lumbar spine interventional procedure or surgical history  Have tried NSAIDs and muscle relaxant in past without much benefit      Patient is here today for: Neck and Back Pain   Pain level: 7   Character: aching, burning   Radiating: right shoulder   Weakness or numbness: n/a   Aggravating Factors: riding in the car, sleeping, turning   Alleviating Factors: over the counter pain medication, muscle relaxant   Constant or intermitting: constant   Bladder/bowel loss: no           Past Medical History:   Diagnosis Date    Arthritis     general    CAD (coronary artery disease)     Cerebral artery occlusion with cerebral infarction (HCC)     mild    Chronic back pain     Elbow pain, chronic     Left

## 2025-03-28 ENCOUNTER — ANESTHESIA EVENT (OUTPATIENT)
Dept: OPERATING ROOM | Age: 65
End: 2025-03-28
Payer: MEDICARE

## 2025-03-28 NOTE — PRE-PROCEDURE INSTRUCTIONS
VM left with pre-colonoscopy instructions:     Date/time/location of procedure     NPO after MN status     Need for       Need to complete bowel prep/instructions per Dr. Boggs      Instructed pt to take BP meds (no diuretics) with a small sip of water prior to procedure.  Instructed pt to hold all diabetic meds/insulin the day of procedure.    [facility] phone number [phone] provided for pt to call with any further questions prior to procedure.

## 2025-03-31 ENCOUNTER — ANESTHESIA (OUTPATIENT)
Dept: OPERATING ROOM | Age: 65
End: 2025-03-31
Payer: MEDICARE

## 2025-03-31 ENCOUNTER — HOSPITAL ENCOUNTER (OUTPATIENT)
Age: 65
Setting detail: OUTPATIENT SURGERY
Discharge: HOME | End: 2025-03-31
Attending: SURGERY | Admitting: SURGERY
Payer: MEDICARE

## 2025-03-31 VITALS
TEMPERATURE: 97.9 F | BODY MASS INDEX: 27.01 KG/M2 | OXYGEN SATURATION: 93 % | RESPIRATION RATE: 16 BRPM | HEART RATE: 65 BPM | HEIGHT: 68 IN | DIASTOLIC BLOOD PRESSURE: 80 MMHG | WEIGHT: 178.2 LBS | SYSTOLIC BLOOD PRESSURE: 154 MMHG

## 2025-03-31 DIAGNOSIS — M54.50 CHRONIC BILATERAL LOW BACK PAIN WITHOUT SCIATICA: Chronic | ICD-10-CM

## 2025-03-31 DIAGNOSIS — M54.2 ACUTE NECK PAIN: ICD-10-CM

## 2025-03-31 DIAGNOSIS — Z12.11 SCREEN FOR COLON CANCER: ICD-10-CM

## 2025-03-31 DIAGNOSIS — G89.29 CHRONIC BILATERAL LOW BACK PAIN WITHOUT SCIATICA: Chronic | ICD-10-CM

## 2025-03-31 LAB — GLUCOSE BLD-MCNC: 138 MG/DL (ref 75–110)

## 2025-03-31 PROCEDURE — 6360000002 HC RX W HCPCS: Performed by: NURSE ANESTHETIST, CERTIFIED REGISTERED

## 2025-03-31 PROCEDURE — 82947 ASSAY GLUCOSE BLOOD QUANT: CPT

## 2025-03-31 PROCEDURE — 88305 TISSUE EXAM BY PATHOLOGIST: CPT

## 2025-03-31 PROCEDURE — 3700000001 HC ADD 15 MINUTES (ANESTHESIA): Performed by: SURGERY

## 2025-03-31 PROCEDURE — 2709999900 HC NON-CHARGEABLE SUPPLY: Performed by: SURGERY

## 2025-03-31 PROCEDURE — 2580000003 HC RX 258: Performed by: STUDENT IN AN ORGANIZED HEALTH CARE EDUCATION/TRAINING PROGRAM

## 2025-03-31 PROCEDURE — 3700000000 HC ANESTHESIA ATTENDED CARE: Performed by: SURGERY

## 2025-03-31 PROCEDURE — 3609010400 HC COLONOSCOPY POLYPECTOMY HOT BIOPSY: Performed by: SURGERY

## 2025-03-31 PROCEDURE — 7100000010 HC PHASE II RECOVERY - FIRST 15 MIN: Performed by: SURGERY

## 2025-03-31 PROCEDURE — 7100000011 HC PHASE II RECOVERY - ADDTL 15 MIN: Performed by: SURGERY

## 2025-03-31 RX ORDER — NALOXONE HYDROCHLORIDE 0.4 MG/ML
INJECTION, SOLUTION INTRAMUSCULAR; INTRAVENOUS; SUBCUTANEOUS PRN
Status: DISCONTINUED | OUTPATIENT
Start: 2025-03-31 | End: 2025-03-31 | Stop reason: HOSPADM

## 2025-03-31 RX ORDER — SODIUM CHLORIDE 9 MG/ML
INJECTION, SOLUTION INTRAVENOUS PRN
Status: DISCONTINUED | OUTPATIENT
Start: 2025-03-31 | End: 2025-03-31 | Stop reason: HOSPADM

## 2025-03-31 RX ORDER — PROPOFOL 10 MG/ML
INJECTION, EMULSION INTRAVENOUS
Status: DISCONTINUED | OUTPATIENT
Start: 2025-03-31 | End: 2025-03-31 | Stop reason: SDUPTHER

## 2025-03-31 RX ORDER — MIDAZOLAM HYDROCHLORIDE 2 MG/2ML
2 INJECTION, SOLUTION INTRAMUSCULAR; INTRAVENOUS
Status: DISCONTINUED | OUTPATIENT
Start: 2025-03-31 | End: 2025-03-31 | Stop reason: HOSPADM

## 2025-03-31 RX ORDER — SODIUM CHLORIDE 0.9 % (FLUSH) 0.9 %
5-40 SYRINGE (ML) INJECTION EVERY 12 HOURS SCHEDULED
Status: DISCONTINUED | OUTPATIENT
Start: 2025-03-31 | End: 2025-03-31 | Stop reason: HOSPADM

## 2025-03-31 RX ORDER — METOCLOPRAMIDE HYDROCHLORIDE 5 MG/ML
10 INJECTION INTRAMUSCULAR; INTRAVENOUS
Status: DISCONTINUED | OUTPATIENT
Start: 2025-03-31 | End: 2025-03-31 | Stop reason: HOSPADM

## 2025-03-31 RX ORDER — MORPHINE SULFATE 2 MG/ML
2 INJECTION, SOLUTION INTRAMUSCULAR; INTRAVENOUS EVERY 5 MIN PRN
Status: DISCONTINUED | OUTPATIENT
Start: 2025-03-31 | End: 2025-03-31 | Stop reason: HOSPADM

## 2025-03-31 RX ORDER — GLYCOPYRROLATE 0.2 MG/ML
0.4 INJECTION INTRAMUSCULAR; INTRAVENOUS ONCE
Status: DISCONTINUED | OUTPATIENT
Start: 2025-03-31 | End: 2025-03-31 | Stop reason: HOSPADM

## 2025-03-31 RX ORDER — LIDOCAINE HYDROCHLORIDE 10 MG/ML
1 INJECTION, SOLUTION EPIDURAL; INFILTRATION; INTRACAUDAL; PERINEURAL
Status: DISCONTINUED | OUTPATIENT
Start: 2025-03-31 | End: 2025-03-31 | Stop reason: HOSPADM

## 2025-03-31 RX ORDER — LABETALOL HYDROCHLORIDE 5 MG/ML
10 INJECTION, SOLUTION INTRAVENOUS
Status: DISCONTINUED | OUTPATIENT
Start: 2025-03-31 | End: 2025-03-31 | Stop reason: HOSPADM

## 2025-03-31 RX ORDER — SODIUM CHLORIDE 0.9 % (FLUSH) 0.9 %
5-40 SYRINGE (ML) INJECTION PRN
Status: DISCONTINUED | OUTPATIENT
Start: 2025-03-31 | End: 2025-03-31 | Stop reason: HOSPADM

## 2025-03-31 RX ORDER — DIPHENHYDRAMINE HYDROCHLORIDE 50 MG/ML
12.5 INJECTION, SOLUTION INTRAMUSCULAR; INTRAVENOUS
Status: DISCONTINUED | OUTPATIENT
Start: 2025-03-31 | End: 2025-03-31 | Stop reason: HOSPADM

## 2025-03-31 RX ORDER — ONDANSETRON 2 MG/ML
4 INJECTION INTRAMUSCULAR; INTRAVENOUS
Status: DISCONTINUED | OUTPATIENT
Start: 2025-03-31 | End: 2025-03-31 | Stop reason: HOSPADM

## 2025-03-31 RX ORDER — IPRATROPIUM BROMIDE AND ALBUTEROL SULFATE 2.5; .5 MG/3ML; MG/3ML
1 SOLUTION RESPIRATORY (INHALATION)
Status: DISCONTINUED | OUTPATIENT
Start: 2025-03-31 | End: 2025-03-31 | Stop reason: HOSPADM

## 2025-03-31 RX ORDER — MEPERIDINE HYDROCHLORIDE 50 MG/ML
12.5 INJECTION INTRAMUSCULAR; INTRAVENOUS; SUBCUTANEOUS EVERY 5 MIN PRN
Status: DISCONTINUED | OUTPATIENT
Start: 2025-03-31 | End: 2025-03-31 | Stop reason: HOSPADM

## 2025-03-31 RX ORDER — OXYCODONE AND ACETAMINOPHEN 5; 325 MG/1; MG/1
1 TABLET ORAL
Status: DISCONTINUED | OUTPATIENT
Start: 2025-03-31 | End: 2025-03-31 | Stop reason: HOSPADM

## 2025-03-31 RX ORDER — HYDRALAZINE HYDROCHLORIDE 20 MG/ML
10 INJECTION INTRAMUSCULAR; INTRAVENOUS
Status: DISCONTINUED | OUTPATIENT
Start: 2025-03-31 | End: 2025-03-31 | Stop reason: HOSPADM

## 2025-03-31 RX ORDER — LIDOCAINE HYDROCHLORIDE 10 MG/ML
INJECTION, SOLUTION INFILTRATION; PERINEURAL
Status: DISCONTINUED | OUTPATIENT
Start: 2025-03-31 | End: 2025-03-31 | Stop reason: SDUPTHER

## 2025-03-31 RX ORDER — SODIUM CHLORIDE, SODIUM LACTATE, POTASSIUM CHLORIDE, CALCIUM CHLORIDE 600; 310; 30; 20 MG/100ML; MG/100ML; MG/100ML; MG/100ML
INJECTION, SOLUTION INTRAVENOUS CONTINUOUS
Status: DISCONTINUED | OUTPATIENT
Start: 2025-03-31 | End: 2025-03-31 | Stop reason: HOSPADM

## 2025-03-31 RX ORDER — OXYCODONE AND ACETAMINOPHEN 5; 325 MG/1; MG/1
2 TABLET ORAL
Status: DISCONTINUED | OUTPATIENT
Start: 2025-03-31 | End: 2025-03-31 | Stop reason: HOSPADM

## 2025-03-31 RX ADMIN — SODIUM CHLORIDE: 9 INJECTION, SOLUTION INTRAVENOUS at 13:43

## 2025-03-31 RX ADMIN — PROPOFOL 150 MCG/KG/MIN: 10 INJECTION, EMULSION INTRAVENOUS at 13:46

## 2025-03-31 RX ADMIN — LIDOCAINE HYDROCHLORIDE 50 MG: 10 INJECTION, SOLUTION EPIDURAL; INFILTRATION; INTRACAUDAL; PERINEURAL at 13:46

## 2025-03-31 RX ADMIN — PROPOFOL 100 MG: 10 INJECTION, EMULSION INTRAVENOUS at 13:46

## 2025-03-31 ASSESSMENT — PAIN - FUNCTIONAL ASSESSMENT
PAIN_FUNCTIONAL_ASSESSMENT: 0-10
PAIN_FUNCTIONAL_ASSESSMENT: NONE - DENIES PAIN

## 2025-03-31 NOTE — H&P
facility-administered medications for this visit.            Allergies   No Known Allergies        Family History         Family History   Problem Relation Age of Onset    High Blood Pressure Mother      Diabetes Mother      Cancer Father      Heart Disease Father      Stroke Brother      Cirrhosis Brother              Social History   []Expand by Default            Socioeconomic History    Marital status:        Spouse name: Not on file    Number of children: Not on file    Years of education: Not on file    Highest education level: Not on file   Occupational History    Not on file   Tobacco Use    Smoking status: Former       Current packs/day: 0.00       Average packs/day: 0.3 packs/day for 10.0 years (2.5 ttl pk-yrs)       Types: Cigarettes       Start date: 10/1/1988       Quit date: 10/1/1998       Years since quittin.4       Passive exposure: Past    Smokeless tobacco: Never   Vaping Use    Vaping status: Never Used   Substance and Sexual Activity    Alcohol use: Yes       Alcohol/week: 3.0 standard drinks of alcohol       Types: 3 Cans of beer per week       Comment: every weekend    Drug use: Yes       Types: Marijuana (Weed)       Comment: last use 2017    Sexual activity: Yes       Partners: Female   Other Topics Concern    Not on file   Social History Narrative    Not on file      Social Drivers of Health           Financial Resource Strain: Low Risk  (2024)     Overall Financial Resource Strain (CARDIA)      Difficulty of Paying Living Expenses: Not hard at all   Food Insecurity: No Food Insecurity (2025)     Hunger Vital Sign      Worried About Running Out of Food in the Last Year: Never true      Ran Out of Food in the Last Year: Never true   Transportation Needs: No Transportation Needs (2025)     PRAPARE - Transportation      Lack of Transportation (Medical): No      Lack of Transportation (Non-Medical): No   Physical Activity: Sufficiently Active (3/7/2025)

## 2025-03-31 NOTE — OP NOTE
Operative Note      Patient: Weston Harkins  YOB: 1960  MRN: [id number]    Date of Procedure: 3/31/2025    Pre-Op Diagnosis Codes:      * Screen for colon cancer [Z12.11]    Post-Op Diagnosis: Same       Procedure(s):  COLONOSCOPY WITH TRANSVERSE COLON POLYPECTOMY BIOPSY    Surgeon(s):  Jonel Boggs IV, DO    Assistant:   Meera Elizabeth DO PGY-5    Anesthesia: Monitor Anesthesia Care    Estimated Blood Loss (mL): Minimal    Complications: None    Specimens:   ID Type Source Tests Collected by Time Destination   A : TRANSVERSE COLON POLYP Tissue Tissue SURGICAL PATHOLOGY Jonel Boggs IV, DO 3/31/2025 1424        Implants:  * No implants in log *      Drains: * No LDAs found *    Findings:  Infection Present At Time Of Surgery (PATOS) (choose all levels that have infection present):  No infection present  Other Findings: Transverse colon polyp. Diverticular disease ascending and sigmoid.     Indications:   Patient is a 64 year old male who presents to undergo colonoscopy. His last colonoscopy was in 2014. He reports he had benign polyps removed at that time. Denies any changes to bowel habits. Decision was made to proceed with colonoscopy, possible biopsy, possible polypectomy.  The risk, benefits, and alternatives to the procedure were discussed with the patient, and all questions were answered.  Written consent was obtained and witnessed.    Detailed Description of Procedure:   The patient was given monitored anesthesia care. The patient was given oxygen by nasal cannula. The colonoscope was inserted per rectum and advanced under direct vision to the cecum with some difficulty due to fair prep.     Findings:  Cecum/Ascending colon: Few ascending diverticula, otherwise normal.    Transverse colon: Polyp removed with hot snare.  Otherwise normal.    Descending/Sigmoid colon: Few sigmoid diverticula, and otherwise normal.    Rectum/Anus: examined in normal and retroflexed positions and was

## 2025-03-31 NOTE — ANESTHESIA PRE PROCEDURE
Department of Anesthesiology  Preprocedure Note       Name:  Weston Harkins   Age:  64 y.o.  :  1960                                          MRN:  [id number]         Date:  3/31/2025      Surgeon: Surgeon(s):  Jonel Boggs IV, DO    Procedure: Procedure(s):  COLORECTAL CANCER SCREENING, NOT HIGH RISK    Medications prior to admission:   Prior to Admission medications    Medication Sig Start Date End Date Taking? Authorizing Provider   gabapentin (NEURONTIN) 100 MG capsule Take 1 capsule by mouth 2 times daily for 180 days. Intended supply: 30 days 3/27/25 9/23/25 Yes Heriberto Hernandez MD   polyethylene glycol (GLYCOLAX) 17 GM/SCOOP powder Take 17 g by mouth daily 3/19/25 4/18/25 Yes Meera Elizabeth DO   metoprolol (LOPRESSOR) 100 MG tablet Take 0.5 tablets by mouth 2 times daily TAKE ONE (1) TABLET BY MOUTH TWICE DAILY 24  Yes Karen Clark MD   simvastatin (ZOCOR) 40 MG tablet TAKE 1 TABLET BY MOUTH ONCE DAILY 24  Yes Karen Clark MD   metFORMIN (GLUCOPHAGE) 1000 MG tablet  11/15/24  Yes Yolanda Alonzo MD   furosemide (LASIX) 20 MG tablet Take two by mouth for the next two days and then as needed 24  Yes Fazal Davis MD   vitamin B-12 (CYANOCOBALAMIN) 1000 MCG tablet Take 1 tablet by mouth every morning 24  Yes Yolanda Alonzo MD   FEROSUL 325 (65 Fe) MG tablet Take 1 tablet by mouth daily (with breakfast) 24  Yes Yolanda Alonzo MD   dilTIAZem (CARDIZEM CD) 240 MG extended release capsule Take 1 capsule by mouth daily 24  Yes Yolanda Alonzo MD   amLODIPine (NORVASC) 10 MG tablet TAKE 1 TABLET BY MOUTH EVERY DAY 24  Yes Deondre Lopez MD   lisinopril (PRINIVIL;ZESTRIL) 40 MG tablet TAKE 1 TABLET BY MOUTH EVERY DAY 24  Yes Deondre Lopez MD   glipiZIDE (GLUCOTROL XL) 10 MG extended release tablet TAKE 1 TABLET BY MOUTH TWICE DAILY 24  Yes Deondre Lopez MD   hydrALAZINE (APRESOLINE) 10 MG tablet TAKE 1 TABLET BY MOUTH 3

## 2025-03-31 NOTE — ANESTHESIA POSTPROCEDURE EVALUATION
Department of Anesthesiology  Postprocedure Note    Patient: Weston Harkins  MRN: [id number]  YOB: 1960  Date of evaluation: 3/31/2025    Procedure Summary       Date: 03/31/25 Room / Location: [address]    Anesthesia Start: 1343 Anesthesia Stop: 1438    Procedure: COLONOSCOPY WITH TRANSVERSE COLON POLYPECTOMY BIOPSY Diagnosis:       Screen for colon cancer      (Screen for colon cancer [Z12.11])    Surgeons: Jonel Boggs IV, DO Responsible Provider: Elías Browning MD    Anesthesia Type: MAC ASA Status: 3            Anesthesia Type: No value filed.    Jhonatan Phase I:      Jhonatan Phase II: Jhonatan Score: 9    Anesthesia Post Evaluation    Patient location during evaluation: PACU  Patient participation: complete - patient participated  Level of consciousness: awake and awake and alert  Pain score: 0  Nausea & Vomiting: no nausea and no vomiting  Cardiovascular status: hemodynamically stable and blood pressure returned to baseline  Respiratory status: acceptable  Hydration status: euvolemic  Multimodal analgesia pain management approach  Pain management: adequate    No notable events documented.

## 2025-03-31 NOTE — DISCHARGE INSTRUCTIONS
Patient Discharge Instructions  Discharge Date:      HYGEINE: Ok to shower, no restrictions to regular daily hygiene.     DRIVING: No driving for 24 hours due to anethesia.    ACTIVITY: Activity as tolerated.     DIET: Resume your normal diet as advised by your PCP.    MEDICATIONS: Resume home medications as directed by your PCP.     SPECIAL INSTRUCTIONS:     Follow up with Dr. Boggs in 10-14 days, call the [facility] for appointment. Call sooner if any concerning signs or symptoms develop.

## 2025-04-01 RX ORDER — CYCLOBENZAPRINE HCL 10 MG
TABLET ORAL
Qty: 21 TABLET | Refills: 10 | OUTPATIENT
Start: 2025-04-01

## 2025-04-01 NOTE — TELEPHONE ENCOUNTER
This medication was refilled on 3-7-25 and 3-23-25 with 14 days of supply ( 22 total).           Please address the medication refill and close the encounter.  If I can be of assistance, please route to the applicable pool.      Thank you.      Last visit: 3-7-2025  Last Med refill: 3-  Does patient have enough medication for 72 hours: Yes    Next Visit Date:  Future Appointments   Date Time Provider Department Center   4/4/2025  2:00 PM Marcel Bender, PT [facility]   5/30/2025 12:40 PM Sunshine Duffy, APRN - CNP [facility] [place]       Health Maintenance   Topic Date Due    Shingles vaccine (1 of 2) Never done    Diabetic retinal exam  03/30/2016    Pneumococcal 50+ years Vaccine (2 of 2 - PCV) 06/16/2018    Diabetic foot exam  09/18/2018    Respiratory Syncytial Virus (RSV) Pregnant or age 60 yrs+ (1 - Risk 60-74 years 1-dose series) Never done    COVID-19 Vaccine (1 - 2024-25 season) Never done    Diabetic Alb to Cr ratio (uACR) test  04/11/2025    Lipids  04/11/2025    GFR test (Diabetes, CKD 3-4, OR last GFR 15-59)  04/11/2025    Flu vaccine (Season Ended) 03/07/2026 (Originally 8/1/2025)    A1C test (Diabetic or Prediabetic)  11/20/2025    Depression Screen  03/07/2026    Annual Wellness Visit (Medicare)  03/08/2026    DTaP/Tdap/Td vaccine (2 - Td or Tdap) 06/16/2027    Colorectal Cancer Screen  03/31/2035    Hepatitis C screen  Addressed    HIV screen  Addressed    Hepatitis A vaccine  Aged Out    Hepatitis B vaccine  Aged Out    Hib vaccine  Aged Out    Polio vaccine  Aged Out    Meningococcal (ACWY) vaccine  Aged Out    Meningococcal B vaccine  Aged Out    Pneumococcal 0-49 years Vaccine  Discontinued       Hemoglobin A1C (%)   Date Value   11/20/2024 5.8   04/11/2024 6.0   05/09/2023 7.1             ( goal A1C is < 7)   No components found for: \"LABMICR\"  No components found for: \"LDLCHOLESTEROL\", \"LDLCALC\"    (goal LDL is <100)   AST (U/L)   Date Value   08/26/2021 10

## 2025-04-03 LAB — SURGICAL PATHOLOGY REPORT: NORMAL

## 2025-04-04 ENCOUNTER — HOSPITAL ENCOUNTER (OUTPATIENT)
Age: 65
Setting detail: THERAPIES SERIES
Discharge: HOME | End: 2025-04-04
Attending: ANESTHESIOLOGY
Payer: MEDICARE

## 2025-04-04 PROCEDURE — 97110 THERAPEUTIC EXERCISES: CPT

## 2025-04-04 PROCEDURE — 97161 PT EVAL LOW COMPLEX 20 MIN: CPT

## 2025-04-04 NOTE — CARE COORDINATION
[] [facility]  Outpatient Rehabilitation &  Therapy  [address]  [phone]  [phone] [] [facility]  Outpatient Rehabilitation &  Therapy  [address]  P: [phone]  F: [phone] [] [facility]  Outpatient Rehabilitation &  Therapy  [address]  P: [phone]  F: [phone] [] [facility]  Outpatient Rehabilitation &  Therapy  [address]  [phone]  [phone] [] [facility]  Outpatient Rehabilitation &  Therapy  [address]   P: [phone]  F: [phone]  [] [facility]  Outpatient Rehabilitation &  Therapy  [address]  P: [phone]  F: [phone] [] [facility]  Outpatient Rehabilitation &  Therapy  [address]  P: [phone]  F: [phone] [] [facility]  Outpatient Rehabilitation &  Therapy  [address]  P: [phone]  F: [phone] [] [facility]  Outpatient Rehabilitation &  Therapy  [address]  P: [phone]  F: [phone]  [] [facility] Outpatient Rehabilitation &  Therapy  [address]  P: [phone]  F: [phone]     THERAPY RESPONSIBILITY OF CARE TRANSFER FORM       PATIENT NAME: Weston Harkins  MRN: [id number]   : 1960      TRANSFERRING FACILITY:    [] [place]   [] [facility]   [] [place]   [] [place] [facility]   [] [facility] [] [place]   [x] [facility]  [] [place] [facility]  [] [facility]   [] [place]  [] [place]   [] Other:          ACCEPTING FACILITY  [] [place]   [] [facility]   [] [place]   [] [facility]   [] [facility] [] [place]   [x] [facility]  [] [facility]  [] [facility]   [] [place]  [] [place]   [] Other:         REASON FOR TRANSFER: Pt was evaluated by a float

## 2025-04-04 NOTE — CONSULTS
[x] [facility]  Outpatient Rehabilitation &  Therapy  [address]  [phone]  [phone] [] [facility]  Outpatient Rehabilitation &  Therapy  [address]  P: [phone]  F: [phone] [] [facility]  Outpatient Rehabilitation &  Therapy  [address]  P: [phone]  F: [phone] [] [facility]  Outpatient Rehabilitation &  Therapy  [address]  [phone]  [phone] [] [facility]  Outpatient Rehabilitation &  Therapy  [address]   P: [phone]  F: [phone]  [] [facility]  Outpatient Rehabilitation &  Therapy  [address]  P: [phone]  F: [phone] [] [facility]  Outpatient Rehabilitation &  Therapy  [address]  P: [phone]  F: [phone] [] [facility]  Outpatient Rehabilitation &  Therapy  [address]  P: [phone]  F: [phone] [] [facility]  Outpatient Rehabilitation &  Therapy  [address]  P: [phone]  F: [phone]  [] [facility] Outpatient Rehabilitation &  Therapy  [address]  P: [phone]  F: [phone]   Physical Therapy Spine Evaluation    Date:  2025  Patient: Weston Harkins  : 1960  MRN: [id number]  Physician: Heriberto Hernandez MD    Insurance: [employer]VANTAGE Auth after eval  Medical Diagnosis:   M54.50, G89.29 (ICD-10-CM) - Chronic bilateral low back pain without sciatica   M54.2 (ICD-10-CM) - Neck pain   Rehab Codes: M54.5, M54.2, M25.511,  Onset Date: 3/27/2025 referral date  Next 's appt.: 2025    Subjective:   CC: Neck and back pain   HPI: Pt has been dealing with back and neck pain for a long time. Pt reports that the pain has been going on for the last 5 years. He reports some pain into his R

## 2025-04-08 ENCOUNTER — HOSPITAL ENCOUNTER (OUTPATIENT)
Age: 65
Setting detail: THERAPIES SERIES
Discharge: HOME | End: 2025-04-08
Attending: ANESTHESIOLOGY
Payer: MEDICARE

## 2025-04-08 PROCEDURE — 97110 THERAPEUTIC EXERCISES: CPT

## 2025-04-08 NOTE — FLOWSHEET NOTE
[x] [facility]  Outpatient Rehabilitation &  Therapy  [address]  [phone]  [phone]     Physical Therapy Daily Treatment Note    Date:  2025  Patient Name:  eWston Harkins      :  1960    MRN: [id number]  Physician: Heriberto Hernandez MD                                     Insurance: [employer]VANTAGE Auth after eval  Medical Diagnosis:   M54.50, G89.29 (ICD-10-CM) - Chronic bilateral low back pain without sciatica   M54.2 (ICD-10-CM) - Neck pain   Rehab Codes: M54.5, M54.2, M25.511,  Onset Date: 3/27/2025 referral date                        Next 's appt.: 2025    Visit# / total visits: 2/16    Cancels/No Shows: 0/0  Progress note for Medicare patient due at visit 10    Subjective:    Pain:  [x] Yes  [] No Location: neck/shoulder, low back   Pain Rating: (0-10 scale) 7/10 neck/shoulders; 9/10 low back  Pain altered Tx:  [x] No  [] Yes  Action:  Comments: patient reports increased pain in neck/shoulder areas coming in today. States that low back pain is always relevant/present throughout most days.     Objective:  Modalities:   Precautions [] No  [] Yes:   Exercises:  Exercise Reps/ Time Weight/ Level Comments   SciFit 5 min Level 2          Seated      Cervical ROM 10x\" ea  Rotation, side bending, flexion/extension   Shoulder Shrugs 15x           Supine      SKTC w/towel 5x10\" ea     Piriformis Stretch 3x20\" ea     LTR 2 min           Cervical Retraction 20x3     Cane Chest Press 20x 2 lb    Cane Flexion 20x 2 lb          Bridge 2x10     Alt March 2x10                       Other:         Treatment Charges: Mins Units   []  Modalities       [x]  Ther Exercise 39 3   []  Neuromuscular Re-ed     []  Gait Training     []  Manual Therapy     []  Ther Activities     []  Aquatics     []  Vasocompression     []  Cervical Traction     []  Other     Total Billable time 39 3          Assessment: [x] Progressing toward goals. Initiated Tx this date to

## 2025-04-09 NOTE — PRE-CERTIFICATION NOTE
[x] [facility]  Outpatient Rehabilitation &  Therapy  [address]  [phone]  [phone] [] [facility]  Outpatient Rehabilitation &  Therapy  [address]  P: [phone]  F: [phone] [] [facility]  Outpatient Rehabilitation &  Therapy  [address]  P: [phone]  F: [phone] [] [facility]  Outpatient Rehabilitation &  Therapy  [address]  [phone]  [phone] [] [facility]  Outpatient Rehabilitation &  Therapy  [address]   P: [phone]  F: [phone]  [] [facility]  Outpatient Rehabilitation &  Therapy  [address]  P: [phone]  F: [phone] [] [facility]  Outpatient Rehabilitation &  Therapy  [address]  P: [phone]  F: [phone] [] [facility]  Outpatient Rehabilitation &  Therapy  [address]  P: [phone]  F: [phone] [] [facility]  Outpatient Rehabilitation &  Therapy  [address]  P: [phone]  F: [phone]  [] [facility] Outpatient Rehabilitation &  Therapy  [address]  P: [phone]  F: [phone]          Therapy Pre-certification Note      4/9/2025    Weston Harkins  1960   [id number]      Insurance approval was received for Physical Therapy from [facility] on 4/9/2025.  Approval was received for 16 visits, from 4/4/2025 to 5/30/2025.  Authorization number [id number].    Patient is scheduled.      Electronically signed by Moni Talbert PT on 4/9/2025 at 8:15 AM

## 2025-04-10 ENCOUNTER — HOSPITAL ENCOUNTER (OUTPATIENT)
Age: 65
Setting detail: THERAPIES SERIES
Discharge: HOME | End: 2025-04-10
Attending: ANESTHESIOLOGY
Payer: MEDICARE

## 2025-04-10 NOTE — FLOWSHEET NOTE
[x] [facility]  Outpatient Rehabilitation &  Therapy  [address]  [phone]  [phone]     Therapy Cancel/No Show note    Date: 4/10/2025  Patient: Weston Harkins  : 1960  MRN: [id number]    Cancels/No Shows to date:     For today's appointment patient:    []  Cancelled    [] Rescheduled appointment    [x] No-show     Reason given by patient:    []  Patient ill    []  Conflicting appointment    [] No transportation      [] Conflict with work    [] No reason given    [] Weather related    [] COVID-19    [] Other:      Comments:        [x] Next appointment was confirmed previously    Electronically signed by: FLOR MARTINEZ PTA

## 2025-04-15 ENCOUNTER — HOSPITAL ENCOUNTER (OUTPATIENT)
Age: 65
Setting detail: THERAPIES SERIES
Discharge: HOME | End: 2025-04-15
Attending: ANESTHESIOLOGY
Payer: MEDICARE

## 2025-04-15 PROCEDURE — 97110 THERAPEUTIC EXERCISES: CPT

## 2025-04-15 NOTE — FLOWSHEET NOTE
[x] [facility]  Outpatient Rehabilitation &  Therapy  [address]  [phone]  [phone]     Physical Therapy Daily Treatment Note    Date:  4/15/2025  Patient Name:  Weston Harkins      :  1960    MRN: [id number]  Physician: Heriberto Hernandez MD                                     Insurance: [employer] MEDICARE ADVANTAGE 16 visits, from 2025 to 2025   Medical Diagnosis:   M54.50, G89.29 (ICD-10-CM) - Chronic bilateral low back pain without sciatica   M54.2 (ICD-10-CM) - Neck pain   Rehab Codes: M54.5, M54.2, M25.511,  Onset Date: 3/27/2025 referral date                        Next 's appt.: 2025    Visit# / total visits: 3/16    Cancels/No Shows: 0/0  Progress note for Medicare patient due at visit 10    Subjective:    Pain:  [x] Yes  [] No Location: neck/shoulder, low back   Pain Rating: (0-10 scale) 3/10 neck/shoulders; 3/10 low back  Pain altered Tx:  [x] No  [] Yes  Action:  Comments: Patient arrives more than 30 minutes late. Soreness noted after last session into the next day. States neck pain is worse at night, normally low back pain is constant. Pain today is pretty good.     Objective:  Modalities:   Precautions [x] No  [] Yes:   Exercises:  Exercise Reps/ Time Weight/ Level Comments   SciFit   Late held this date         Seated      Cervical ROM  10x\" ea  Rotation, side bending, flexion/extension   Shoulder Shrugs  15x     Scapular retraction 10x  added         Supine      SKTC w/towel 5x10\" ea     Piriformis Stretch  3x20\" ea     LTR    2 min           Cervical Retraction   20x3\"     Cane Chest Press   20x 2 lb    Cane Flexion    20x 2 lb          Bridge   2x10     Alt March 2x10                       Other:         Treatment Charges: Mins Units   []  Modalities     [x]  Ther Exercise 30 2   []  Neuromuscular Re-ed     []  Gait Training     []  Manual Therapy     []  Ther Activities     []  Aquatics     []  Vasocompression     []  Cervical

## 2025-04-17 ENCOUNTER — HOSPITAL ENCOUNTER (OUTPATIENT)
Age: 65
Setting detail: THERAPIES SERIES
Discharge: HOME | End: 2025-04-17
Attending: ANESTHESIOLOGY
Payer: MEDICARE

## 2025-04-17 NOTE — FLOWSHEET NOTE
[x] [facility]  Outpatient Rehabilitation &  Therapy  [address]  [phone]  [phone]     Therapy Cancel/No Show note    Date: 2025  Patient: Weston Harkins  : 1960  MRN: [id number]    Cancels/No Shows to date: 0    For today's appointment patient:    []  Cancelled    [] Rescheduled appointment    [x] No-show     Reason given by patient:    []  Patient ill    []  Conflicting appointment    [] No transportation      [] Conflict with work    [x] No reason given    [] Weather related    [] COVID-19    [x] Other:      Comments:  no further appt's scheduled at this time.        [] Next appointment was confirmed     Electronically signed by: Naif Hodgson PTA

## 2025-04-24 ENCOUNTER — HOSPITAL ENCOUNTER (OUTPATIENT)
Age: 65
Setting detail: THERAPIES SERIES
Discharge: HOME | End: 2025-04-24
Attending: ANESTHESIOLOGY
Payer: MEDICARE

## 2025-04-24 PROCEDURE — 97110 THERAPEUTIC EXERCISES: CPT

## 2025-04-24 NOTE — FLOWSHEET NOTE
35% impairment to demonstrate improved functional mobility []  []  []      2. Reduce pain levels to 3/10 or less with standing and walking to improve his function. []  []  []        []  []  []                        Patient goals: Decrease pain in neck and low back and improve his sleep       Pt. Education:  [x] Yes  [] No  [x] Reviewed Prior HEP/Ed  Method of Education: [x] Verbal  [x] Demo  [] Written  Comprehension of Education:  [x] Verbalizes understanding.  [] Demonstrates understanding.  [x] Needs review.  [] Demonstrates/verbalizes HEP/Ed previously given.   Access Code: [email or web address]  URL: [email or web address]  Date: 04/04/2025  Prepared by: Marcel Bender     Exercises  - Seated Cervical Retraction  - 2 x daily - 7 x weekly - 10 reps - 3 hold  - Seated Neck Sidebending Stretch  - 2 x daily - 7 x weekly - 5 sets - 5-10 hold  - Supine Lower Trunk Rotation  - 2 x daily - 7 x weekly - 10 reps  - Supine Piriformis Stretch with Foot on Ground  - 2 x daily - 7 x weekly - 3 sets - 30 hold  - Prone Press Up On Elbows  - 2 x daily - 7 x weekly - 2 min hold    Plan: [x] Continue current frequency toward long and short term goals.     [x] Frequency:  2 x/week for 16 visits   [x] Specific Instructions for subsequent treatments:           Time In: 1231 pm            Time Out: 105 pm      Electronically signed by:  Rylee Delcid PTA

## 2025-04-29 ENCOUNTER — HOSPITAL ENCOUNTER (OUTPATIENT)
Age: 65
Setting detail: THERAPIES SERIES
Discharge: HOME | End: 2025-04-29
Attending: ANESTHESIOLOGY
Payer: MEDICARE

## 2025-04-29 PROCEDURE — 97110 THERAPEUTIC EXERCISES: CPT

## 2025-04-29 NOTE — FLOWSHEET NOTE
2. Reduce pain levels to 3/10 or less with standing and walking to improve his function. []  []  []        []  []  []                        Patient goals: Decrease pain in neck and low back and improve his sleep       Pt. Education:  [x] Yes  [] No  [x] Reviewed Prior HEP/Ed  Method of Education: [x] Verbal  [x] Demo  [] Written  Comprehension of Education:  [x] Verbalizes understanding.  [] Demonstrates understanding.  [x] Needs review.  [] Demonstrates/verbalizes HEP/Ed previously given.   Access Code: [email or web address]  URL: [email or web address]  Date: 04/04/2025  Prepared by: Marcel Bender     Exercises  - Seated Cervical Retraction  - 2 x daily - 7 x weekly - 10 reps - 3 hold  - Seated Neck Sidebending Stretch  - 2 x daily - 7 x weekly - 5 sets - 5-10 hold  - Supine Lower Trunk Rotation  - 2 x daily - 7 x weekly - 10 reps  - Supine Piriformis Stretch with Foot on Ground  - 2 x daily - 7 x weekly - 3 sets - 30 hold  - Prone Press Up On Elbows  - 2 x daily - 7 x weekly - 2 min hold    Plan: [x] Continue current frequency toward long and short term goals.     [x] Frequency:  2 x/week for 16 visits   [x] Specific Instructions for subsequent treatments:           Time In: 1:15 pm            Time Out: 1:56 pm      Electronically signed by:  FLOR MARTINEZ PTA

## 2025-05-01 ENCOUNTER — HOSPITAL ENCOUNTER (OUTPATIENT)
Age: 65
Setting detail: THERAPIES SERIES
Discharge: HOME | End: 2025-05-01
Attending: ANESTHESIOLOGY
Payer: MEDICARE

## 2025-05-01 PROCEDURE — 97110 THERAPEUTIC EXERCISES: CPT

## 2025-05-01 NOTE — FLOWSHEET NOTE
[x] [facility]  Outpatient Rehabilitation &  Therapy  [address]  [phone]  [phone]     Physical Therapy Daily Treatment Note    Date:  2025  Patient Name:  Weston Harkins      :  1960    MRN: [id number]  Physician: Heriberto Hernandez MD                                     Insurance: [employer] MEDICARE ADVANTAGE 16 visits, from 2025 to 2025   Medical Diagnosis:   M54.50, G89.29 (ICD-10-CM) - Chronic bilateral low back pain without sciatica   M54.2 (ICD-10-CM) - Neck pain   Rehab Codes: M54.5, M54.2, M25.511,  Onset Date: 3/27/2025 referral date                        Next 's appt.: 2025    Visit# / total visits:     Cancels/No Shows: 0/2  Progress note for Medicare patient due at visit 10    Subjective:    Pain:  [x] Yes  [] No Location: neck/shoulder, low back   Pain Rating: (0-10 scale) --/10 neck/shoulders; 8/10 low back  Pain altered Tx:  [x] No  [] Yes  Action:  Comments: States the low back is feeling more loose today, rates pain at 6/10. Notes he isn't sure if \"that stuff she did last time worked or not\", feels like his neck and shoulders are more sore today than last time. Would like to hold off on hypervolt due to this.    Objective:  Modalities:   Precautions [x] No  [] Yes:   Exercises:  Exercise Reps/ Time Weight/ Level Comments   SciFit 5 m L2          Standing      Gastroc stretch 3x20\"  wedge   Heel raises  15x      Hip abduction 15x     Hip extension 15x     Palloff press 15x Blue    UE extension 15x blue    UE Rows 15x blue          Seated      Cervical ROM    10x\" ea  Rotation, side bending, flexion/extension   Shoulder Shrugs   15x     Scapular retraction      Bilateral ER  10x3\" Lime Added    HS stretch 3x15\"                 Supine      SKTC w/towel      Piriformis Stretch       LTR               Cervical Retraction        Cane Chest Press        Cane Flexion               Bridge        Alt March

## 2025-05-07 DIAGNOSIS — G89.29 CHRONIC BILATERAL LOW BACK PAIN WITHOUT SCIATICA: Chronic | ICD-10-CM

## 2025-05-07 DIAGNOSIS — M54.2 ACUTE NECK PAIN: ICD-10-CM

## 2025-05-07 DIAGNOSIS — M54.50 CHRONIC BILATERAL LOW BACK PAIN WITHOUT SCIATICA: Chronic | ICD-10-CM

## 2025-05-07 RX ORDER — LISINOPRIL 40 MG/1
TABLET ORAL
Qty: 30 TABLET | Refills: 10 | Status: SHIPPED | OUTPATIENT
Start: 2025-05-07

## 2025-05-07 RX ORDER — HYDRALAZINE HYDROCHLORIDE 10 MG/1
10 TABLET, FILM COATED ORAL 3 TIMES DAILY
Qty: 90 TABLET | Refills: 10 | Status: SHIPPED | OUTPATIENT
Start: 2025-05-07

## 2025-05-07 RX ORDER — CYCLOBENZAPRINE HCL 10 MG
10 TABLET ORAL 3 TIMES DAILY PRN
Qty: 21 TABLET | Refills: 0 | OUTPATIENT
Start: 2025-05-07 | End: 2025-05-17

## 2025-05-07 RX ORDER — GLIPIZIDE 10 MG/1
TABLET, FILM COATED, EXTENDED RELEASE ORAL
Qty: 60 TABLET | Refills: 10 | Status: SHIPPED | OUTPATIENT
Start: 2025-05-07

## 2025-05-07 NOTE — TELEPHONE ENCOUNTER
Last visit: 03/07/2025  Last Med refill:   Does patient have enough medication for 72 hours: No:     Next Visit Date:  Future Appointments   Date Time Provider Department Center   5/30/2025 12:40 PM Sunshine Duffy, APRN - CNP [facility]       Health Maintenance   Topic Date Due    Shingles vaccine (1 of 2) Never done    Diabetic retinal exam  03/30/2016    Pneumococcal 50+ years Vaccine (2 of 2 - PCV) 06/16/2018    Diabetic foot exam  09/18/2018    Respiratory Syncytial Virus (RSV) Pregnant or age 60 yrs+ (1 - Risk 60-74 years 1-dose series) Never done    COVID-19 Vaccine (1 - 2024-25 season) Never done    Diabetic Alb to Cr ratio (uACR) test  04/11/2025    Lipids  04/11/2025    GFR test (Diabetes, CKD 3-4, OR last GFR 15-59)  04/11/2025    Flu vaccine (Season Ended) 03/07/2026 (Originally 8/1/2025)    A1C test (Diabetic or Prediabetic)  11/20/2025    Depression Screen  03/07/2026    Annual Wellness Visit (Medicare)  03/08/2026    DTaP/Tdap/Td vaccine (2 - Td or Tdap) 06/16/2027    Colorectal Cancer Screen  03/31/2035    Hepatitis C screen  Addressed    HIV screen  Addressed    Hepatitis A vaccine  Aged Out    Hepatitis B vaccine  Aged Out    Hib vaccine  Aged Out    Polio vaccine  Aged Out    Meningococcal (ACWY) vaccine  Aged Out    Meningococcal B vaccine  Aged Out    Pneumococcal 0-49 years Vaccine  Discontinued       Hemoglobin A1C (%)   Date Value   11/20/2024 5.8   04/11/2024 6.0   05/09/2023 7.1             ( goal A1C is < 7)   No components found for: \"LABMICR\"  No components found for: \"LDLCHOLESTEROL\", \"LDLCALC\"    (goal LDL is <100)   AST (U/L)   Date Value   08/26/2021 10     ALT (U/L)   Date Value   08/26/2021 10     BUN (mg/dL)   Date Value   04/11/2024 29 (H)     BP Readings from Last 3 Encounters:   03/31/25 (!) 154/80   03/19/25 (!) 153/81   03/07/25 (!) 161/78          (goal 120/80)    All Future Testing planned in [facility]  Lab Frequency Next Occurrence   Baseline Diagnostic

## 2025-05-12 DIAGNOSIS — G89.29 CHRONIC BILATERAL LOW BACK PAIN WITHOUT SCIATICA: Chronic | ICD-10-CM

## 2025-05-12 DIAGNOSIS — M54.50 CHRONIC BILATERAL LOW BACK PAIN WITHOUT SCIATICA: Chronic | ICD-10-CM

## 2025-05-12 DIAGNOSIS — M54.2 ACUTE NECK PAIN: ICD-10-CM

## 2025-05-13 RX ORDER — CYCLOBENZAPRINE HCL 10 MG
TABLET ORAL
Qty: 21 TABLET | Refills: 11 | OUTPATIENT
Start: 2025-05-13

## 2025-06-02 ENCOUNTER — OFFICE VISIT (OUTPATIENT)
Age: 65
End: 2025-06-02
Payer: MEDICARE

## 2025-06-02 VITALS
SYSTOLIC BLOOD PRESSURE: 138 MMHG | WEIGHT: 171.2 LBS | DIASTOLIC BLOOD PRESSURE: 78 MMHG | OXYGEN SATURATION: 98 % | HEIGHT: 68 IN | BODY MASS INDEX: 25.94 KG/M2 | HEART RATE: 76 BPM

## 2025-06-02 DIAGNOSIS — G89.29 CHRONIC BILATERAL LOW BACK PAIN WITHOUT SCIATICA: ICD-10-CM

## 2025-06-02 DIAGNOSIS — M54.2 NECK PAIN: ICD-10-CM

## 2025-06-02 DIAGNOSIS — M54.50 CHRONIC BILATERAL LOW BACK PAIN WITHOUT SCIATICA: ICD-10-CM

## 2025-06-02 DIAGNOSIS — Z09 HOSPITAL DISCHARGE FOLLOW-UP: Primary | ICD-10-CM

## 2025-06-02 PROCEDURE — 99213 OFFICE O/P EST LOW 20 MIN: CPT

## 2025-06-02 PROCEDURE — 3078F DIAST BP <80 MM HG: CPT

## 2025-06-02 PROCEDURE — 3075F SYST BP GE 130 - 139MM HG: CPT

## 2025-06-02 PROCEDURE — 1111F DSCHRG MED/CURRENT MED MERGE: CPT

## 2025-06-02 RX ORDER — IBUPROFEN 400 MG/1
400 TABLET, FILM COATED ORAL
COMMUNITY
Start: 2025-06-01

## 2025-06-02 ASSESSMENT — ENCOUNTER SYMPTOMS
NAUSEA: 0
DIARRHEA: 0
WHEEZING: 0
VOMITING: 0
SHORTNESS OF BREATH: 0
CONSTIPATION: 0
ABDOMINAL PAIN: 0
BACK PAIN: 1

## 2025-06-02 ASSESSMENT — PATIENT HEALTH QUESTIONNAIRE - PHQ9
2. FEELING DOWN, DEPRESSED OR HOPELESS: NOT AT ALL
SUM OF ALL RESPONSES TO PHQ QUESTIONS 1-9: 0
SUM OF ALL RESPONSES TO PHQ QUESTIONS 1-9: 0
1. LITTLE INTEREST OR PLEASURE IN DOING THINGS: NOT AT ALL
SUM OF ALL RESPONSES TO PHQ QUESTIONS 1-9: 0
SUM OF ALL RESPONSES TO PHQ QUESTIONS 1-9: 0

## 2025-06-02 NOTE — PROGRESS NOTES
Post-Discharge Transitional Care  Follow Up      Weston Harkins   YOB: 1960    Date of Office Visit:  6/2/2025  Date of Hospital Admission: 3/31/25  Date of Hospital Discharge: 3/31/25  Risk of hospital readmission (high >=14%. Medium >=10%) :No data recorded    Care management risk score Rising risk (score 2-5) and Complex Care (Scores >=6): No Risk Score On File     Non face to face  following discharge, date last encounter closed (first attempt may have been earlier): *No documented post hospital discharge outreach found in the last 14 days    Call initiated 2 business days of discharge: *No response recorded in the last 14 days    ASSESSMENT/PLAN:   Hospital discharge follow-up  -     AL DISCHARGE MEDS RECONCILED W/ CURRENT OUTPATIENT MED LIST    Medical Decision Making: {TCMPN4:10644}  No follow-ups on file.    {Time Documentation Optional:[id number]}       Subjective:   HPI:  Follow up of Hospital problems/diagnosis(es): ***    Inpatient course: Discharge summary reviewed- see chart.    Interval history/Current status: ***    Patient Active Problem List   Diagnosis   • HLD (hyperlipidemia)   • Essential hypertension   • DM (diabetes mellitus) (HCC)   • Chronic back pain   • Thoracic aortic aneurysm diagnosed  2010? at [facility]   • Acute pain of right shoulder   • Other male erectile dysfunction   • Poorly controlled diabetes mellitus (HCC)   • Long term (current) use of antithrombotics/antiplatelets   • Chronic bilateral low back pain without sciatica   • Atheroscler of native artery of both legs with intermit claudication   • Sprain of collateral ligament of interphalangeal joint of great toe   • Diastasis recti   • Primary osteoarthritis of left hip   • Flatulence   • Ventral hernia without obstruction or gangrene   • Tachycardia   • Peripheral edema   • Aortic valve replaced   • Neck pain       Medications listed as ordered at the time of discharge from hospital     Medication List    
Attending Physician Statement  I  have discussed the care of Weston Harkins including pertinent history and exam findings with the resident. I agree with the assessment, plan and orders as documented by the resident.      /78 (BP Site: Left Upper Arm, Patient Position: Sitting, BP Cuff Size: Medium Adult)   Pulse 76   Ht 1.727 m (5' 8\")   Wt 77.7 kg (171 lb 3.2 oz)   SpO2 98%   BMI 26.03 kg/m²    BP Readings from Last 3 Encounters:   06/02/25 138/78   03/31/25 (!) 154/80   03/19/25 (!) 153/81     Wt Readings from Last 3 Encounters:   06/02/25 77.7 kg (171 lb 3.2 oz)   03/31/25 80.8 kg (178 lb 3.2 oz)   03/27/25 84.8 kg (187 lb)          Diagnosis Orders   1. Hospital discharge follow-up  MT DISCHARGE MEDS RECONCILED W/ CURRENT OUTPATIENT MED LIST      2. Chronic bilateral low back pain without sciatica  MRI LUMBAR SPINE WO CONTRAST      3. Neck pain                Oliver Donaldson DO 6/2/2025 4:31 PM      
Subjective:    Weston Harkins is a 65 y.o. male with  has a past medical history of Arthritis, CAD (coronary artery disease), Cerebral artery occlusion with cerebral infarction (HCC), Chronic back pain, Elbow pain, chronic, Full dentures, Fungus infection, Hyperlipidemia, Hypertension, Mitral valve regurgitation, Thoracic aortic aneurysm diagnosed  2010 at [facility], and Type II or unspecified type diabetes mellitus without mention of complication, not stated as uncontrolled.    Presented to the office today for:  Chief Complaint   Patient presents with    Follow-Up from Hospital     SOB- only when going up or down stairs.   Back pain        HPI  Patient presents today for posterior follow-up visit    Was at [facility] on 05/31/2025 for lower back pain  Denies any injuries or trauma  Does have a history of AAA repair approximately 10 years ago  Blood pressure was also elevated  Trops negative, lipase negative  CTA abdomen pelvis with contrast showed no acute aortic abnormality  CT angiogram chest showed prior ascending aortic graft that has some adjacent fluid/postoperative change, findings are grossly unchanged since prior study  Apparently clinical records disagree to whether thoracic or AAA surgery was performed    LBP   Chronic in nature, previously on R side but it started on L side   Currently slightly improved but persists   Last Lumbar xray 06/04/2024 - Anterior bridging osteophytes at the L5-S1 level with disc space narrowing   No radiation and no numbness or tingling  No weakness  Previously was on Motrin was dc'd  Did receive Motrin from the ER    Prescribed Gabapentin 100 mg bid   Was scheduled to see pain management 05/30/2025  Last appt with PM 03/27/25 - PT recommended and Nortriptyline and low-dose gabapentin   Underwent PT for cervical and LBP - did not help, only mild improvement     Review of Systems   Constitutional:  Negative for chills and fever.   Respiratory:  Negative for 
Out    Hib vaccine  Aged Out    Polio vaccine  Aged Out    Meningococcal (ACWY) vaccine  Aged Out    Meningococcal B vaccine  Aged Out    Pneumococcal 0-49 years Vaccine  Discontinued

## 2025-06-02 NOTE — PATIENT INSTRUCTIONS
Thank you for letting us take care of you today. We hope all your questions were addressed. If a question was overlooked or something else comes to mind after you return home, please contact a member of your Care Team listed below.      Your Care Team at [facility] is Team #  Addy Torrez M.D. (Faculty)  Savi Riddle M.D. (Resident)  Rohini Soria M.D. (Resident)   Della Goncalves M.D. (Resident)  Mathew Barber M.D. (Resident)  Elvie Craven M.D. (Resident)  Kaya Yan., [facility]  Barb Carrillo, [facility]  Ariana Blackwell, [facility]  Jose Manuel Hensley, [facility]  Kristy Sanchez, [facility]  Sonia Buckley, [facility]  Thalia Gabriel (LJ) [facility]AY[facility] Qureshi (Clinical Practice Manager)  Ekaterina Sutton [facility] (Clinical Pharmacist)     Office phone number: [phone]    If you need to get in right away due to illness, please be advised we have \"Same Day\" appointments available Monday-Friday. Please call us at [phone] option #3 to schedule your \"Same Day\" appointment.

## 2025-06-06 ENCOUNTER — HOSPITAL ENCOUNTER (OUTPATIENT)
Dept: PAIN MANAGEMENT | Age: 65
Discharge: HOME | End: 2025-06-06
Payer: MEDICARE

## 2025-06-06 VITALS — WEIGHT: 171 LBS | HEIGHT: 68 IN | BODY MASS INDEX: 25.91 KG/M2

## 2025-06-06 DIAGNOSIS — M54.50 CHRONIC BILATERAL LOW BACK PAIN WITHOUT SCIATICA: Chronic | ICD-10-CM

## 2025-06-06 DIAGNOSIS — M54.2 NECK PAIN: Primary | ICD-10-CM

## 2025-06-06 DIAGNOSIS — I10 ESSENTIAL HYPERTENSION: ICD-10-CM

## 2025-06-06 DIAGNOSIS — G89.29 CHRONIC BILATERAL LOW BACK PAIN WITHOUT SCIATICA: Chronic | ICD-10-CM

## 2025-06-06 DIAGNOSIS — E11.65 POORLY CONTROLLED DIABETES MELLITUS (HCC): ICD-10-CM

## 2025-06-06 PROCEDURE — 99213 OFFICE O/P EST LOW 20 MIN: CPT

## 2025-06-06 PROCEDURE — 99214 OFFICE O/P EST MOD 30 MIN: CPT | Performed by: NURSE PRACTITIONER

## 2025-06-06 RX ORDER — AMLODIPINE BESYLATE 10 MG/1
10 TABLET ORAL DAILY
Qty: 30 TABLET | Refills: 10 | Status: SHIPPED | OUTPATIENT
Start: 2025-06-06

## 2025-06-06 ASSESSMENT — ENCOUNTER SYMPTOMS
CONSTIPATION: 0
COUGH: 0
SHORTNESS OF BREATH: 0
BACK PAIN: 1

## 2025-06-06 NOTE — PROGRESS NOTES
Chief Complaint   Patient presents with    Neck Pain         [facility]   This is 64-year-old gentleman with history of neck and back pain     Chronic neck pain located over the axial cervical spine right side more than left extends over the trapezius to the shoulder  No particular injury associated with this onset of pain, started on its own when he woke up 1 morning  Aggravates with neck movement describes it as deep aching stiffness  No dermatomal radiation of pain  No associated dermatomal numbness or paresthesia  No changes in bladder or bowel control  No previous physical therapy  No previous MRI  No previous cervical spine interventional procedures surgical history     Back pain  Chronic going on for 5+ years located in the lower lumbosacral area across midline on both sides  Describes the pain as constant aggravated with routine activity interfering with quality of life, symptoms are progressively worsening  Last physical therapy more than 2 years ago  Had previous CT scan and x-rays that showed lower lumbar and lumbar spine advanced degenerative disc changes  Lumbar MRI ordered but not completed  No previous lumbar spine interventional procedure or surgical history  Have tried NSAIDs and muscle relaxant in past without much benefit      He was last seem in the office 3/2025 and referred to physical therapy. He completed 6 visits and continues to have pain. Was also prescribed nortriptyline and gabapentin. He states he has not tried these medications - \"I am already on a lot of medications so I did not want to take them\".      Neck Pain   This is a chronic problem. The current episode started more than 1 month ago. The problem occurs constantly. The problem has been unchanged. The pain is associated with an unknown factor. The pain is present in the right side. The quality of the pain is described as aching. The pain is at a severity of 7/10. The pain is moderate. Exacerbated by: sitting in certain

## 2025-06-06 NOTE — TELEPHONE ENCOUNTER
Please address the medication refill and close the encounter.  If I can be of assistance, please route to the applicable pool.      Thank you.      Last visit: 6-2-2025  Last Med refill: 6-  Does patient have enough medication for 72 hours: No:     Next Visit Date:  Future Appointments   Date Time Provider Department Center   6/6/2025  1:40 PM Sunshine Duffy, APRN - CNP [facility]   Topic Date Due    Shingles vaccine (1 of 2) Never done    Diabetic retinal exam  03/30/2016    Pneumococcal 50+ years Vaccine (2 of 2 - PCV) 06/16/2018    Diabetic foot exam  09/18/2018    Respiratory Syncytial Virus (RSV) Pregnant or age 60 yrs+ (1 - Risk 60-74 years 1-dose series) Never done    COVID-19 Vaccine (1 - 2024-25 season) Never done    Diabetic Alb to Cr ratio (uACR) test  04/11/2025    Lipids  04/11/2025    GFR test (Diabetes, CKD 3-4, OR last GFR 15-59)  04/11/2025    Flu vaccine (Season Ended) 03/07/2026 (Originally 8/1/2025)    A1C test (Diabetic or Prediabetic)  11/20/2025    Annual Wellness Visit (Medicare)  03/08/2026    Depression Screen  06/02/2026    DTaP/Tdap/Td vaccine (2 - Td or Tdap) 06/16/2027    Colorectal Cancer Screen  03/31/2035    Hepatitis C screen  Addressed    HIV screen  Addressed    Hepatitis A vaccine  Aged Out    Hepatitis B vaccine  Aged Out    Hib vaccine  Aged Out    Polio vaccine  Aged Out    Meningococcal (ACWY) vaccine  Aged Out    Meningococcal B vaccine  Aged Out    Pneumococcal 0-49 years Vaccine  Discontinued       Hemoglobin A1C (%)   Date Value   11/20/2024 5.8   04/11/2024 6.0   05/09/2023 7.1             ( goal A1C is < 7)   No components found for: \"LABMICR\"  No components found for: \"LDLCHOLESTEROL\", \"LDLCALC\"    (goal LDL is <100)   AST (U/L)   Date Value   08/26/2021 10     ALT (U/L)   Date Value   08/26/2021 10     BUN (mg/dL)   Date Value   04/11/2024 29 (H)     BP Readings from Last 3 Encounters:   06/02/25 138/78   03/31/25

## 2025-06-16 NOTE — TELEPHONE ENCOUNTER
Please address the medication refill and close the encounter.  If I can be of assistance, please route to the applicable pool.      Thank you.      Last visit: 6-2-25  Last Med refill: 11-  Does patient have enough medication for 72 hours: No:     Next Visit Date:  Future Appointments   Date Time Provider Department Center   6/25/2025  1:00 PM ST[facility] MRI RM 1 (1.5T) [facility] MRI [facility]   6/25/2025  4:30 PM ST[facility] MRI RM 1 (1.5T) [facility] MRI [facility] Radiolog   7/2/2025 10:40 AM Sunshine Duffy, APRN - CNP [facility]   Topic Date Due    Shingles vaccine (1 of 2) Never done    Diabetic retinal exam  03/30/2016    Pneumococcal 50+ years Vaccine (2 of 2 - PCV) 06/16/2018    Diabetic foot exam  09/18/2018    Respiratory Syncytial Virus (RSV) Pregnant or age 60 yrs+ (1 - Risk 60-74 years 1-dose series) Never done    COVID-19 Vaccine (1 - 2024-25 season) Never done    Diabetic Alb to Cr ratio (uACR) test  04/11/2025    Lipids  04/11/2025    GFR test (Diabetes, CKD 3-4, OR last GFR 15-59)  04/11/2025    Flu vaccine (Season Ended) 03/07/2026 (Originally 8/1/2025)    A1C test (Diabetic or Prediabetic)  11/20/2025    Annual Wellness Visit (Medicare)  03/08/2026    Depression Screen  06/02/2026    DTaP/Tdap/Td vaccine (2 - Td or Tdap) 06/16/2027    Colorectal Cancer Screen  03/31/2035    Hepatitis C screen  Addressed    HIV screen  Addressed    Hepatitis A vaccine  Aged Out    Hepatitis B vaccine  Aged Out    Hib vaccine  Aged Out    Polio vaccine  Aged Out    Meningococcal (ACWY) vaccine  Aged Out    Meningococcal B vaccine  Aged Out    Pneumococcal 0-49 years Vaccine  Discontinued       Hemoglobin A1C (%)   Date Value   11/20/2024 5.8   04/11/2024 6.0   05/09/2023 7.1             ( goal A1C is < 7)   No components found for: \"LABMICR\"  No components found for: \"LDLCHOLESTEROL\", \"LDLCALC\"    (goal LDL is <100)   AST (U/L)   Date Value   08/26/2021 10     ALT (U/L)   Date Value

## 2025-08-18 ENCOUNTER — CARE COORDINATION (OUTPATIENT)
Dept: CARE COORDINATION | Age: 65
End: 2025-08-18

## 2025-08-19 ENCOUNTER — OFFICE VISIT (OUTPATIENT)
Age: 65
End: 2025-08-19
Payer: MEDICARE

## 2025-08-19 VITALS
WEIGHT: 182.2 LBS | DIASTOLIC BLOOD PRESSURE: 78 MMHG | HEART RATE: 65 BPM | BODY MASS INDEX: 27.61 KG/M2 | HEIGHT: 68 IN | SYSTOLIC BLOOD PRESSURE: 139 MMHG

## 2025-08-19 DIAGNOSIS — E11.9 TYPE 2 DIABETES MELLITUS WITHOUT COMPLICATION, WITHOUT LONG-TERM CURRENT USE OF INSULIN (HCC): ICD-10-CM

## 2025-08-19 DIAGNOSIS — E78.5 HYPERLIPIDEMIA, UNSPECIFIED HYPERLIPIDEMIA TYPE: ICD-10-CM

## 2025-08-19 DIAGNOSIS — M54.2 CERVICALGIA: Primary | ICD-10-CM

## 2025-08-19 PROCEDURE — 3075F SYST BP GE 130 - 139MM HG: CPT

## 2025-08-19 PROCEDURE — 99213 OFFICE O/P EST LOW 20 MIN: CPT

## 2025-08-19 PROCEDURE — 3078F DIAST BP <80 MM HG: CPT

## 2025-08-19 RX ORDER — LIDOCAINE 50 MG/G
OINTMENT TOPICAL
Qty: 50 G | Refills: 1 | Status: SHIPPED | OUTPATIENT
Start: 2025-08-19

## 2025-08-19 RX ORDER — NAPROXEN 500 MG/1
500 TABLET ORAL 2 TIMES DAILY PRN
Qty: 60 TABLET | Refills: 0 | Status: SHIPPED | OUTPATIENT
Start: 2025-08-19

## 2025-08-19 ASSESSMENT — ENCOUNTER SYMPTOMS
SHORTNESS OF BREATH: 0
BACK PAIN: 1
COUGH: 0
CHEST TIGHTNESS: 0

## (undated) DEVICE — GOWN,AURORA,NONRNF,XL,30/CS: Brand: MEDLINE

## (undated) DEVICE — CANNULA ARTHSCP L7CM ID5.75MM CRYS W/ OBT

## (undated) DEVICE — SOLUTION IRRIG 1000ML STRL H2O USP PLAS POUR BTL

## (undated) DEVICE — Device: Brand: DEFENDO VALVE AND CONNECTOR KIT

## (undated) DEVICE — TUBING, SUCTION, 9/32" X 20', STRAIGHT: Brand: MEDLINE INDUSTRIES, INC.

## (undated) DEVICE — Device

## (undated) DEVICE — CONNECTOR TBNG AUX H2O JET DISP FOR OLY 160/180 SER

## (undated) DEVICE — CONTAINER,SPECIMEN,4OZ,OR STRL: Brand: MEDLINE

## (undated) DEVICE — GARMENT COMPR STD FOR 17IN CALF UNIF THER FLOTRN

## (undated) DEVICE — ELECTRODE PT RET AD L9FT HI MOIST COND ADH HYDRGEL CORDED

## (undated) DEVICE — PROTECTOR ULN NRV PUR FOAM HK LOOP STRP ANATOMICALLY

## (undated) DEVICE — DRAPE,REIN 53X77,STERILE: Brand: MEDLINE

## (undated) DEVICE — SLING ARM L L17 1/2IN D8.5IN COT POLY DLX W/ SHLDR PD

## (undated) DEVICE — 1016 S-DRAPE IRRIG POUCH 10/BOX: Brand: STERI-DRAPE™

## (undated) DEVICE — GLOVE ORANGE PI 7   MSG9070

## (undated) DEVICE — POLYP TRAP: Brand: TRAPEASE®

## (undated) DEVICE — ERBE NESSY®PLATE 170 SPLIT; 168CM²; CABLE 3M: Brand: ERBE

## (undated) DEVICE — ZIMMER® STERILE DISPOSABLE TOURNIQUET CUFF WITH PROTECTIVE SLEEVE AND PLC, DUAL PORT, SINGLE BLADDER, 18 IN. (46 CM)

## (undated) DEVICE — GLOVE ORANGE PI 7 1/2   MSG9075

## (undated) DEVICE — BNDG,ELSTC,MATRIX,STRL,4"X5YD,LF,HOOK&LP: Brand: MEDLINE

## (undated) DEVICE — COVER LT HNDL BLU PLAS

## (undated) DEVICE — PERRYSBURG ENDO PACK: Brand: MEDLINE INDUSTRIES, INC.

## (undated) DEVICE — ADAPTER CLEANING PORPOISE CLEANING

## (undated) DEVICE — STERLING XTRASHARP SHAVER GREAT WHITE SHAVER BLADE, 4.2 MM: Brand: STERLING XTRASHARP SHAVER GREAT WHITE

## (undated) DEVICE — SNARE ENDOSCP L240CM LOOP W13MM SHTH DIA2.4MM SM OVL FLX

## (undated) DEVICE — BANDAGE,ELASTIC,ESMARK,STERILE,4"X9',LF: Brand: MEDLINE

## (undated) DEVICE — SUTURE NONABSORBABLE MONOFILAMENT 3-0 PS-1 18 IN BLK ETHILON 1663H

## (undated) DEVICE — DRAPE,U/ SHT,SPLIT,PLAS,STERIL: Brand: MEDLINE

## (undated) DEVICE — CONNECTOR,TUBING,5-IN-1,NON-STERILE: Brand: MEDLINE INDUSTRIES, INC.

## (undated) DEVICE — CHLORAPREP 26ML ORANGE